# Patient Record
Sex: MALE | Race: WHITE | NOT HISPANIC OR LATINO | Employment: OTHER | URBAN - METROPOLITAN AREA
[De-identification: names, ages, dates, MRNs, and addresses within clinical notes are randomized per-mention and may not be internally consistent; named-entity substitution may affect disease eponyms.]

---

## 2018-03-09 ENCOUNTER — OFFICE VISIT CONVERTED (OUTPATIENT)
Dept: PULMONOLOGY | Facility: CLINIC | Age: 83
End: 2018-03-09
Attending: PHYSICIAN ASSISTANT

## 2018-06-21 ENCOUNTER — OFFICE VISIT CONVERTED (OUTPATIENT)
Dept: PULMONOLOGY | Facility: CLINIC | Age: 83
End: 2018-06-21
Attending: INTERNAL MEDICINE

## 2018-11-08 ENCOUNTER — OFFICE VISIT CONVERTED (OUTPATIENT)
Dept: PULMONOLOGY | Facility: CLINIC | Age: 83
End: 2018-11-08
Attending: INTERNAL MEDICINE

## 2019-03-26 ENCOUNTER — OFFICE VISIT CONVERTED (OUTPATIENT)
Dept: PULMONOLOGY | Facility: CLINIC | Age: 84
End: 2019-03-26
Attending: INTERNAL MEDICINE

## 2019-05-08 ENCOUNTER — OFFICE VISIT CONVERTED (OUTPATIENT)
Dept: UROLOGY | Facility: CLINIC | Age: 84
End: 2019-05-08
Attending: UROLOGY

## 2019-05-08 ENCOUNTER — CONVERSION ENCOUNTER (OUTPATIENT)
Dept: SURGERY | Facility: CLINIC | Age: 84
End: 2019-05-08

## 2019-06-28 ENCOUNTER — HOSPITAL ENCOUNTER (OUTPATIENT)
Dept: CARDIOLOGY | Facility: HOSPITAL | Age: 84
Discharge: HOME | End: 2019-06-28
Attending: INTERNAL MEDICINE

## 2019-07-01 ENCOUNTER — OFFICE VISIT CONVERTED (OUTPATIENT)
Dept: PULMONOLOGY | Facility: CLINIC | Age: 84
End: 2019-07-01
Attending: PHYSICIAN ASSISTANT

## 2019-07-18 ENCOUNTER — OFFICE VISIT CONVERTED (OUTPATIENT)
Dept: PULMONOLOGY | Facility: CLINIC | Age: 84
End: 2019-07-18
Attending: PHYSICIAN ASSISTANT

## 2019-11-08 ENCOUNTER — OFFICE VISIT CONVERTED (OUTPATIENT)
Dept: UROLOGY | Facility: CLINIC | Age: 84
End: 2019-11-08
Attending: UROLOGY

## 2019-12-09 ENCOUNTER — OFFICE VISIT CONVERTED (OUTPATIENT)
Dept: PULMONOLOGY | Facility: CLINIC | Age: 84
End: 2019-12-09
Attending: INTERNAL MEDICINE

## 2020-06-16 ENCOUNTER — HOSPITAL ENCOUNTER (OUTPATIENT)
Dept: GENERAL RADIOLOGY | Facility: HOSPITAL | Age: 85
Discharge: HOME | End: 2020-06-16
Attending: INTERNAL MEDICINE

## 2020-06-16 ENCOUNTER — OFFICE VISIT CONVERTED (OUTPATIENT)
Dept: PULMONOLOGY | Facility: CLINIC | Age: 85
End: 2020-06-16
Attending: INTERNAL MEDICINE

## 2020-06-16 ENCOUNTER — HOSPITAL ENCOUNTER (OUTPATIENT)
Dept: LAB | Facility: HOSPITAL | Age: 85
Discharge: HOME | End: 2020-06-16
Attending: INTERNAL MEDICINE

## 2020-06-16 LAB
ALBUMIN SERPL-MCNC: 4 G/DL (ref 3.5–5)
ALBUMIN/GLOB SERPL: 1.3 {RATIO} (ref 1.4–2.6)
ALP SERPL-CCNC: 90 U/L (ref 56–155)
ALT SERPL-CCNC: 23 U/L (ref 10–40)
ANION GAP SERPL CALC-SCNC: 12 MMOL/L (ref 8–19)
AST SERPL-CCNC: 27 U/L (ref 15–50)
BASOPHILS # BLD AUTO: 0.05 10*3/UL (ref 0–0.2)
BASOPHILS NFR BLD AUTO: 0.6 % (ref 0–3)
BILIRUB SERPL-MCNC: 0.65 MG/DL (ref 0.2–1.3)
BNP SERPL-MCNC: 3560 PG/ML (ref 0–1800)
BUN SERPL-MCNC: 19 MG/DL (ref 5–25)
BUN/CREAT SERPL: 17 {RATIO} (ref 6–20)
CALCIUM SERPL-MCNC: 9.2 MG/DL (ref 8.7–10.4)
CHLORIDE SERPL-SCNC: 103 MMOL/L (ref 99–111)
CONV ABS IMM GRAN: 0.02 10*3/UL (ref 0–0.2)
CONV CO2: 29 MMOL/L (ref 22–32)
CONV IMMATURE GRAN: 0.2 % (ref 0–1.8)
CONV TOTAL PROTEIN: 7 G/DL (ref 6.3–8.2)
CREAT UR-MCNC: 1.12 MG/DL (ref 0.7–1.2)
DEPRECATED RDW RBC AUTO: 45.4 FL (ref 35.1–43.9)
EOSINOPHIL # BLD AUTO: 0.33 10*3/UL (ref 0–0.7)
EOSINOPHIL # BLD AUTO: 3.9 % (ref 0–7)
ERYTHROCYTE [DISTWIDTH] IN BLOOD BY AUTOMATED COUNT: 14.7 % (ref 11.6–14.4)
GFR SERPLBLD BASED ON 1.73 SQ M-ARVRAT: 58 ML/MIN/{1.73_M2}
GLOBULIN UR ELPH-MCNC: 3 G/DL (ref 2–3.5)
GLUCOSE SERPL-MCNC: 96 MG/DL (ref 70–99)
HCT VFR BLD AUTO: 44.5 % (ref 42–52)
HGB BLD-MCNC: 14.1 G/DL (ref 14–18)
LYMPHOCYTES # BLD AUTO: 2.35 10*3/UL (ref 1–5)
LYMPHOCYTES NFR BLD AUTO: 28.1 % (ref 20–45)
MCH RBC QN AUTO: 27 PG (ref 27–31)
MCHC RBC AUTO-ENTMCNC: 31.7 G/DL (ref 33–37)
MCV RBC AUTO: 85.2 FL (ref 80–96)
MONOCYTES # BLD AUTO: 0.8 10*3/UL (ref 0.2–1.2)
MONOCYTES NFR BLD AUTO: 9.6 % (ref 3–10)
NEUTROPHILS # BLD AUTO: 4.81 10*3/UL (ref 2–8)
NEUTROPHILS NFR BLD AUTO: 57.6 % (ref 30–85)
NRBC CBCN: 0 % (ref 0–0.7)
OSMOLALITY SERPL CALC.SUM OF ELEC: 294 MOSM/KG (ref 273–304)
PLATELET # BLD AUTO: 172 10*3/UL (ref 130–400)
PMV BLD AUTO: 12.5 FL (ref 9.4–12.4)
POTASSIUM SERPL-SCNC: 3.4 MMOL/L (ref 3.5–5.3)
RBC # BLD AUTO: 5.22 10*6/UL (ref 4.7–6.1)
SODIUM SERPL-SCNC: 141 MMOL/L (ref 135–147)
WBC # BLD AUTO: 8.36 10*3/UL (ref 4.8–10.8)

## 2020-06-24 ENCOUNTER — OFFICE VISIT CONVERTED (OUTPATIENT)
Dept: PULMONOLOGY | Facility: CLINIC | Age: 85
End: 2020-06-24
Attending: INTERNAL MEDICINE

## 2020-08-19 ENCOUNTER — OFFICE VISIT CONVERTED (OUTPATIENT)
Dept: PULMONOLOGY | Facility: CLINIC | Age: 85
End: 2020-08-19
Attending: INTERNAL MEDICINE

## 2020-08-19 ENCOUNTER — HOSPITAL ENCOUNTER (OUTPATIENT)
Dept: PREADMISSION TESTING | Facility: HOSPITAL | Age: 85
Discharge: HOME | End: 2020-08-19
Attending: INTERNAL MEDICINE

## 2020-08-20 LAB — SARS-COV-2 RNA SPEC QL NAA+PROBE: NOT DETECTED

## 2020-08-24 ENCOUNTER — HOSPITAL ENCOUNTER (OUTPATIENT)
Dept: GASTROENTEROLOGY | Facility: HOSPITAL | Age: 85
Setting detail: HOSPITAL OUTPATIENT SURGERY
Discharge: HOME | End: 2020-08-24
Attending: INTERNAL MEDICINE

## 2020-08-24 LAB
EPI CELLS NFR FLD: 14 %
LYMPHOCYTES NFR FLD MANUAL: 12 %
MACROPHAGE FLUID: 14 /100{WBCS}
NEUTROPHILS NFR FLD MANUAL: 60 %
VISUAL PRESENCE OF BLOOD: NORMAL

## 2020-08-27 LAB
AMPICILLIN SUSC ISLT: >=32
AMPICILLIN+SULBAC SUSC ISLT: 4
BACTERIA SPEC AEROBE CULT: ABNORMAL
CEFAZOLIN SUSC ISLT: <=4
CEFEPIME SUSC ISLT: 2
CEFEPIME SUSC ISLT: <=0.12
CEFTAZIDIME SUSC ISLT: 4
CEFTAZIDIME SUSC ISLT: <=1
CEFTRIAXONE SUSC ISLT: <=0.25
CIPROFLOXACIN SUSC ISLT: <=0.25
CIPROFLOXACIN SUSC ISLT: <=0.25
CIPROFLOXACIN SUSC ISLT: >=8
CLINDAMYCIN SUSC ISLT: 0.25
CONV BRONCHIAL WASH CULTURE: ABNORMAL
CONV NOCARDIA STAIN (PARTIAL,MODIFIED ACID FAST): NORMAL
DOXYCYCLINE SUSC ISLT: <=0.5
ERTAPENEM SUSC ISLT: <=0.12
ERYTHROMYCIN SUSC ISLT: >=8
GENTAMICIN SUSC ISLT: <=0.5
GENTAMICIN SUSC ISLT: <=1
GENTAMICIN SUSC ISLT: <=1
LEVOFLOXACIN SUSC ISLT: 1
LEVOFLOXACIN SUSC ISLT: <=0.12
LEVOFLOXACIN SUSC ISLT: >=8
OXACILLIN SUSC ISLT: >=4
PIP+TAZO SUSC ISLT: 8
PIP+TAZO SUSC ISLT: <=4
RIFAMPIN SUSC ISLT: <=0.5
TETRACYCLINE SUSC ISLT: <=1
TIGECYCLINE SUSC ISLT: <=0.12
TMP SMX SUSC ISLT: <=10
TMP SMX SUSC ISLT: <=20
TOBRAMYCIN SUSC ISLT: <=1
TOBRAMYCIN SUSC ISLT: <=1
VANCOMYCIN SUSC ISLT: 1

## 2020-08-28 LAB
CMV DNA SPEC QL NAA+PROBE: DETECTED
CONV ADENOVIRUS  (BAL OR WASH): NEGATIVE
CONV CYTOMEGALOVIRUS VIRUS SOURCE: ABNORMAL
CONV HERPES SIMPLEX VIRUS QUAL. PCR: NOT DETECTED
FLUAV RNA SPEC QL NAA+PROBE: NEGATIVE
FLUBV RNA ISLT QL NAA+PROBE: NEGATIVE
HERPES SIMPLEX VIRUS SOURCE: NORMAL
HMPV RNA SPEC QL NAA+PROBE: NEGATIVE
HPIV1 RNA ISLT QL NAA+PROBE: NEGATIVE
HPIV2 SPEC QL CULT: NEGATIVE
HPIV3 SPEC QL CULT: NEGATIVE
RHINOVIRUS RNA SPEC QL NAA+PROBE: NEGATIVE
RSV A: NEGATIVE
RSV B RNA SPEC QL NAA+PROBE: NEGATIVE

## 2020-09-01 ENCOUNTER — OFFICE VISIT CONVERTED (OUTPATIENT)
Dept: PULMONOLOGY | Facility: CLINIC | Age: 85
End: 2020-09-01
Attending: PHYSICIAN ASSISTANT

## 2020-09-03 LAB — CONV LEGIONELLA CULTURE: NORMAL

## 2020-09-15 ENCOUNTER — CONVERSION ENCOUNTER (OUTPATIENT)
Dept: PULMONOLOGY | Facility: CLINIC | Age: 85
End: 2020-09-15

## 2020-11-02 ENCOUNTER — HOSPITAL ENCOUNTER (OUTPATIENT)
Dept: GENERAL RADIOLOGY | Facility: HOSPITAL | Age: 85
Discharge: HOME | End: 2020-11-02
Attending: PHYSICIAN ASSISTANT

## 2020-11-05 ENCOUNTER — HOSPITAL ENCOUNTER (OUTPATIENT)
Dept: PET IMAGING | Facility: HOSPITAL | Age: 85
Discharge: HOME | End: 2020-11-05
Attending: NURSE PRACTITIONER

## 2020-11-09 ENCOUNTER — OFFICE VISIT CONVERTED (OUTPATIENT)
Dept: PULMONOLOGY | Facility: CLINIC | Age: 85
End: 2020-11-09
Attending: NURSE PRACTITIONER

## 2020-11-09 ENCOUNTER — HOSPITAL ENCOUNTER (OUTPATIENT)
Dept: LAB | Facility: HOSPITAL | Age: 85
Discharge: HOME | End: 2020-11-09
Attending: NURSE PRACTITIONER

## 2020-11-09 LAB
ALBUMIN SERPL-MCNC: 3.8 G/DL (ref 3.5–5)
ALBUMIN/GLOB SERPL: 1.5 {RATIO} (ref 1.4–2.6)
ALP SERPL-CCNC: 71 U/L (ref 56–155)
ALT SERPL-CCNC: 17 U/L (ref 10–40)
ANION GAP SERPL CALC-SCNC: 12 MMOL/L (ref 8–19)
AST SERPL-CCNC: 30 U/L (ref 15–50)
BASOPHILS # BLD AUTO: 0.06 10*3/UL (ref 0–0.2)
BASOPHILS NFR BLD AUTO: 0.6 % (ref 0–3)
BILIRUB SERPL-MCNC: 0.46 MG/DL (ref 0.2–1.3)
BUN SERPL-MCNC: 24 MG/DL (ref 5–25)
BUN/CREAT SERPL: 20 {RATIO} (ref 6–20)
CALCIUM SERPL-MCNC: 9.3 MG/DL (ref 8.7–10.4)
CHLORIDE SERPL-SCNC: 101 MMOL/L (ref 99–111)
CONV ABS IMM GRAN: 0.03 10*3/UL (ref 0–0.2)
CONV CO2: 29 MMOL/L (ref 22–32)
CONV IMMATURE GRAN: 0.3 % (ref 0–1.8)
CONV TOTAL PROTEIN: 6.4 G/DL (ref 6.3–8.2)
CREAT UR-MCNC: 1.21 MG/DL (ref 0.7–1.2)
DEPRECATED RDW RBC AUTO: 50.1 FL (ref 35.1–43.9)
EOSINOPHIL # BLD AUTO: 0.21 10*3/UL (ref 0–0.7)
EOSINOPHIL # BLD AUTO: 2.2 % (ref 0–7)
ERYTHROCYTE [DISTWIDTH] IN BLOOD BY AUTOMATED COUNT: 15.5 % (ref 11.6–14.4)
GFR SERPLBLD BASED ON 1.73 SQ M-ARVRAT: 53 ML/MIN/{1.73_M2}
GLOBULIN UR ELPH-MCNC: 2.6 G/DL (ref 2–3.5)
GLUCOSE SERPL-MCNC: 82 MG/DL (ref 70–99)
HCT VFR BLD AUTO: 44 % (ref 42–52)
HGB BLD-MCNC: 14.2 G/DL (ref 14–18)
LYMPHOCYTES # BLD AUTO: 2.48 10*3/UL (ref 1–5)
LYMPHOCYTES NFR BLD AUTO: 26.4 % (ref 20–45)
MCH RBC QN AUTO: 28.4 PG (ref 27–31)
MCHC RBC AUTO-ENTMCNC: 32.3 G/DL (ref 33–37)
MCV RBC AUTO: 88 FL (ref 80–96)
MONOCYTES # BLD AUTO: 0.84 10*3/UL (ref 0.2–1.2)
MONOCYTES NFR BLD AUTO: 8.9 % (ref 3–10)
NEUTROPHILS # BLD AUTO: 5.78 10*3/UL (ref 2–8)
NEUTROPHILS NFR BLD AUTO: 61.6 % (ref 30–85)
NRBC CBCN: 0 % (ref 0–0.7)
OSMOLALITY SERPL CALC.SUM OF ELEC: 289 MOSM/KG (ref 273–304)
PLATELET # BLD AUTO: 182 10*3/UL (ref 130–400)
PMV BLD AUTO: 12.2 FL (ref 9.4–12.4)
POTASSIUM SERPL-SCNC: 4.3 MMOL/L (ref 3.5–5.3)
RBC # BLD AUTO: 5 10*6/UL (ref 4.7–6.1)
SODIUM SERPL-SCNC: 138 MMOL/L (ref 135–147)
WBC # BLD AUTO: 9.4 10*3/UL (ref 4.8–10.8)

## 2020-11-11 ENCOUNTER — OFFICE VISIT CONVERTED (OUTPATIENT)
Dept: GASTROENTEROLOGY | Facility: CLINIC | Age: 85
End: 2020-11-11
Attending: NURSE PRACTITIONER

## 2020-11-19 ENCOUNTER — HOSPITAL ENCOUNTER (OUTPATIENT)
Dept: PREADMISSION TESTING | Facility: HOSPITAL | Age: 85
Discharge: HOME | End: 2020-11-19
Attending: INTERNAL MEDICINE

## 2020-11-21 LAB — SARS-COV-2 RNA SPEC QL NAA+PROBE: NOT DETECTED

## 2020-11-24 ENCOUNTER — HOSPITAL ENCOUNTER (OUTPATIENT)
Dept: GASTROENTEROLOGY | Facility: HOSPITAL | Age: 85
Setting detail: HOSPITAL OUTPATIENT SURGERY
Discharge: HOME | End: 2020-11-24
Attending: INTERNAL MEDICINE

## 2020-12-15 ENCOUNTER — OFFICE VISIT CONVERTED (OUTPATIENT)
Dept: PULMONOLOGY | Facility: CLINIC | Age: 85
End: 2020-12-15
Attending: INTERNAL MEDICINE

## 2021-01-04 ENCOUNTER — OFFICE VISIT CONVERTED (OUTPATIENT)
Dept: PULMONOLOGY | Facility: CLINIC | Age: 86
End: 2021-01-04
Attending: INTERNAL MEDICINE

## 2021-02-19 ENCOUNTER — OFFICE VISIT CONVERTED (OUTPATIENT)
Dept: PULMONOLOGY | Facility: CLINIC | Age: 86
End: 2021-02-19
Attending: PHYSICIAN ASSISTANT

## 2021-04-21 ENCOUNTER — OFFICE VISIT CONVERTED (OUTPATIENT)
Dept: PULMONOLOGY | Facility: CLINIC | Age: 86
End: 2021-04-21
Attending: INTERNAL MEDICINE

## 2021-05-10 NOTE — H&P
History and Physical      Patient Name: Wesley Cunningham   Patient ID: [id number]   Sex: Male   YOB: 1932    Primary Care Provider: Alfredo Worley MD   Referring Provider: Alfredo Worley MD    Visit Date: November 11, 2020    Provider: RICHIE Glaser   Location: [facility]  [place]   Location Address: [address] [place] [address]  [place]  [id number]   Location Phone: [phone]          Chief Complaint  · Abnormal PET scan      History Of Present Illness  The patient is a 88 year old /White male , who presents on referral from Alfredo Worley MD and Carmen QUIROZ , for an abnormal PET scan.          88-year-old male, accompanied by his daughter, is being referred by Carmen Johnson for the evaluation of abnormal PET scan.  He sees pulmonology for COPD and was recently told he had fungus in his lungs.  He had a spot on his lung on a chest CT, so he was sent for a PET scan.  The PET scan 11/05/2020 revealed focal radiopharmacetuical humiliation corresponding to the cecum and ascending colon.  There appears to be an ill-defined abnormal possible soft tissue attenuation involving colon in this region suggesting a colonic mass.  The patient denies rectal bleeding.  The patient has altered bowel habits.  He reports having some incidences of diarrhea.  He also reports having constipation and having a bowel movement every 2 to 3 days with the help of a stool softener.  He takes Eliquis through Dr. Wells.  There is no known family history of colon cancer.    Labs 11/09/2020: Hemoglobin 14.2, hematocrit 44, platelets 182, AST 30, ALT 17, alk phos 71, total bili 0.46, creatinine 1.21, GFR 53    The patient's O2 sats are 78% in office today.  The patient appears in no apparent distress.  He reports that they have a hard time getting an accurate O2 sat on him because his hands are so cold.  His daughter reports he does have oxygen, but he does not use it.        Past Medical History  Anxiety; Arthritis; COPD; Depression; Dizzy; Erectile Dysfunction, Organic; Prostatitis (Chronic)         Past Surgical History  Bronchoscopy; Cholecystectomy; EAR Surgery; EGD; Hernia         Medication List  Advair Diskus 250-50 mcg/dose inhalation blister with device; amlodipine 5 mg oral tablet; Antiva OTC; aspirin 81 mg oral tablet,chewable; Co Q-10 200 mg oral capsule; duloxetine 60 mg oral capsule,delayed release(DR/EC); Eliquis 5 mg oral tablet; Eye Vitamin and Minerals 7,160-113-100 unit-mg-unit oral tablet; finasteride 5 mg oral tablet; fluticasone 50 mcg/actuation nasal spray,suspension; furosemide 20 mg oral tablet; gabapentin 300 mg oral capsule; lisinopril 2.5 mg oral tablet; meclizine 25 mg oral tablet; meloxicam 15 mg oral tablet; metoprolol succinate 25 mg oral tablet extended release 24 hr; Multiple Vitamins oral tablet; omeprazole 20 mg oral capsule,delayed release(DR/EC); tamsulosin 0.4 mg oral capsule; venlafaxine 150 mg oral capsule,extended release 24hr; Vitamin D3 oral         Allergy List  amoxicillin; I.V. Dye; Levaquin       Allergies Reconciled  Family Medical History  *Non Contributory         Social History  Alcohol (Never); Tobacco (Never);          Review of Systems  · Constitutional  o Denies  o : chills, fever  · Eyes  o Denies  o : blurred vision, changes in vision  · Cardiovascular  o Denies  o : chest pain  · Respiratory  o Denies  o : shortness of breath  · Gastrointestinal  o Denies  o : nausea, vomiting  · Genitourinary  o Denies  o : dysuria, blood in urine  · Integument  o Denies  o : rash  · Neurologic  o Denies  o : tingling or numbness  · Musculoskeletal  o Denies  o : joint pain  · Endocrine  o Denies  o : weight gain, weight loss  · Psychiatric  o Denies  o : anxiety, depression      Vitals  Date Time BP Position Site L\R Cuff Size HR RR TEMP (F) WT  HT  BMI kg/m2 BSA m2 O2 Sat FR L/min FiO2        11/11/2020 01:37 /78 Sitting   "  85 - R   152lbs 1oz 5'  8\" 23.12 1.82 78 %            Physical Examination  · Constitutional  o Appearance  o : Well-nourished, well developed, alert, in no acute distress, alert and oriented X 3.  · Eyes  o Vision  o :   § Visual Fields  § : move symmetrical in all directions  o Sclerae  o : sclerae anicteric  o Pupils and Irises  o : pupils equal and symetrical  · Neck  o Inspection/Palpation  o : Trachea is midline, no adenopathy  o Thyroid  o : Thyroid is not enlarged  · Respiratory  o Respiratory Effort  o : Breathing is unlabored.  o Inspection of Chest  o : normal appearance, no retractions  o Auscultation of Lungs  o : Chest is clear to auscultation bilaterally  · Cardiovascular  o Heart  o :   § Auscultation of Heart  § : no murmurs, rubs, or gallops  · Gastrointestinal  o Abdominal Examination  o : Abdomen is soft, nontender to palpation, with normal active bowel sounds, no appreciable hepatosplenomegaly.  · Skin and Subcutaneous Tissue  o General Inspection  o : Skin is without focal lesions. Skin turgor is normal.  · Psychiatric  o Mood and Affect  o : Mood and affect are appropriate to circumstances.              Assessment  · Altered Bowel Habits     787.99/R19.4  · Abnormal PET scan of colon     794.9/R94.8      Plan  · Orders  o Flexible Colonoscopy -Possible risks/complications, benefits, and alternatives to surgical or invasive procedure have been explained to patient and/or legal gaurdian. -Patient has been evaluated and can tolerate anethesia and/or sedation. Risk, benefits, and alternatives to anethesia and/or sedation have been explained to patient and/or legal gaurdian. (70032) - 794.9/R94.8, 787.99/R19.4 - 11/11/2020  · Medications  o Medications have been Reconciled  o Transition of Care or Provider Policy  · Instructions  o 88-year-old male, accompanied by his daughter, is being referred today for the evaluation of an abnormal PET scan. I have discussed thoroughly with the patient and his " daughter about undergoing a colonoscopy and possible treatment if there is a malignancy found. The patient and his daughter want to proceed with a colonoscopy, as the patient feels there is just something not right with his body. We will get cardiac clearance from Dr. Wells to withhold his Eliquis for 2 days. We will also get pulmonary clearance from Carmen Johnson due to his O2 sats. We will get him set up as urgently as possible for colonoscopy  o Electronically Identified Patient Education Materials Provided Electronically            Electronically Signed by: RICHIE Glaser -Author on November 11, 2020 02:02:07 PM  Electronically Co-signed by: Chele Hernandez MD -Reviewer on November 19, 2020 05:51:03 PM

## 2021-05-14 VITALS
WEIGHT: 152.06 LBS | DIASTOLIC BLOOD PRESSURE: 78 MMHG | HEART RATE: 85 BPM | HEIGHT: 68 IN | SYSTOLIC BLOOD PRESSURE: 109 MMHG | OXYGEN SATURATION: 78 % | BODY MASS INDEX: 23.04 KG/M2

## 2021-05-15 VITALS — BODY MASS INDEX: 23.99 KG/M2 | HEIGHT: 69 IN | RESPIRATION RATE: 14 BRPM | WEIGHT: 162 LBS

## 2021-05-15 VITALS — RESPIRATION RATE: 16 BRPM | HEIGHT: 69 IN | BODY MASS INDEX: 23.85 KG/M2 | WEIGHT: 161 LBS

## 2021-05-28 VITALS
HEIGHT: 68 IN | DIASTOLIC BLOOD PRESSURE: 67 MMHG | SYSTOLIC BLOOD PRESSURE: 100 MMHG | TEMPERATURE: 97.7 F | BODY MASS INDEX: 22.88 KG/M2 | RESPIRATION RATE: 15 BRPM | HEART RATE: 72 BPM | WEIGHT: 151 LBS | OXYGEN SATURATION: 97 %

## 2021-05-28 VITALS
OXYGEN SATURATION: 96 % | HEIGHT: 69 IN | HEART RATE: 62 BPM | TEMPERATURE: 98.1 F | RESPIRATION RATE: 16 BRPM | RESPIRATION RATE: 14 BRPM | RESPIRATION RATE: 12 BRPM | BODY MASS INDEX: 22.96 KG/M2 | OXYGEN SATURATION: 93 % | TEMPERATURE: 98.4 F | OXYGEN SATURATION: 98 % | HEIGHT: 69 IN | SYSTOLIC BLOOD PRESSURE: 120 MMHG | HEART RATE: 73 BPM | HEART RATE: 65 BPM | TEMPERATURE: 98.2 F | WEIGHT: 163 LBS | DIASTOLIC BLOOD PRESSURE: 83 MMHG | WEIGHT: 163 LBS | SYSTOLIC BLOOD PRESSURE: 140 MMHG | BODY MASS INDEX: 24.14 KG/M2 | WEIGHT: 155.06 LBS | DIASTOLIC BLOOD PRESSURE: 84 MMHG | DIASTOLIC BLOOD PRESSURE: 76 MMHG | BODY MASS INDEX: 24.14 KG/M2 | HEIGHT: 69 IN | SYSTOLIC BLOOD PRESSURE: 157 MMHG

## 2021-05-28 VITALS
BODY MASS INDEX: 23.25 KG/M2 | OXYGEN SATURATION: 100 % | HEIGHT: 69 IN | TEMPERATURE: 98.1 F | DIASTOLIC BLOOD PRESSURE: 90 MMHG | SYSTOLIC BLOOD PRESSURE: 137 MMHG | WEIGHT: 153.12 LBS | HEART RATE: 75 BPM | BODY MASS INDEX: 22.68 KG/M2 | SYSTOLIC BLOOD PRESSURE: 138 MMHG | HEART RATE: 60 BPM | RESPIRATION RATE: 14 BRPM | DIASTOLIC BLOOD PRESSURE: 90 MMHG | RESPIRATION RATE: 16 BRPM | WEIGHT: 164 LBS | OXYGEN SATURATION: 98 % | RESPIRATION RATE: 18 BRPM | SYSTOLIC BLOOD PRESSURE: 126 MMHG | OXYGEN SATURATION: 88 % | BODY MASS INDEX: 24.29 KG/M2 | HEIGHT: 69 IN | DIASTOLIC BLOOD PRESSURE: 80 MMHG | TEMPERATURE: 98.2 F | WEIGHT: 157 LBS | HEART RATE: 60 BPM | TEMPERATURE: 98.5 F | HEIGHT: 69 IN

## 2021-05-28 VITALS
TEMPERATURE: 97.7 F | SYSTOLIC BLOOD PRESSURE: 144 MMHG | DIASTOLIC BLOOD PRESSURE: 90 MMHG | TEMPERATURE: 97.9 F | WEIGHT: 164 LBS | DIASTOLIC BLOOD PRESSURE: 78 MMHG | SYSTOLIC BLOOD PRESSURE: 126 MMHG | RESPIRATION RATE: 16 BRPM | SYSTOLIC BLOOD PRESSURE: 150 MMHG | RESPIRATION RATE: 16 BRPM | BODY MASS INDEX: 23.25 KG/M2 | HEART RATE: 78 BPM | BODY MASS INDEX: 24.29 KG/M2 | OXYGEN SATURATION: 97 % | BODY MASS INDEX: 23.92 KG/M2 | TEMPERATURE: 98.4 F | DIASTOLIC BLOOD PRESSURE: 74 MMHG | OXYGEN SATURATION: 96 % | HEIGHT: 69 IN | HEART RATE: 70 BPM | HEIGHT: 69 IN | WEIGHT: 161.5 LBS | OXYGEN SATURATION: 98 % | RESPIRATION RATE: 14 BRPM | HEART RATE: 69 BPM | HEIGHT: 69 IN | WEIGHT: 157 LBS

## 2021-05-28 VITALS
OXYGEN SATURATION: 97 % | HEART RATE: 72 BPM | DIASTOLIC BLOOD PRESSURE: 62 MMHG | RESPIRATION RATE: 12 BRPM | DIASTOLIC BLOOD PRESSURE: 68 MMHG | HEIGHT: 69 IN | HEART RATE: 78 BPM | TEMPERATURE: 97.7 F | BODY MASS INDEX: 21.48 KG/M2 | SYSTOLIC BLOOD PRESSURE: 99 MMHG | TEMPERATURE: 96.6 F | OXYGEN SATURATION: 98 % | WEIGHT: 145 LBS | HEIGHT: 69 IN | SYSTOLIC BLOOD PRESSURE: 93 MMHG | BODY MASS INDEX: 22.74 KG/M2 | RESPIRATION RATE: 16 BRPM | WEIGHT: 153.5 LBS

## 2021-05-28 VITALS
WEIGHT: 140 LBS | RESPIRATION RATE: 14 BRPM | OXYGEN SATURATION: 98 % | SYSTOLIC BLOOD PRESSURE: 125 MMHG | HEIGHT: 69 IN | HEART RATE: 72 BPM | TEMPERATURE: 98.5 F | DIASTOLIC BLOOD PRESSURE: 82 MMHG | BODY MASS INDEX: 20.73 KG/M2

## 2021-05-28 NOTE — PROGRESS NOTES
Patient: MARCIO HERNANDEZ     [id number]     Report: #[id number]  UNIT #: [id number]     : 1932    Encounter Date:2019  PRIMARY CARE: CHEYENNE SMITH  ***Signed***  --------------------------------------------------------------------------------------------------------------------  Chief Complaint      Encounter Date      2019            Primary Care Provider      MICHAEL URIOSTEGUI            Referring Provider      MICHAEL URIOSTEGUI            Patient Complaint      Patient is complaining of      Patient here today to discuss 6 min walk results, possible A-Fib            VITALS      Height 69 in / 175.26 cm      Weight 163 lbs  / 73.528335 kg      BSA 1.89 m2      BMI 24.1 kg/m2      Temperature 98.1 F / 36.72 C - Oral      Pulse 62      Respirations 12      Blood Pressure 157/83 Sitting, Left Arm      Pulse Oximetry 96%, room air      Initial Exhaled Nitrous Oxide      Exhaled Nitrous Oxide Results:  9            HPI      The patient is a pleasant 87 year old white male patient of Dr. Delgado's known to    me from a previous office visit as well.  He has moderate COPD, gastroesophageal    reflux disease, postnasal drip. He had been having some gradually worsening     dyspnea on exertion, so Dr. Delgado ordered repeat PFTs and six minute walk for     him.  He had those done several days ago on 2019 and was noted to be in     atrial fibrillation during six minute walk test when he was hooked up to a     monitoring system.  He tells me he has never before been diagnosed with atrial     fibrillation.  He denies any palpitations. He is having some dyspnea on exertion    with moderate exertion relieved with rest and tells me he has overall felt more     fatigued over the past several months.  He denies chest pain, lower extremity     edema or orthopnea.  He denies cough, wheezing, hemoptysis, fever or chills. He     is still using Advair and Tudorza and feel that they help him.  He tells me  his     gastroesophageal reflux disease is under good control as long as he takes his     PPI.            I have reviewed her ROS, medical, surgical and family history and agree with     those as entered in the chart.      Copies To:   Blu Delgado ;            DOMINGO      Constitutional:  Denies: Fatigue, Fever, Weight gain, Weight loss, Chills,     Insomnia, Other      Respiratory/Breathing:  Denies: Shortness of air, Wheezing, Cough, Hemoptysis,     Pleuritic pain, Other      Endocrine:  Denies: Polydipsia, Polyuria, Heat/cold intolerance, Diabetes, Other      Eyes:  Denies: Blurred vision, Vision Changes, Other      Ears, nose, mouth, throat:  Denies: Congestion, Dysphagia, Hearing Changes, Nose    Bleeding, Nasal Discharge, Throat pain, Tinnitus, Other      Cardiovascular:  Denies: Chest Pain, Exertional dyspnea, Peripheral Edema,     Palpitations, Syncope, Wake up Gasping for air, Orthopnea, Tachycardia, Other      Gastrointestinal:  Denies: Abdominal pain/cramping, Bloody stools, Constipation,    Diarrhea, Melena, Nausea, Vomiting, Other      Genitourinary:  Denies: Dysuria, Urinary frequency, Incontinence, Hematuria,     Urgency, Other      Musculoskeletal:  Denies: Joint Pain, Joint Stiffness, Joint Swelling, Myalgias,    Other      Hematologic/lymphatic:  DENIES: Lymphadenopathy, Bruising, Bleeding tendencies,     Other      Neurologic:  Denies: Headache, Numbness, Weakness, Seizures, Other      Psychiatric:  Denies: Anxiety, Appropriate Effect, Depression, Other      Sleep:  No: Excessive daytime sleep, Morning Headache?, Snoring, Insomnia?, Stop    breathing at sleep?, Other      Integumentary:  Denies: Rash, Dry skin, Skin Warm to Touch, Other            FAMILY/SOCIAL/MEDICAL HX      Current History      Lives alone. Never smoked. No alcohol. No illicit drugs use.       Used to work as a farmer.      No pets or birds at home.      Surgical History:  Yes: Cholecystectomy, Eye Surgery, Head Surgery (BILATERAL      CATARACTS REMOVED), Hernia Surgery, Orthopedic Surgery (RIGHT ELBOW BURSECTOMY);    No: AAA Repair, Abdominal Surgery, Angioplasty, Appendectomy, Back Surgery,     Bladder Surgery, Bowel Surgery, Breast Surgery, CABG, Carotid Stenosis, Ear     Surgery, Kidney Surgery, Nose Surgery, Oral Surgery, Prostatectomy, Rectal     Surgery, Spinal Surgery, Testicular Surgery, Throat Surgery, Valve Replacement,     Vascular Surgery, Other Surgeries      Diabetes - Family Hx:  Child      Is Father Still Living?:  No      Is Mother Still Living?:  No       Family History:  Yes      Social History:  No Tobacco Use, No Alcohol Use, No Recreational Drug use      Smoking status:  Never smoker      Occupation:  farmer      Anticoagulation Therapy:  No      Antibiotic Prophylaxis:  No      Medical History:  Yes: Arthritis (GENERALIZED), Chemotherapy/Cancer (SKIN CANCER    REMOVED EAR AND BACK AREA), Chronic Bronchitis/COPD, Hemorrhoids/Rectal Prob     (ACID REFLUX), Hiatal Hernia, High Blood Pressure, Shortness Of Breath (HX OF     EMPHYSEMA, BRONCHITIS), Miscellaneous Medical/oth (RESTLESS LEG SYNDROME); No:     Alcoholism, Allergies, Anemia, Asthma, Blood Disease, Broken Bones, Cataracts,     Chemical Dependency, Emphysema, Chronic Liver Disease, Colon Trouble, Colitis,     Diverticulitis, Congestive Heart Failu, Deafness or Ringing Ears, Convulsions,     Depression, Anxiety, Bipolar Disorder, Diabetes, Epilepsy, Seizures,     Forgetfullness, Glaucoma, Gall Stones, Gout, Head Injury, Heart Attack, Heart     Murmur, Hepatitis, High Cholesterol, HIV (Do not ask - volu, Jaundice, Kidney or    Bladder Disease, Kidney Stones, Migrane Headaches, Mitral Valve Prolapse, Night     sweats, Phlebitis, Psychiatric Care, Reflux Disease, Rheumatic Fever, Sexually     Transmitted Dis, Sinus Trouble, Skin Disease/Psoriais/Ecz, Stroke, Thyroid     Problem, Tuberculosis or Pos TB Te      Psychiatric History      none            PREVENTION      Hx  Influenza Vaccination:  Yes      Date Influenza Vaccine Given:  Oct 1, 2018      Influenza Vaccine Declined:  No      2 or More Falls Past Year?:  No      Fall Past Year with Injury?:  No      Hx Pneumococcal Vaccination:  Yes      Encouraged to follow-up with:  PCP regarding preventative exams.      Chart initiated by      Dionna Mahan CMA            ALLERGIES/MEDICATIONS      Allergies:        Coded Allergies:             LEVOFLOXACIN (Verified  Allergy, Unknown, NAUSEA/VOMITING, 7/1/19)                  PT. STATED HE CAN TAKE IT IV THOUGH, NO REACTION      Uncoded Allergies:             IV DYE (Allergy, Unknown, EYES SWOLLEN FOREHEAD RASH, 3/2/18)      Medications    Last Reconciled on 7/1/19 08:35 by KATHERINE CHEN      MDI-Advair 250/50 (Advair 250/50 Diskus) 1 Each Blst.w.dev      1 PUFF INH BID for 90 Days, #3 INH 3 Refills         Prov: Blu Delgado         6/10/19       Nathan-Fluticasone (Fluticasone 50 mcg) 16 Gm Spray.susp      2 PUFFS NARE EACH QDAY, #1 BOTTLE 6 Refills         Prov: Blu Delgado         3/26/19       Aclidinium Bromide (Tudorza) 400 Mcg Inh               Prov: Blu Delgado         3/26/19       MDI-Albuterol (Ventolin HFA) 8 Gm Hfa.aer.ad      2 PUFFS INH Q4-6H PRN for DYSPNEA, #3 INH 3 Refills         Prov: Blu Delgado         11/8/18       Aclidinium Bromide (Tudorza) 400 Mcg Inh      1 PUFF INH RTBID, #1 MDI 0 Refills         Reported         6/21/18       (B12)   No Conflict Check      2500 UNITS PO QDAY         Reported         3/2/18       (Eye Vitamin)   No Conflict Check      QDAY         Reported         3/2/18       Lansoprazole (Prevacid) 15 Mg Capsule.      15 MG PO QDAY, CAP         Reported         12/7/17       Meclizine Hcl (Meclizine*) 25 Mg Tablet      25 MG PO BID, #60 TAB 0 Refills         Reported         7/1/15       DULoxetine (Cymbalta) 60 Mg Capsule      60 MG PO QDAY, #30 CAP 0 Refills         Reported         11/3/14       Multivitamins (Multi-Day Vitamin)  1 Tab Tablet      1 TAB PO QDAY, TAB         Reported         11/7/13       Tamsulosin HCL (Flomax) 0.4 Mg Cap.sr.24h      0.4 MG PO HS, CAP         Reported         11/7/13       Cholecalciferol (Vitamin D3*) 1,000 Unit Tab      2000 UNITS PO QDAY, TAB         Reported         11/4/13       Gabapentin (Gabapentin) 300 Mg Capsule      300 MG PO BID         Reported         1/16/12      Current Medications      Current Medications Reviewed 7/1/19            EXAM      CONSTITUTIONAL: Pleasant  normal conversant.       EYES : Pink conjunctive, no ptosis, PERRL.       ENMT : Nose and ears appear normal, normal dentition, mild posterior pharyngeal     wall erythema, no sinus tenderness. Mallampati classification       Neck: Nontender, no masses, no thyromegaly, no nodules.      Resp : Lungs are grossly clear to auscultation.  No wheezes, rhonchi or crackles    appreciated.  Normal work of breathing.        CVS  : No carotid bruits, s1s2 nl, RRR, no murmur, rubs or gallop, no peripheral    edema       Chest wall: Normal rise with inspiration, nontender on palpation.      GI   : Abdomen soft, with no masses, no hepatosplenomegaly, no hernias, BS+      MSK  : Normal gait and station, no digital cyanosis or clubbing       Skin : No rashes, ulcerations or lesions, normal turgor and temperature      Neuro: CN II - XII intact, no sensory deficits, DTRs intact and symmetrical, no     motor weakness      Psych: Appropriate affect, A   Vitals      Vitals:             Height 69 in / 175.26 cm           Weight 163 lbs  / 73.194556 kg           BSA 1.89 m2           BMI 24.1 kg/m2           Temperature 98.1 F / 36.72 C - Oral           Pulse 62           Respirations 12           Blood Pressure 157/83 Sitting, Left Arm           Pulse Oximetry 96%, room air            REVIEW      Results Reviewed      PCCS Results Reviewed?:  Yes Prev Lab Results, Yes Prev Radiology Results, Yes     Previous Mecial Records      Lab Results      I  personally reviewed previous lab work, imaging and provider notes including     recent six minute walk test and rhythm strip from before six minute walk test.            Assessment      Notes      New Referrals      * Cardiology, SCHEDULED PROCEDURE         PITA LEWIS         Status changed from Active to Complete.         Dx: Atrial fibrillation - I48.91      ASSESSMENT:       1. Moderate COPD without acute exacerbation.      2. Gastroesophageal reflux disease.      3. Postnasal drip.      4. Recent incidental finding of atrial fibrillation noted on cardiac monitoring     during six minute walk test.      5. Hiatal hernia, status post surgical repair.      6. Tobacco abuse with cigarettes in remission.              PLAN:      1. I have discussed with the patient that six minute walk test did not show any     oxygen desaturation, but rhythm strip did appear to show atrial fibrillation.      He has sounds to have a regular rate and rhythm today, so I suspect that he has     paroxysmal atrial fibrillation which is why it has not been noted on previous     EKGs.        2. I have recommended he follow up with cardiology for this. He prefers to see a    cardiologist that goes to [place] so he does not have to drive as far.  I     will have him refer to a cardiology who does go to [place] for evaluation of    new afib and further evaluation and management as appropriate can be done there.     I have discussed with patient that since his PFTs were only done a couple of     days ago I do not even have the initial report yet scanned into our system to     review with him, so that will be reviewed with him by Dr. Delgado at his next     visit.  In the meantime, he should continue on Advair and Tudorza. He should     continue on PPI for gastroesophageal reflux disease, keep the head of his bed     elevated and not eat within 3 hours of bedtime.      3. I will have him keep his next follow up appointment and call us  sooner if     needed.            Patient Education      Patient Education Provided:  COPD, How to use an Inhaler      Time Spent:  > 50% /Coord [facility]                 Disclaimer: Converted document may not contain table formatting or lab diagrams. Please see [employer] System for the authenticated document.

## 2021-05-28 NOTE — PROGRESS NOTES
Patient: WESLEY CUNNINGHAM     Acct: [id number]     Report: #[id number]  UNIT #: [id number]     : 1932    Encounter Date:2021  PRIMARY CARE: CHEYENNE SMITH  ***Signed***  --------------------------------------------------------------------------------------------------------------------  History of Present Illness            Chief Complaint: 6W F/U Dyspnea, Pulmonary Nodule, COPD, Hypertension             Wesley Cunningham is presenting for evaluation via Telehealth visit. Verbal     consent obtained before beginning visit.            PAST MEDICAL HISTORY/OVERVIEW OF PATIENT SYMPTOMS            Symptoms: None             Any known Exposure to COVID-19: No             Never Smoker             Flu: UTD            Pneum: UTD            Provider spent 19 minutes with the patient during telehealth visit.            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  The patient was seen in our office back     in January and was placed on rifampin, ethambutol and azithromycin to take     Monday, Wednesday and Friday.  The patient states that he is compliant with his     medication and is tolerating it well. His only complaint is that he is having     some diarrhea. He states that no sooner does he sit down to eat breakfast that     he has to go to the restroom.  He states that it is nonbloody. He is having no     significant abdominal pain, no abdominal swelling. No fevers. He has not lost     weight. He does take a probiotic, but has since run out in the past few days and    needs to  some more.  He does feel like he has some decreased energy in     the morning and it takes him until about lunch time or dinner time before he fee    ls he is able to do  much. He denies any chest pain, chest tightness, nausea or     vomiting. He has no fever or chills.  He continues with some shortness of breath    and a cough, however he states that it is no worse than previous.  He is     complaining today of a runny nose as well. He states that he has nasal drainage     all day. It is nonbloody. He has never been diagnosed with seasonal allergies in    the past. He has tried Flonase at home without success. He states that he forgot    when he was due for a CBC and CMP for his therapeutic drug monitoring for     rifampin, ethambutol and azithromycin.              Review of systems:  10/14 review of systems obtained and negative unless     otherwise stated in the HPI.              I have personally reviewed laboratory data, imaging as well as previous medical     records.             I reviewed Dr. Delgado's last office visit note from 01/04/2021.            Physical exam deferred due to TeleHealth visit.              Allergies and Medications      Allergies:        Coded Allergies:             MORPHINE (Verified  Allergy, Severe, confused, 2/19/21)           LEVOFLOXACIN (Verified  Allergy, Intermediate, nausea, 2/19/21)                  can take iv form      Uncoded Allergies:             IV DYE (Allergy, Unknown, EYES SWOLLEN FOREHEAD RASH, 3/2/18)      Medications    Last Reconciled on 2/19/21 10:59 by JESSICA GREWAL      Fluticasone-Salmeterol 250-50 (Wixela 250-50 Inhub) 1 Each Blst.w.dev      1 PUFF INH BID for 30 Days, #1 INH 3 Refills         Prov: Blu Delgado         1/8/21       Doxycycline Hyclate (Doxycycline Hyclate*) 100 Mg Capsule      100 MG PO BID, #14 CAP 0 Refills         Prov: Blu Delgado         1/4/21       Ciprofloxacin HCl (Ciprofloxacin HCl) 750 Mg Tablet      750 MG PO BID, #30 TAB 0 Refills         Prov: Blu Delgado         1/4/21       Ethambutol (Ethambutol) 400 Mg Tablet      800 MG PO MOWEFR for 30 Days, #24 TAB 5 Refills         Prov: Blu Delgado          1/4/21       rifAMPin (rifAMPin) 300 Mg Cap      600 MG PO MOWEFR for 30 Days, #12 CAP 5 Refills         Prov: Blu Delgado         1/4/21       Azithromycin (Azithromycin) 500 Mg Tablet      500 MG PO MOWEFR for 30 Days, #12 TAB 5 Refills         Prov: Blu Delgado         1/4/21       Sucralfate (Sucralfate) 1 Gm Tablet      1 GM PO QIDAC, TAB         Reported         1/4/21       Lactobacillus Rhamnosus  (Probiotic Digestive Care) 1 Each Capsule      1 EACH PO, CAP         Reported         11/24/20       Fluticasone (Flovent HFA) 44 Mcg Inhaler      2 PUFFS INH RTBID, INH         Reported         11/24/20       Aclidinium Bromide (Tudorza) 400 Mcg Inh      1 PUFF INH RTBID, #1 MDI 0 Refills         Reported         11/24/20       (Eye Vitamin )   No Conflict Check      PO HS         Reported         11/24/20       (Juvenon)   No Conflict Check      PO BID         Reported         11/24/20       Multivitamins (Multi-Vitamin) 1 Each Tablet      1 TAB PO QDAY, #30 TAB 0 Refills         Reported         11/24/20       Meloxicam (Meloxicam*) 15 Mg Tablet      15 MG PO QDAY, #30 TAB 0 Refills         Reported         11/24/20       Tamsulosin HCL (Tamsulosin HCL) 0.4 Mg Capsule      0.4 MG PO QDAY, #30 CAP 0 Refills         Reported         9/15/20       Metoprolol Tartrate (Metoprolol Tartrate) 25 Mg Tablet      12.5 MG PO BID, #60 TAB 0 Refills         Reported         9/15/20       Furosemide (Lasix) 20 Mg Tablet      20 MG PO QDAY, #30 TAB 0 Refills         Reported         9/15/20       Aspirin EC (Aspirin EC) 81 Mg Tablet.dr      81 MG PO QDAY, #30 TAB.EC 0 Refills         Reported         9/1/20       Coenzyme Q10 (Co Q-10) 100 Mg Capsule      100 MG PO QDAY for 30 Days, #30 CAP         Reported         9/1/20       Apixaban (Eliquis) 5 Mg Tablet      5 MG PO BID for 30 Days, #60 TAB         Reported         9/1/20       Lisinopril* (Lisinopril*) 5 Mg Tablet      2.5 MG PO QDAY, #30 TAB 0 Refills          Reported         9/1/20       Calcium Carbonate/Vitamin D3 (Calcium 600-Vit D3 400 Tablet) 1 Each Tablet      1 EACH PO QDAY, #60 TAB 0 Refills         Reported         8/21/20       Finasteride (Finasteride*) 5 Mg Tablet      5 MG PO HS, #30 TAB 0 Refills         Reported         8/21/20       Omeprazole (Omeprazole*) 20 Mg Tablet.dr      20 MG PO QDAY, #30 CAP 0 Refills         Reported         8/21/20       Venlafaxine HCl XR (Venlafaxine XR*) 150 Mg Tab.er.24      150 MG PO QDAY, CAP         Reported         8/21/20       Meclizine Hcl (Meclizine*) 25 Mg Tablet      25 MG PO BID, #60 TAB 0 Refills         Reported         8/21/20       (B12)   No Conflict Check      2500 UNITS PO QDAY         Reported         3/2/18       Cholecalciferol (Vitamin D3) (Vitamin D3) 1,000 Unit Tab      2000 UNITS PO QDAY, TAB         Reported         11/4/13            Assessment      Shortness of Air  R06.02            Plan      Orders:  Phone Eval 11-20 mi [id number]      Instructions      * Chronic conditions reviewed and taken in consideration for today's treatment       plan.      * Plan Of Care: ()      * Patient instructed to seek medical attention urgently for new or worsening       symptoms.      * Patient was educated/instructed on their diagnosis, treatment and medications       today.      * Recommend self monitoring. Instructions given.      * Recommend self quarantine for 14 days.      * Recommend self quarantine until without fever for 72 hours without using fever       reducing medications.      * Recommends over the counter medications for symptom management.            ASSESSMENT:       1. 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      2. History of MRSA, pseudomonas and klebsiella persistent pneumonia.      3.  Chronic dyspnea on exertion.      4. Chronic cough.      5.  Rhinorrhea.            PLAN:      1.  Continue rifampin, ethambutol and azithromycin.      2. We will order a CBC and CMP to be obtained as  soon as the patient is able.      3. I advised the patient to  a probiotic to see if we can help his     diarrhea.      4. We will send in a prescription for Dymista given the patient's complaint of     rhinorrhea.      5. I advised the patient to call our office with any questions, or go to the ED     with any new concerning complaints.  He expressed understanding.      6.  We will refer the patient to Dr. Johnson with podiatry as he is complaining     of a nonhealing foot wound that has been present for several months. He denied     any purulent drainage, fever or severe surrounding erythema.        7. We will have the patient follow up with Dr. Delgado in clinic in 8 weeks,     sooner if needed. He will be due for a repeat CBC and CMP in one month after he     completes the ones that will be obtained today.  I will place the order for     these labs to be obtained in one month.            Electronically signed by JESSICA GREWAL  03/16/2021 06:39  Electronically signed by Blu Delgado  05/01/2021 19:07       Disclaimer: Converted document may not contain table formatting or lab diagrams. Please see [employer] for the authenticated document.

## 2021-05-28 NOTE — PROGRESS NOTES
Patient: WESLEY CUNNINGHAM     Acct: [id number]     Report: #[id number]  UNIT #: [id number]     : 1932    Encounter Date:2020  PRIMARY CARE: CHEYENNE SMITH  ***Signed***  --------------------------------------------------------------------------------------------------------------------  TELEHEALTH NOTE      History of Present Illness            Chief Complaint: 1w f/u            Wesley Cunningham is presenting for evaluation via Telehealth visit by phone.     Verbal consent obtained before beginning visit.            Provider spent 21 minutes with the patient during telehealth visit.            The following staff were present during the visit: Susan Leblanc MA, Blu Delgado MD            The patient is an 88 year old male who was seen last week with worsening     shortness of breath, cough and fatigue.  At that time, chest CT scan and NT-    proBNP was checked.  NT-proBNP was high.  CT scan showed bilateral small pleural    effusion and some tree in bud opacities concerning for active infection. Because    of that, the patient was given prednisone, was also given antibiotics with     Augmentin 875 mg twice a day and was given Lasix.  He is currently taking those     medications. He feels like his BP was up and down. He is still fatigued and has     night sweats.  He has cough, but is not producing much phlegm. He is short of     breath with minimal activities, but overall his shortness of breath is     improving. His leg swelling is slightly improved. He is going to see his PCP     next week.  He feels tired, but overall is somewhat better. He still is not back    to his normal self.              Physical exam deferred due to TeleHealth visit.              The patient's blood work from last week showed NT-proBNP at 3560.  Kidney func    tion was normal. Renal function was normal.              Patient: WESLEY CUNNINGHAM   Acct: #[id number]   Report: #[id number]            UNIT #: [id number]     DOS:       LOCATION:[facility]     : 1932            PROVIDERS      ADMITTING:     FAMILY:  MD NONE         OTHER:       DICTATING:  Blu Delgado MD            REASON FOR VISIT:  COPD            *******Signed******                                    [facility]                          [facility]                            [place]  [id number]               __________________________________________________________________________             Patient Name:                   Attending Physician:      Wesley Cunningham M.D.             Patient Visit # MR #            Admit Date  Disch Date     Location      [id number]    [id number]      2019                 [facility]- -             Date of Birth      1932      __________________________________________________________________________      821 - DIAGNOSTIC REPORT             PULMONARY FUNCTION TEST             SPIROMETRY:      Spirometry shows moderate obstructive defect.      FEV1/FVC ratio is 51%.      FEV1 is 1.62 L, 66% of predicted.      FVC is 3.16 L, 89% of predicted.             BRONCHODILATOR RESPONSE:      There is no significant response to bronchodilator administration.             LUNG VOLUMES:      Lung volumes show hyperinflation and air trapping.      Total lung capacity is 9.43 L, 137% of predicted.      Residual volume is 6.26 L, 227% of predicted.             DIFFUSION:      Diffusion capacity is normal, 99% of predicted.             FLOW VOLUME LOOP:      Flow volume loop is suggestive of obstructive process.             CONCLUSION:      Low FEV1/FVC with low FEV1, normal FVC with air trapping and hyperinflation      and normal diffusion capacity.  It suggest moderate obstructive airway      disease, likely chronic bronchitis phenotype.  Please correlate clinically.             To be electronically signed in [place]      [id number] BLU DELGADO M.D.              NK:aw      D:  2019 17:44      T:  2019 18:21      #[id number]             Until signed, this is an unconfirmed preliminary report that may contain      errors and is subject to change.                   Until signed, this is an unconfirmed preliminary report that may contain      errors and is subject to change.                     <Electronically signed by Blu Delgado MD>                19 1018               Blu Delgado MDA:AMW      D:19 1744                     [facility]                PACS RADIOLOGY REPORT            Patient: MARCIO HERNANDEZ   Acct: #[id number]   Report: #[id number]            UNIT #: [id number]    DOS: 20 1345      INSURANCE:MEDICARE PART A   LOCATION:[facility]     : 1932            PROVIDERS      ADMITTING:     ATTENDING: Blu Delgado      FAMILY:  NONE,MD   ORDERING:  Blu Delgado         OTHER: CHEYENNE SMITH   DICTATING:  MAJOR CAMERON MD            REQ #:[id number]   [facility]      REASON FOR EXAM:        REASON FOR VISIT:  COUGH            *******Signed******         PROCEDURE:   CT CHEST WITHOUT CONTRAST             COMPARISON:   None.             INDICATIONS:   PATIENT STATES POSS PNEUMONIA, SOA             TECHNIQUE:   CT images were created without the administration of contrast     material.               PROTOCOL:     Standard imaging protocol performed                RADIATION:     DLP: 447.3mGy*cm          Automated exposure control was utilized to minimize radiation dose.              FINDINGS:         Patchy bilateral pulmonary nodules, some of which have tree-in-bud configurati    on.  An index nodule       in the posterior left upper lobe measures 7 mm (image 26).  No dense consolida    tion.  Linear       opacities in the lung bases, likely atelectasis or scarring.  Small bilateral      pleural effusions.        Cardiomegaly.  Coronary artery calcification.  No adenopathy in the chest.             Small hiatal hernia.  No acute findings in the included upper abdomen.  No     aggressive appearing       bone change.             CONCLUSION:   Patchy bilateral pulmonary nodules, favored to represent     infectious or inflammatory       change.  Recommend a follow-up chest CT in 3-6 months.             No dense consolidation.             Small bilateral pleural effusions.              MAJOR CAMERON MD             Electronically Signed and Approved By: MAJOR CAMERON MD on 6/16/2020 at 13:01                        Until signed, this is an unconfirmed preliminary report that may contain      errors and is subject to change.                                              DOWER:      D:06/16/20 1304                         Past Med History      COPD      Never smoked      Vaccines current      Overview of Symptoms      Pt complains of generally not feeling good, fatigue            Most Recent Lab Findings      Laboratory Tests      6/16/20 11:22            Allergies/Medications      Allergies:        Coded Allergies:             LEVOFLOXACIN (Verified  Allergy, Unknown, NAUSEA/VOMITING, 6/24/20)                  PT. STATED HE CAN TAKE IT IV THOUGH, NO REACTION      Uncoded Allergies:             IV DYE (Allergy, Unknown, EYES SWOLLEN FOREHEAD RASH, 3/2/18)      Medications    Last Reconciled on 6/24/20 15:33 by BLU DELGADO MD      Amoxicillin/Clavulanate K (Augmentin 875/125 Mg) 1 Each Tablet      875 MG PO BID, #20 TAB 0 Refills         Prov: Blu Delgado         6/17/20       Furosemide (Furosemide) 20 Mg Tablet      20 MG PO QDAY, #30 TAB 0 Refills         Prov: Blu Delgado         6/17/20       Budesonide/Formoterol Fumarate (Symbicort 160/4.5 Mcg) 10.2 Gm Inh               Prov: Blu Delgado         6/17/20       Aclidinium Bromide (Tudorza) 400 Mcg Inh               Prov: Blu Delgado         6/16/20        Omeprazole (Omeprazole*) 40 Mg Capsule      40 MG PO HS, #30 CAP 0 Refills         Reported         6/16/20       Nathan-Fluticasone (Fluticasone 50 mcg) 16 Gm Spray.susp      2 PUFFS NARE EACH QDAY, #1 BOTTLE 6 Refills         Prov: Blu Delgado         12/9/19       MDI-Albuterol (Ventolin HFA) 8 Gm Hfa.aer.ad      2 PUFFS INH Q4-6H PRN for DYSPNEA, #3 INH 3 Refills         Prov: Blu Delgado         12/9/19       amLODIPine (amLODIPine) 5 Mg Tablet      5 MG PO BID, #60 TAB         Reported         12/9/19       Finasteride (Finasteride*) 5 Mg Tablet      5 MG PO QDAY, #30 TAB 0 Refills         Reported         7/18/19       (B12)   No Conflict Check      2500 UNITS PO QDAY         Reported         3/2/18       (Eye Vitamin)   No Conflict Check      QDAY         Reported         3/2/18       Meclizine Hcl (Meclizine*) 25 Mg Tablet      25 MG PO BID, #60 TAB 0 Refills         Reported         7/1/15       DULoxetine (Cymbalta) 60 Mg Capsule      60 MG PO QDAY, #30 CAP 0 Refills         Reported         11/3/14       Multivitamins (Multi-Day Vitamin) 1 Tab Tablet      1 TAB PO QDAY, TAB         Reported         11/7/13       Tamsulosin HCL (Flomax) 0.4 Mg Cap.sr.24h      0.4 MG PO BID, CAP         Reported         11/7/13       Cholecalciferol (Vitamin D3) (Vitamin D3) 1,000 Unit Tab      2000 UNITS PO QDAY, TAB         Reported         11/4/13            Plan/Instructions      Ambulatory Assessment/Plan:        Notes      Discontinued Medications      * MDI-Advair 250/50 (Advair 250/50 Diskus) 1 EACH BLST.W.DEV: 1 PUFF INH BID 90       Days #3      * Aclidinium Bromide (Tudorza) 400 MCG INH: 1 PUFF INH BID #1         Dx: COPD (chronic obstructive pulmonary disease) - J44.9      * Budesonide/Formoterol Fumarate (Symbicort 160/4.5 Mcg) 10.2 GM INH          Sample - Qty 2         Instructions: 2 puffs BID         Dx: COPD (chronic obstructive pulmonary disease) - J44.9      * predniSONE 20 MG TABLET: 40 MG PO QDAY #10       * Mometasone/Formoterol (Dulera 200 Mcg/5 Mcg) 13 GM HFA.AER.AD: 2 PUFFS INH       RTBID 30 Days #1      Plan/Instructions            * Plan Of Care: ()            * Chronic conditions reviewed and taken into consideration for today's treatment       plan.      * Patient instructed to seek medical attention urgently for new or worsening       symptoms.      * Patient was educated/instructed on their diagnosis, treatment and medications       prior to discharge from the clinic today.            PLAN:  The patient is an 88 year old male with a history of moderate COPD and     recurrent bronchitis. He has pneumonia and congestive heart failure.      1.  Possible pneumonia. Continue with Augmentin.  He has five more pills of     Augmentin left.  Continue with Lasix at 40 mg once daily.  Continue with     Symbicort.  Continue to use albuterol as needed.        2. If his symptoms are not improved on current treatment, he may need a sputum     culture or bronchoscopy.        3. He may need to follow up with advanced care providers in the next 1-2 weeks     if he is not improving.        4. He has fluctuations of his BP and is going to talk to his PCP about it.  He     has night sweats which is concerning for indolent infection as well. We may need     to consider bronchoscopy.      5. Follow up in 2-3 months, earlier if needed.      Codes:  Phone Eval 21-30 mi [id number]            Electronically signed by Blu Delgado  07/08/2020 16:21       Disclaimer: Converted document may not contain table formatting or lab diagrams. Please see [employer] for the authenticated document.

## 2021-05-28 NOTE — PROGRESS NOTES
Patient: MARCIO HERNANDEZ     [id number]     Report: #[id number]  UNIT #: [id number]     : 1932    Encounter Date:2020  PRIMARY CARE: CHEYENNE SMITH  ***Signed***  --------------------------------------------------------------------------------------------------------------------  Chief Complaint      Encounter Date      Sep 1, 2020            Primary Care Provider      CHEYENNE SMITH            Referring Provider      MICHAEL URIOSTEGUI            Patient Complaint      Patient is complaining of      Pt here for broch fu, copd            VITALS      Height 5 ft 9 in / 175.26 cm      Weight 145 lbs 0 oz / 65.744541 kg      BSA 1.80 m2      BMI 21.4 kg/m2      Temperature 96.6 F / 35.89 C - Temporal      Pulse 72      Respirations 16      Blood Pressure 93/68 Sitting, Right Arm      Pulse Oximetry 98%, Room air            HPI      The patient is a very pleasant 88 year old white male patient of Dr. Delgado's     last seen by him in the office several weeks ago. He had a CT scan of the chest     done in 2020 showing infectious appearing nodular infiltrates. He has had     moderate chronic obstructive pulmonary disease and recurrent bronchitis in the     past and has had worsening shortness of breath, cough, wheezing, fatigue and     lack of energy for several months. Dr. Delgado took him for bronchoscopy a week     ago and he grew MRSA pseudomonas and klebsiella. He is already on treatment for     this with doxycycline and Levaquin and has been on these for about 4-5 days. The    patient is complaining of that he still feels short of breath, still has some     cough, denies hemoptysis, fever or chills. He is still feeling weaker than his     baseline. He is very concerned that he is not going to be able to get back to     doing his yard work and is visually distressed and at times emotional today. He     just feels like he has not been feeling well for a long time.  He was     hospitalized at an outside  hospital since his bronchoscopy and was told he had     atrial fibrillation and congestive heart failure. He is seeing Dr. Espinosa with     cardiology at the end of this week for this. These are both new diagnoses for     the patient. He reports compliance with Wixela and tudorza inhalers and feel     they help him. He does not have a nebulizer machine. He was discharged from the     outside hospital with oxygen to use with sleep and as needed during the day and     feels like it is helping.             I reviewed the Review of Systems, medical, surgical and family history and agree    with those as entered.      Copies To:   Blu Delgado      Constitutional:  Complains of: Fatigue; Denies: Fever, Weight gain, Weight loss,    Chills, Insomnia, Other      Respiratory/Breathing:  Denies: Shortness of air, Wheezing, Cough, Hemoptysis,     Pleuritic pain, Other      Endocrine:  Denies: Polydipsia, Polyuria, Heat/cold intolerance, Diabetes, Other      Eyes:  Denies: Blurred vision, Vision Changes, Other      Ears, nose, mouth, throat:  Denies: Congestion, Dysphagia, Hearing Changes, Nose    Bleeding, Nasal Discharge, Throat pain, Tinnitus, Other      Cardiovascular:  Denies: Chest Pain, Exertional dyspnea, Peripheral Edema,     Palpitations, Syncope, Wake up Gasping for air, Orthopnea, Tachycardia, Other      Gastrointestinal:  Complains of: Nausea; Denies: Abdominal pain/cramping, Bloody    stools, Constipation, Diarrhea, Melena, Vomiting, Other      Genitourinary:  Denies: Dysuria, Urinary frequency, Incontinence, Hematuria,     Urgency, Other      Musculoskeletal:  Denies: Joint Pain, Joint Stiffness, Joint Swelling, Myalgias,    Other      Hematologic/lymphatic:  DENIES: Lymphadenopathy, Bruising, Bleeding tendencies,     Other      Neurologic:  Denies: Headache, Numbness, Weakness, Seizures, Other      Psychiatric:  Denies: Anxiety, Appropriate Effect, Depression, Other      Sleep:  No: Excessive  daytime sleep, Morning Headache?, Snoring, Insomnia?, Stop    breathing at sleep?, Other      Integumentary:  Denies: Rash, Dry skin, Skin Warm to Touch, Other            FAMILY/SOCIAL/MEDICAL HX      Surgical History:  Yes: Bowel Surgery (COLONOSCOPY), Cholecystectomy, Eye     Surgery, Head Surgery (BILATERAL CATARACTS/LENS IMPLANTS), Hernia Surgery,     Orthopedic Surgery (RIGHT ELBOW BURSECTOMY ); No: AAA Repair, Abdominal Surgery,    Angioplasty, Appendectomy, Back Surgery, Bladder Surgery, Breast Surgery, CABG,     Carotid Stenosis, Ear Surgery, Kidney Surgery, Nose Surgery, Oral Surgery,     Prostatectomy, Rectal Surgery, Spinal Surgery, Testicular Surgery, Throat     Surgery, Valve Replacement, Vascular Surgery, Other Surgeries      Diabetes - Family Hx:  Child      Is Father Still Living?:  No      Is Mother Still Living?:  No      Smoking status:  Never smoker      Anticoagulation Therapy:  No      Antibiotic Prophylaxis:  No      Medical History:  Yes: Arthritis (GENERALIZED), Chemotherapy/Cancer (SKIN CANCER    REMOVED EAR AND BACK AREA), Chronic Bronchitis/COPD (INHALERS), Seizures,     Hemorrhoids/Rectal Prob (ACID REFLUX), Hiatal Hernia, High Blood Pressure (ON     MEDS), Shortness Of Breath (HX OF EMPHYSEMA, BRONCHITIS), Miscellaneous     Medical/oth (RESTLESS LEG SYNDROME); No: Anemia, Asthma, Blood Disease, Broken     Bones, Cataracts, Chemical Dependency, Emphysema, Chronic Liver Disease, Colon     Trouble, Colitis, Diverticulitis, Congestive Heart Failu, Deafness or Ringing     Ears, Depression, Anxiety, Bipolar Disorder, Diabetes, Epilepsy, Glaucoma, Gall     Stones, Gout, Head Injury, Heart Attack, Heart Murmur, Hepatitis, HIV (Do not     ask - volu, Kidney or Bladder Disease, Kidney Stones, Migrane Headaches, Mitral     Valve Prolapse, Psychiatric Care, Reflux Disease, Rheumatic Fever, Sexually     Transmitted Dis, Sinus Trouble, Skin Disease/Psoriais/Ecz, Stroke, Thyroid     Problem,  Tuberculosis or Pos TB Te      Psychiatric History      None            PREVENTION      Hx Influenza Vaccination:  Yes      Date Influenza Vaccine Given:  Oct 1, 2019      Influenza Vaccine Declined:  No      2 or More Falls in Past Year?:  No      Fall Past Year with Injury?:  No      Hx Pneumococcal Vaccination:  Yes      Encouraged to follow-up with:  PCP regarding preventative exams.      Chart initiated by      Susan Leblanc MA            ALLERGIES/MEDICATIONS      Allergies:        Coded Allergies:             LEVOFLOXACIN (Verified  Allergy, Unknown, NAUSEA/VOMITING, 9/1/20)                  PT. STATED HE CAN TAKE IT IV THOUGH, NO REACTION      Uncoded Allergies:             IV DYE (Allergy, Unknown, EYES SWOLLEN FOREHEAD RASH, 3/2/18)      Medications    Last Reconciled on 9/1/20 10:49 by KATHERINE CHEN      Oxygen (OXYGEN)  Gas      #2         Reported         9/1/20       Aspirin EC (Aspirin EC) 81 Mg Tablet.dr      81 MG PO QDAY, #30 TAB.EC 0 Refills         Reported         9/1/20       Coenzyme Q10 (Co Q-10) 100 Mg Capsule      100 MG PO QDAY for 30 Days, #30 CAP         Reported         9/1/20       Doxycycline Hyclate (Doxycycline Hyclate*) 100 Mg Capsule      100 MG PO BID, #14 CAP 0 Refills         Reported         9/1/20       Apixaban (Eliquis) 5 Mg Tablet      5 MG PO BID for 30 Days, #60 TAB         Reported         9/1/20       Metoprolol Succinate (Metoprolol Succinate) 25 Mg Tab.er.24h      12.5 MG PO BID, #30 TAB 0 Refills         Reported         9/1/20       Lisinopril* (Lisinopril*) 5 Mg Tablet      5 MG PO QDAY, #30 TAB 0 Refills         Reported         9/1/20       levoFLOXacin (levoFLOXacin) 500 Mg Tablet      500 MG PO QDAY, TAB 0 Refills         Reported         9/1/20       Mirtazapine (Mirtazapine*) 15 Mg Tablet      15 MG PO QDAY, #30 TAB 0 Refills         Reported         8/21/20       Aclidinium Bromide (Tudorza) 400 Mcg Inh      1 PUFF INH RTBID, #1 MDI 0 Refills          Reported         8/21/20       Calcium Carb/Vit D3 (CALCIUM 600-VIT D3 400 TABLET) 1 Each Tablet      1 EACH PO QDAY, #60 TAB 0 Refills         Reported         8/21/20       Finasteride (Finasteride*) 5 Mg Tablet      5 MG PO HS, #30 TAB 0 Refills         Reported         8/21/20       Omeprazole (Omeprazole*) 20 Mg Tablet.dr      20 MG PO QDAY, #30 CAP 0 Refills         Reported         8/21/20       Venlafaxine HCl XR (Venlafaxine XR*) 150 Mg Tab.er.24      150 MG PO QDAY, CAP         Reported         8/21/20       Meclizine Hcl (Meclizine*) 25 Mg Tablet      25 MG PO BID, #60 TAB 0 Refills         Reported         8/21/20       MDI-Albuterol (Ventolin HFA) 8 Gm Hfa.aer.ad      2 PUFFS INH Q4-6H PRN for DYSPNEA, #1 INH 6 Refills         Prov: SandyBlu         8/19/20       Furosemide (Furosemide) 20 Mg Tablet      20 MG PO BID@09,17, #60 TAB 0 Refills         Prov: Blu Delgado         8/19/20       Meloxicam (Meloxicam*) 7.5 Mg Tablet      15 MG PO QDAY, #60 TAB 0 Refills         Reported         8/19/20       (B12)   No Conflict Check      2500 UNITS PO QDAY         Reported         3/2/18       Cholecalciferol (Vitamin D3) (Vitamin D3) 1,000 Unit Tab      2000 UNITS PO QDAY, TAB         Reported         11/4/13      Current Medications      Current Medications Reviewed 9/1/20            EXAM      CONSTITUTIONAL: Pleasant male in no acute distress,  normal conversant.       EYES : Pink conjunctive, no ptosis, PERRL.       ENMT : Nose and ears appear normal, normal dentition, mild posterior pharyngeal     wall erythema, no sinus tenderness. Mallampati classification       Neck: Nontender, no masses, no thyromegaly, no nodules.      Resp : Mildly decreased breath sounds throughout, no wheezes, rhonchi or     crackles, normal work of breathing noted.        CVS  : No carotid bruits, s1s2 nl, RRR, no murmur, rubs or gallop, no peripheral     edema       Chest wall: Normal rise with inspiration, nontender on  palpation.      GI   : Abdomen soft, with no masses, no hepatosplenomegaly, no hernias, BS+      MSK  : Normal gait and station, no digital cyanosis or clubbing       Skin : No rashes, ulcerations or lesions, normal turgor and temperature      Neuro: CN II - XII intact, no sensory deficits, DTRs intact and symmetrical, no     motor weakness      Psych: Appropriate affect, A   Vitals      Vitals:             Height 5 ft 9 in / 175.26 cm           Weight 145 lbs 0 oz / 65.935274 kg           BSA 1.80 m2           BMI 21.4 kg/m2           Temperature 96.6 F / 35.89 C - Temporal           Pulse 72           Respirations 16           Blood Pressure 93/68 Sitting, Right Arm           Pulse Oximetry 98%, Room air            REVIEW      Results Reviewed      PCCS Results Reviewed?:  Yes Prev Lab Results, Yes Prev Radiology Results, Yes     Previous Mecial Records      Lab Results      I personally reviewed the patient's recent bronchoscopy report, microbiology and     pathology results.      Radiographic Results               [facility] Diagnostic Img                PACS RADIOLOGY REPORT            Patient: MARCIO HERNANDEZ   Acct: #[id number]   Report: #[id number]            UNIT #: [id number]    DOS: 20 1345      INSURANCE:MEDICARE PART A   LOCATION:[facility]     : 1932            PROVIDERS      ADMITTING:     ATTENDING: Blu Delgado      FAMILY:  NONE,MD   ORDERING:  Blu Delgado         OTHER: CHEYENNE SMITH   DICTATING:  MAJOR CAMERON MD            REQ #:[id number]   EXAM:WO - CT CHEST without CONTRAST      REASON FOR EXAM:        REASON FOR VISIT:  COUGH            *******Signed******         PROCEDURE:   CT CHEST WITHOUT CONTRAST             COMPARISON:   None.             INDICATIONS:   PATIENT STATES POSS PNEUMONIA, SOA             TECHNIQUE:   CT images were created without the administration of contrast     material.               PROTOCOL:     Standard  imaging protocol performed                RADIATION:     DLP: 447.3mGy*cm          Automated exposure control was utilized to minimize radiation dose.              FINDINGS:         Patchy bilateral pulmonary nodules, some of which have tree-in-bud     configuration.  An index nodule       in the posterior left upper lobe measures 7 mm (image 26).  No dense consolid    ation.  Linear       opacities in the lung bases, likely atelectasis or scarring.  Small bilateral     pleural effusions.        Cardiomegaly.  Coronary artery calcification.  No adenopathy in the chest.             Small hiatal hernia.  No acute findings in the included upper abdomen.  No     aggressive appearing       bone change.             CONCLUSION:   Patchy bilateral pulmonary nodules, favored to represent     infectious or inflammatory       change.  Recommend a follow-up chest CT in 3-6 months.             No dense consolidation.             Small bilateral pleural effusions.              MAJOR CAMERON MD             Electronically Signed and Approved By: MAJOR CAMERON MD on 6/16/2020 at 13:01                        Until signed, this is an unconfirmed preliminary report that may contain      errors and is subject to change.                                              JOVANER:      D:06/16/20 1301            Assessment      Pneumonia - J18.9            Notes      New Medications      * levoFLOXacin 500 MG TABLET: 500 MG PO QDAY      * Lisinopril* 5 MG TABLET: 5 MG PO QDAY #30      * Metoprolol Succinate 25 MG TAB.ER.24H: 12.5 MG PO BID #30      * Apixaban (Eliquis) 5 MG TABLET: 5 MG PO BID 30 Days #60      * Doxycycline Hyclate (Doxycycline Hyclate*) 100 MG CAPSULE: 100 MG PO BID #14      * COENZYME Q10 (Co Q-10) 100 MG CAPSULE: 100 MG PO QDAY 30 Days #30      * Aspirin EC 81 MG TABLET.DR: 81 MG PO QDAY #30      * OXYGEN GAS: #2      * Albuterol/Ipratropium (Duoneb) 3 ML AMPUL.NEB: 3 ML INH Q4H PRN SHORTNESS OF       BREATH #120          Instructions: DIAGNOSIS CODE REQUIRED PRIOR TO PRESCRIBING.         Dx: Pneumonia - J18.9      Discontinued Medications      * predniSONE (Deltasone) 10 MG TABLET: 10 MG PO ASDIR #45         Instructions: [id number]      New Diagnostics      * Chest W/O Cont CT, 2 Months         Dx: Pneumonia - J18.9      ASSESSMENT:      1. Recent pneumonia secondary to MRSA, pseudomonas and klebsiella currently on     doxycycline and Levaquin for 2 weeks each.       2. Moderate chronic obstructive pulmonary disease with acute exacerbation.       3. History of recurrent bronchitis.       4. Cough.       5. Dyspnea.       6. Fatigue.       7. Atrial fibrillation being followed by Dr. Espinosa and recently diagnosed.       8. Congestive heart failure being followed by Dr. Espinosa recently diagnosed.       9. Insomnia.             PLAN:      1. I have discussed with the patient and his daughter in detail regarding his     current symptoms, bronchoscopy results and plans going forward.       2. I counseled the patient extensively on the importance of completing     doxycycline and Levaquin as prescribed. He is having no issues tolerating them     as long as he take zofran as needed.       3. I encouraged him to keep his oral intake of food and drink up even if he has     minimal appetite. The patient verbalized understanding.       4. Continue Wixela and Tudorza as prescribed. I will give him a nebulizer     machine and prescribe DuoNeb to use as needed.       5. The patient is complaining of issues sleeping, likely related to prednisone,     I will have him complete his prednisone taper and start melatonin 5 mg nightly.     He would like to get this over the counter rather than by prescription and his     daughter will assist with this.       6. I have discussed with the patient and his daughter regarding his recent     bronchoscopy results. I have discussed that his pathology is negative for     malignant  cells and he grew klebsiella, MRSA and pseudomonas and will need to     complete a 2 week course of Levaquin and doxycycline.       7. I have encouraged the patient to gradually increase his activity as tolerated     and continue oxygen with exertion as needed to keep O2 sats at or above 89% and     continue oxygen with sleep for now.       8. I will repeat a CT scan of the chest in 2 months to ensure resolution of     nodular infiltrates.       9. Follow up with cardiology Dr. Espinosa as scheduled.       10.  Follow up closely with us in 2-3 weeks to ensure he is clinically     improving.            Patient Education      Patient Education Provided:  COPD, How to use an Inhaler, How to use a Nebulizer      Time Spent:  > 50% /Coord Care            Electronically signed by ROMAIN PEREZ PA-C  09/01/2020 15:59       Disclaimer: Converted document may not contain table formatting or lab diagrams. Please see [email or web address] for the authenticated document.

## 2021-05-28 NOTE — PROGRESS NOTES
Patient: MARCIO HERNANDEZ     Acct: [id number]     Report: #[id number]  UNIT #: [id number]     : 1932    Encounter Date:2020  PRIMARY CARE: CHEYENNE SMITH  ***Signed***  --------------------------------------------------------------------------------------------------------------------  Chief Complaint      Encounter Date      2020            Primary Care Provider      CHEYENNE SMITH            Referring Provider      MICHAEL URIOSTEGUI            Patient Complaint      Patient is complaining of      Patient here today for 6 month F/U, COPD            VITALS      Height 5 ft 9.00 in / 175.26 cm      Weight 164 lbs  / 74.676369 kg      BSA 1.90 m2      BMI 24.2 kg/m2      Temperature 97.7 F / 36.5 C - Temporal      Pulse 78      Respirations 14      Blood Pressure 150/90 Sitting, Left Arm      Pulse Oximetry 98%, room air      Initial Exhaled Nitrous Oxide      Exhaled Nitrous Oxide Results:  9            HPI      The patient is a 88 year old male with moderate chronic obstructive pulmonary     disease and recurrent bronchitis, history of atrial fibrillation here for follow    up today.             Since his last office visit he was supposed to be on tudorza and advair and has     been on this for a long time but started having shortness of breath and cough     which has been worsening for the last 1.5 months. He also has wheezing at times.    He is not able to walk for long distances. He is getting significantly worse     with lack of energy and not being able to move around much. He does use inhalers    religiously but his advair was not covered by his insurance so his primary care     provider switched it to breo but he does not like it as much. He continues to     take flonase, Lasix and Flomax.  He has a little bit of chest discomfort but has    no fever or hemoptysis, no significant changes in his weight.            ROS      Constitutional:  Complains of: Fatigue; Denies: Fever, Weight gain, Weight  loss,    Chills, Insomnia, Other      Respiratory/Breathing:  Complains of: Shortness of air, Wheezing, Cough; Denies:    Hemoptysis, Pleuritic pain, Other      Endocrine:  Denies: Polydipsia, Polyuria, Heat/cold intolerance, Diabetes, Other      Eyes:  Denies: Blurred vision, Vision Changes, Other      Ears, nose, mouth, throat:  Denies: Congestion, Dysphagia, Hearing Changes, Nose    Bleeding, Nasal Discharge, Throat pain, Tinnitus, Other      Cardiovascular:  Denies: Chest Pain, Exertional dyspnea, Peripheral Edema,     Palpitations, Syncope, Wake up Gasping for air, Orthopnea, Tachycardia, Other      Gastrointestinal:  Denies: Abdominal pain/cramping, Bloody stools, Constipation,    Diarrhea, Melena, Nausea, Vomiting, Other      Genitourinary:  Denies: Dysuria, Urinary frequency, Incontinence, Hematuria,     Urgency, Other      Musculoskeletal:  Denies: Joint Pain, Joint Stiffness, Joint Swelling, Myalgias,    Other      Hematologic/lymphatic:  DENIES: Lymphadenopathy, Bruising, Bleeding tendencies,     Other      Neurologic:  Denies: Headache, Numbness, Weakness, Seizures, Other      Psychiatric:  Denies: Anxiety, Appropriate Effect, Depression, Other      Sleep:  Yes: Insomnia?; No: Excessive daytime sleep, Morning Headache?, Snoring,    Stop breathing at sleep?, Other      Integumentary:  Denies: Rash, Dry skin, Skin Warm to Touch, Other            FAMILY/SOCIAL/MEDICAL HX      Current History      Lives alone. Never smoked. No alcohol. No illicit drugs use.       Used to work as a farmer.      No pets or birds at home.      Surgical History:  Yes: Cholecystectomy, Eye Surgery, Head Surgery (BILATERAL     CATARACTS REMOVED), Hernia Surgery, Orthopedic Surgery (RIGHT ELBOW BURSECTOMY);    No: AAA Repair, Abdominal Surgery, Angioplasty, Appendectomy, Back Surgery,     Bladder Surgery, Bowel Surgery, Breast Surgery, CABG, Carotid Stenosis, Ear     Surgery, Kidney Surgery, Nose Surgery, Oral Surgery,  Prostatectomy, Rectal     Surgery, Spinal Surgery, Testicular Surgery, Throat Surgery, Valve Replacement,     Vascular Surgery, Other Surgeries      Diabetes - Family Hx:  Child      Is Father Still Living?:  No      Is Mother Still Living?:  No       Family History:  Yes      Social History:  No Tobacco Use, No Alcohol Use, No Recreational Drug use      Smoking status:  Never smoker      Occupation:  farmer      Anticoagulation Therapy:  No      Antibiotic Prophylaxis:  No      Medical History:  Yes: Arthritis (GENERALIZED), Chemotherapy/Cancer (SKIN CANCER    REMOVED EAR AND BACK AREA), Chronic Bronchitis/COPD, Hemorrhoids/Rectal Prob     (ACID REFLUX), Hiatal Hernia, High Blood Pressure, Shortness Of Breath (HX OF     EMPHYSEMA, BRONCHITIS), Miscellaneous Medical/oth (RESTLESS LEG SYNDROME); No:     Alcoholism, Allergies, Anemia, Asthma, Blood Disease, Broken Bones, Cataracts,     Chemical Dependency, Emphysema, Chronic Liver Disease, Colon Trouble, Colitis,     Diverticulitis, Congestive Heart Failu, Deafness or Ringing Ears, Convulsions,     Depression, Anxiety, Bipolar Disorder, Diabetes, Epilepsy, Seizures,     Forgetfullness, Glaucoma, Gall Stones, Gout, Head Injury, Heart Attack, Heart     Murmur, Hepatitis, High Cholesterol, HIV (Do not ask - volu, Jaundice, Kidney or    Bladder Disease, Kidney Stones, Migrane Headaches, Mitral Valve Prolapse, Night     sweats, Phlebitis, Psychiatric Care, Reflux Disease, Rheumatic Fever, Sexually     Transmitted Dis, Sinus Trouble, Skin Disease/Psoriais/Ecz, Stroke, Thyroid     Problem, Tuberculosis or Pos TB Te      Psychiatric History      none            PREVENTION      Hx Influenza Vaccination:  Yes      Date Influenza Vaccine Given:  Oct 1, 2019      Influenza Vaccine Declined:  No      2 or More Falls Past Year?:  No      Fall Past Year with Injury?:  No      Hx Pneumococcal Vaccination:  Yes      Encouraged to follow-up with:  PCP regarding preventative exams.       Chart initiated by      Dionna Mahan [facility]            ALLERGIES/MEDICATIONS      Allergies:        Coded Allergies:             LEVOFLOXACIN (Verified  Allergy, Unknown, NAUSEA/VOMITING, 12/9/19)                  PT. STATED HE CAN TAKE IT IV THOUGH, NO REACTION      Uncoded Allergies:             IV DYE (Allergy, Unknown, EYES SWOLLEN FOREHEAD RASH, 3/2/18)      Medications    Last Reconciled on 6/16/20 12:07 by BLU DELGADO MD      Budesonide/Formoterol Fumarate (Symbicort 160/4.5 Mcg) 10.2 Gm Inh      2 PUFF INH BID, #1 INH 5 Refills         Prov: Blu Delgado         6/16/20       Omeprazole (Omeprazole*) 40 Mg Capsule      40 MG PO HS, #30 CAP 0 Refills         Reported         6/16/20       Aclidinium Bromide (Tudorza) 400 Mcg Inh      1 PUFF INH BID, #1 MDI 6 Refills         Prov: Blu Delgado         12/9/19       MDI-Advair 250/50 (Advair 250/50 Diskus) 1 Each Blst.w.dev      1 PUFF INH BID for 90 Days, #3 INH 4 Refills         Prov: Blu Delgado         12/9/19       Nathan-Fluticasone (Fluticasone 50 mcg) 16 Gm Spray.susp      2 PUFFS NARE EACH QDAY, #1 BOTTLE 6 Refills         Prov: Blu Delgado         12/9/19       MDI-Albuterol (Ventolin HFA) 8 Gm Hfa.aer.ad      2 PUFFS INH Q4-6H PRN for DYSPNEA, #3 INH 3 Refills         Prov: Blu Delgado         12/9/19       amLODIPine (amLODIPine) 5 Mg Tablet      5 MG PO BID, #60 TAB         Reported         12/9/19       Finasteride (Finasteride*) 5 Mg Tablet      5 MG PO QDAY, #30 TAB 0 Refills         Reported         7/18/19       (B12)   No Conflict Check      2500 UNITS PO QDAY         Reported         3/2/18       (Eye Vitamin)   No Conflict Check      QDAY         Reported         3/2/18       Meclizine Hcl (Meclizine*) 25 Mg Tablet      25 MG PO BID, #60 TAB 0 Refills         Reported         7/1/15       DULoxetine (Cymbalta) 60 Mg Capsule      60 MG PO QDAY, #30 CAP 0 Refills         Reported         11/3/14       Multivitamins (Multi-Day Vitamin) 1 Tab  Tablet      1 TAB PO QDAY, TAB         Reported         11/7/13       Tamsulosin HCL (Flomax) 0.4 Mg Cap.sr.24h      0.4 MG PO BID, CAP         Reported         11/7/13       Cholecalciferol (Vitamin D3) (Vitamin D3) 1,000 Unit Tab      2000 UNITS PO QDAY, TAB         Reported         11/4/13       Gabapentin (Gabapentin) 300 Mg Capsule      300 MG PO QDAY         Reported         1/16/12      Current Medications      Current Medications Reviewed 6/16/20            EXAM      CONSTITUTIONAL: Pleasant elderly male in mild  distress.  Normal conversant.        EYES : Pink conjunctive, no ptosis, PERRL       ENMT : Dentures are present, Mallampati classification II, mild posterior     pharyngeal wall erythema, no sinus tenderness.   Nose and ears appear normal.      There is thrush present.        Neck: Nontender, no masses, no thyromegaly, no nodules      Resp : Diminished breath sounds, scattered rhonchi more on the left base but     bilateral basal crackles were heard, no wheezing, resonant to percussion     bilaterally.        CVS  : No carotid bruits, s1s2 nl, RRR, no murmur, rubs or gallop, 1+ pedal     edema bilateral lower extremities.       Chest wall: Normal rise with inspiration, nontender on palpation      GI   : Abdomen soft, with no masses, no hepatosplenomegaly, no hernias, BS+      MSK  : Normal gait and station, no digital cyanosis or clubbing       Skin : No rashes, ulcerations or lesions, normal turgor and temperature      Neuro: CN II - XII intact, no sensory deficits, DTRs intact and symmetrical, no     motor weakness      Psych: Appropriate affect, A   Vitals      Vitals:             Height 5 ft 9.00 in / 175.26 cm           Weight 164 lbs  / 74.797456 kg           BSA 1.90 m2           BMI 24.2 kg/m2           Temperature 97.7 F / 36.5 C - Temporal           Pulse 78           Respirations 14           Blood Pressure 150/90 Sitting, Left Arm           Pulse Oximetry 98%, room air            REVIEW       Results Reviewed      PCCS Results Reviewed?:  Yes Prev Lab Results, Yes Prev Radiology Results, Yes     [facility]      [facility]adi[facility] Results               [facility] Diagnostic Img                PACS RADIOLOGY REPORT            Patient: MARCIO HERNANDEZ   Acct: #[id number]   Report: #[id number]            UNIT #: [id number]    DOS: 20 1345      INSURANCE:MEDICARE PART A   LOCATION:[facility]     : 1932            PROVIDERS      ADMITTING:     ATTENDING: Blu Delgado      FAMILY:  NONE,MD   ORDERING:  Blu Delgado         OTHER: CHEYENNE SMITH   DICTATING:  MAJOR CAMERON MD            REQ #:[id number]   EXAM:CHWO - CT CHEST without CONTRAST      REASON FOR EXAM:        REASON FOR VISIT:  COUGH            *******Signed******         PROCEDURE:   CT CHEST WITHOUT CONTRAST             COMPARISON:   None.             INDICATIONS:   PATIENT STATES POSS PNEUMONIA, SOA             TECHNIQUE:   CT images were created without the administration of contrast     material.               PROTOCOL:     Standard imaging protocol performed                RADIATION:     DLP: 447.3mGy*cm          Automated exposure control was utilized to minimize radiation dose.              FINDINGS:         Patchy bilateral pulmonary nodules, some of which have tree-in-bud     configuration.  An index nodule       in the posterior left upper lobe measures 7 mm (image 26).  No dense cons    olidation.  Linear       opacities in the lung bases, likely atelectasis or scarring.  Small bilateral     pleural effusions.        Cardiomegaly.  Coronary artery calcification.  No adenopathy in the chest.             Small hiatal hernia.  No acute findings in the included upper abdomen.  No     aggressive appearing       bone change.             CONCLUSION:   Patchy bilateral pulmonary nodules, favored to represent infecti    ous or inflammatory       change.  Recommend a follow-up chest  CT in 3-6 months.             No dense consolidation.             Small bilateral pleural effusions.              MAJOR CAMERON MD             Electronically Signed and Approved By: MAJOR CAMERON MD on 2020 at 13:01                        Until signed, this is an unconfirmed preliminary report that may contain      errors and is subject to change.                                              JOVANER:      D:20 1301      PFT Results      Patient: WESLEY CUNNINGHAM   Acct: #[id number]   Report: #[id number]            UNIT #: [id number]    DOS:       LOCATION:[facility]     : 1932            PROVIDERS      ADMITTING:     FAMILY:  MD NONE         OTHER:       DICTATING:  Blu Delgado MD            REASON FOR VISIT:  COPD            *******Signed******                                    [facility]                          [employer]                            [place], [place]  [address]               __________________________________________________________________________             Patient Name:                   Attending Physician:      Wesley Cunningham M.D.             Patient Visit # MR #            Admit Date  Disch Date     Location      [id number]    [id number]      2019                 [facility]- -             Date of Birth      1932      __________________________________________________________________________      821 - DIAGNOSTIC REPORT             PULMONARY FUNCTION TEST             SPIROMETRY:      Spirometry shows moderate obstructive defect.      FEV1/FVC ratio is 51%.      FEV1 is 1.62 L, 66% of predicted.      FVC is 3.16 L, 89% of predicted.             BRONCHODILATOR RESPONSE:      There is no significant response to bronchodilator administration.             LUNG VOLUMES:      Lung volumes show hyperinflation and air trapping.      Total lung capacity is 9.43 L, 137% of predicted.      Residual volume  is 6.26 L, 227% of predicted.             DIFFUSION:      Diffusion capacity is normal, 99% of predicted.             FLOW VOLUME LOOP:      Flow volume loop is suggestive of obstructive process.             CONCLUSION:      Low FEV1/FVC with low FEV1, normal FVC with air trapping and hyperinflation      and normal diffusion capacity.  It suggest moderate obstructive airway      disease, likely chronic bronchitis phenotype.  Please correlate clinically.             To be electronically signed in [place]      [id number] BLU DELGADO M.D.             NK:aw      D:  07/02/2019 17:44      T:  07/02/2019 18:21      #[id number]             Until signed, this is an unconfirmed preliminary report that may contain      errors and is subject to change.                   Until signed, this is an unconfirmed preliminary report that may contain      errors and is subject to change.                     <Electronically signed by Blu Delgado MD>                07/08/19 1018               Blu Delgado MD:AMW      D:07/02/19 1744            Assessment      Cough - R05            TRUJILLO (dyspnea on exertion) - R06.09            Notes      New Medications      * Omeprazole (Omeprazole*) 40 MG CAPSULE: 40 MG PO HS #30      * Budesonide/Formoterol Fumarate (Symbicort 160/4.5 Mcg) 10.2 GM INH: 2 PUFF INH      BID #1      New Diagnostics      * Chest W/O Cont CT, 1 DAY         Dx: Cough - R05      * Comp Metabolic Panel, Month         Dx: TRUJILLO (dyspnea on exertion) - R06.09      * CBC, Month         Dx: Cough - R05      * Probrain Natriuretic, Routine         Dx: TRUJILLO (dyspnea on exertion) - R06.09      PLAN:        The patient is an 88 year old male with history of moderate COPD and recurrent     bronchitis. He has worsening cough, shortness of breath and lack of energy.              1. Possible pneumonia. I will check CT scan of the chest now. I will check CBC,     CMP and  NT-proBNP. Continue with tudorza, I will give him a sample of Symbicort     and try to switch breo to Symbicort twice daily. He may need antibiotics based     on his blood work and CT scan of the chest.       2. Follow up with him in 1 week, earlier if needed.            Patient Education      Education resources provided:  Yes      Patient Education Provided:  Acute Bronchitis            Electronically signed by Blu Delgado  07/08/2020 15:10       Disclaimer: Converted document may not contain table formatting or lab diagrams. Please see [employer] for the authenticated document.

## 2021-05-28 NOTE — PROGRESS NOTES
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      Start rifampin, azithro and ethambutol for mycobacterium mucogenicum.       Drug side effects and toxicity was reviewed in detail.       Check CBC, CMP in a month.       Diuresis per cardiology and primary care.       Continue eliquis.             Follow up in 5 to 6 weeks, earlier if needed.       History of Present Illness            Chief Complaint: 3 week f/u             Wesley Cunningham is presenting for evaluation via Telehealth visit. Verbal     consent obtained before beginning visit.            PAST MEDICAL HISTORY/OVERVIEW OF PATIENT SYMPTOMS            Symptoms: none            HX: Dyspnea, Solitary pulmonary nodule             Flu vacc- current            Pneumonia vacc- current            Provider spent 15 minutes with the patient during telehealth visit.            The following staff were present during this visit: Dr. Blu Delgado, Dionna Levine MA             The patient is an 88 year old male with a history of persistent pneumonia with     MRSA, pseudomonas and Klebsiella who was treated with doxycycline and Levaquin     in the past. He has currently grown AFB with mycobacterium nonchromogenicum and     continues to get worse with exertional dyspnea, hoarseness of voice and cough.      He feels like his symptoms are worse. I tried to get him to the hospital to     treat him with possible afib heart failure as well as chronic infection and     persistent pneumonia.  He decided not to get admitted to the hospital and     continues to feel worse. He is willing to try multiple antibiotics. He says he     has decreased appetite, feels weak and feels like he has decreased energy early     in the morning.  He has no chest pain or chest tightness, no nausea or vomiting,    no fever or chills.  He has no fever, chills, nausea or vomiting. He continues     to feel worse with cough and shortness of breath.            Physical exam deferred due to TeleHealth visit.              Patient: MARCIO CUNNINGHAM KATY   Acct: #[id number]   Report: #[id number]            UNIT #: [id number]    DOS:       LOCATION:[facility] [place]     : 1932            PROVIDERS      ADMITTING:     FAMILY:  MD NONE         OTHER:       DICTATING:  Blu Delgado MD            REASON FOR VISIT:  COPD            *******Signed******                                    [facility]                          [facility] [employer] [facility]                            [place], [place]  [address]               __________________________________________________________________________             Patient Name:                   Attending Physician:      Marcio Cunningham M.D.             Patient Visit # MR #            Admit Date  Disch Date     Location      [id number]    [id number]      2019                 [facility]- -             Date of Birth      1932      __________________________________________________________________________      821 - DIAGNOSTIC REPORT             PULMONARY FUNCTION TEST             SPIROMETRY:      Spirometry shows moderate obstructive defect.      FEV1/FVC ratio is 51%.      FEV1 is 1.62 L, 66% of predicted.      FVC is 3.16 L, 89% of predicted.             BRONCHODILATOR RESPONSE:      There is no significant response to bronchodilator administration.             LUNG VOLUMES:      Lung volumes show hyperinflation and air trapping.      Total  lung capacity is 9.43 L, 137% of predicted.      Residual volume is 6.26 L, 227% of predicted.             DIFFUSION:      Diffusion capacity is normal, 99% of predicted.             FLOW VOLUME LOOP:      Flow volume loop is suggestive of obstructive process.             CONCLUSION:      Low FEV1/FVC with low FEV1, normal FVC with air trapping and hyperinflation      and normal diffusion capacity.  It suggest moderate obstructive airway      disease, likely chronic bronchitis phenotype.  Please correlate clinically.             To be electronically signed in [employer]      [id number] ELIAS DELGADO M.D.             NK:aw      D:  2019 17:44      T:  2019 18:21      #[id number]             Until signed, this is an unconfirmed preliminary report that may contain      errors and is subject to change.                   Until signed, this is an unconfirmed preliminary report that may contain      errors and is subject to change.                     <Electronically signed by Elias Delgado MD>                19 1018               Elias Delgado MDA:[facility]      D:19 1744                             [facility]                PACS RADIOLOGY REPORT            Patient: MARCIO HERNANDEZ   Acct: #[id number]   Report: #[id number]            UNIT #: [id number]    DOS: 20 0843      INSURANCE:MEDICARE PART A   LOCATION:PET     : 1932            PROVIDERS      ADMITTING:     ATTENDING: NEIDA AKHTAR [facility]      FAMILY:  CHEYENNE SMITH   ORDERING:  NEIDA AKHTAR         OTHER: PITA LEWIS   DICTATING:  Hasmukh Owens MD            REQ #:[id number]   EXAM:[id number] - SKULL BASE-MIDTHIGH SUBSEQ fdg      REASON FOR EXAM:  R91.1      REASON FOR VISIT:  R91.1            *******Signed******         PROCEDURE:   PET CT SKULL BASE TO MID THIGH SUBSEQ             COMPARISON:   None.              INDICATIONS:   SPN             TECHNIQUE:   After obtaining the patient's consent, F-18 FDG was administered     intravenously.  PET/CT       imaging was performed from skull to thigh with multi-planar imaging without oral    or intravenous       contrast material, using a dedicated integrated PET/CT scanner.               RADIONUCLIDE:     11.98 MCI   F18 FDG- I.V.      LABS:                          Blood Glucose 94 mg/dl      UPTAKE TIME:        55 mins      BACKGROUND UPTAKE:  Liver 2.77, mediastinum 2.68             FINDINGS:         The evaluation of the head and neck soft tissues demonstrates no significant     focal abnormal       radiopharmaceutical accumulation to indicate malignancy or metastatic disease.             The evaluation of the thoracic soft tissues demonstrates evidence for mild     radiopharmaceutical       accumulation corresponding to pulmonary nodules at the lateral aspect of the     right upper lobe.  The       SUV maximum for this activity is 1.37 correlating to the nodule at the lateral     aspect of the right       upper lobe on image number 89 measuring 0.6 cm.  This activity does not meet     criteria for       malignancy.  There are additional nodules in this region with mild activity.      These findings are       likely inflammatory or related to granulomatous inflammatory changes.  Scattered    areas of       nodularity and scarring are seen throughout the upper lobes bilaterally.               The evaluation of the abdominal and pelvic soft tissues demonstrates evidence     for apparent       radiopharmaceutical accumulation corresponding to the cecum and proximal     ascending colon.  There is       ill-defined apparent soft tissue attenuation within the region the cecum and     possible bowel wall       thickening.  The findings may indicate the presence of a cecal or ascending co    hernandez mass.  The SUV       maximum at this location is 6.91.  The colon in this region may  also be obscured    by the presence of       stool and radiopharmaceutical activity within the fecal material.  The expected     physiologic       activity throughout the liver and spleen, GI tract, and collecting system is     noted.             The evaluation of the proximal appendicular and axial skeleton demonstrates no     significant focal       abnormal radiopharmaceutical accumulation to indicate osseous malignancy or     metastatic disease.       Likewise, the evaluation of the peripheral soft tissues demonstrates no     significant focal abnormal       radiopharmaceutical accumulation to indicate peripheral soft tissue malignancy     or metastatic       disease.              The review of the CT images demonstrates breathing motion throughout the lungs     with scattered       atelectasis.  There is atelectasis within the lower lobes.  Coronary artery     calcifications are       noted.  Postoperative changes are noted in the epigastric region.  Bilateral     renal cysts are       observed.  There is diverticulosis throughout the colon without signs of acute     diverticulitis.             CONCLUSION:         1. Mild radiopharmaceutical accumulation corresponding to the nodules at the     lateral aspect of the       right upper lobe.  There are additional small nodules in this region as well as     within the left       upper lobe without significant radiopharmaceutical accumulation.  Areas of     scarring are noted.  The       findings are most likely related to remote/chronic inflammation or granulomatous    inflammatory       changes.  Changes secondary to early developing malignant nodules are felt less     likely.  Recommend       follow-up CT of the chest in 3 months to ensure stability.      2. There is apparent focal radiopharmaceutical accumulation corresponding to the    cecum and       ascending colon.  There appears to be ill-defined abnormal possible soft tissue     attenuation       involving  the colon in this region suggesting a colonic mass.  The findings may     indicate malignancy       or adenocarcinoma of the colon.  Changes secondary to stool within the colon in     this region with       radiopharmaceutical accumulation within the fecal matter could potentially have     this appearance.        Recommend correlation with colonoscopy for better evaluation of the proximal     ascending colon.       3. No evidence for additional focal abnormal radiopharmaceutical accumulation to    indicate potential       additional malignancy or metastatic disease.      4. Incidental CT findings as indicated within the body of the report.              WESLEY EL MD             Electronically Signed and Approved By: WESLEY EL MD on 11/05/2020 at 11:42                                  Until signed, this is an unconfirmed preliminary report that may contain      errors and is subject to change.                                              HAZDA:      D:11/05/20 1142            Allergies and Medications      Allergies:        Coded Allergies:             MORPHINE (Verified  Allergy, Severe, confused, 1/4/21)           LEVOFLOXACIN (Verified  Allergy, Intermediate, nausea, 1/4/21)                  can take iv form      Uncoded Allergies:             IV DYE (Allergy, Unknown, EYES SWOLLEN FOREHEAD RASH, 3/2/18)      Medications    Last Reconciled on 1/4/21 16:50 by ELIAS DELGADO MD      Doxycycline Hyclate (Doxycycline Hyclate*) 100 Mg Capsule      100 MG PO BID, #14 CAP 0 Refills         Prov: Elias Delgado         1/4/21       Ciprofloxacin HCl (Ciprofloxacin HCl) 750 Mg Tablet      750 MG PO BID, #30 TAB 0 Refills         Prov: Elias Delgado         1/4/21       Ethambutol (Ethambutol) 400 Mg Tablet      800 MG PO MOWEFR for 30 Days, #24 TAB 5 Refills         Prov: Elias Delgado         1/4/21       rifAMPin (rifAMPin) 300 Mg Cap      600 MG PO MOWEFR for 30 Days, #12 CAP 5 Refills         Prov: Elias Delgado          1/4/21       Azithromycin (Azithromycin) 500 Mg Tablet      500 MG PO MOWEFR for 30 Days, #12 TAB 5 Refills         Prov: Blu Delgado         1/4/21       Sucralfate (Sucralfate) 1 Gm Tablet      1 GM PO QIDAC, TAB         Reported         1/4/21       Lactobacillus Rhamnosus  (Probiotic Digestive Care) 1 Each Capsule      1 EACH PO, CAP         Reported         11/24/20       Fluticasone (Flovent HFA) 44 Mcg Inhaler      2 PUFFS INH RTBID, INH         Reported         11/24/20       Aclidinium Bromide (Tudorza) 400 Mcg Inh      1 PUFF INH RTBID, #1 MDI 0 Refills         Reported         11/24/20       (Eye Vitamin )   No Conflict Check      PO HS         Reported         11/24/20       (Juvenon)   No Conflict Check      PO BID         Reported         11/24/20       Multivitamins (Multi-Vitamin) 1 Each Tablet      1 TAB PO QDAY, #30 TAB 0 Refills         Reported         11/24/20       Meloxicam (Meloxicam*) 15 Mg Tablet      15 MG PO QDAY, #30 TAB 0 Refills         Reported         11/24/20       Tamsulosin HCL (Tamsulosin HCL) 0.4 Mg Capsule      0.4 MG PO QDAY, #30 CAP 0 Refills         Reported         9/15/20       Metoprolol Tartrate (Metoprolol Tartrate) 25 Mg Tablet      12.5 MG PO BID, #60 TAB 0 Refills         Reported         9/15/20       Furosemide* (Lasix*) 20 Mg Tablet      20 MG PO QDAY, #30 TAB 0 Refills         Reported         9/15/20       Aspirin EC (Aspirin EC) 81 Mg Tablet.dr      81 MG PO QDAY, #30 TAB.EC 0 Refills         Reported         9/1/20       Coenzyme Q10 (Co Q-10) 100 Mg Capsule      100 MG PO QDAY for 30 Days, #30 CAP         Reported         9/1/20       Apixaban (Eliquis) 5 Mg Tablet      5 MG PO BID for 30 Days, #60 TAB         Reported         9/1/20       Lisinopril* (Lisinopril*) 5 Mg Tablet      2.5 MG PO QDAY, #30 TAB 0 Refills         Reported         9/1/20       Calcium Carb/Vit D3 (CALCIUM 600-VIT D3 400 TABLET) 1 Each Tablet      1 EACH PO QDAY, #60 TAB 0  Refills         Reported         8/21/20       Finasteride (Finasteride*) 5 Mg Tablet      5 MG PO HS, #30 TAB 0 Refills         Reported         8/21/20       Omeprazole (Omeprazole*) 20 Mg Tablet.dr      20 MG PO QDAY, #30 CAP 0 Refills         Reported         8/21/20       Venlafaxine HCl XR (Venlafaxine XR*) 150 Mg Tab.er.24      150 MG PO QDAY, CAP         Reported         8/21/20       Meclizine Hcl (Meclizine*) 25 Mg Tablet      25 MG PO BID, #60 TAB 0 Refills         Reported         8/21/20       (B12)   No Conflict Check      2500 UNITS PO QDAY         Reported         3/2/18       Cholecalciferol (Vitamin D3) (Vitamin D3) 1,000 Unit Tab      2000 UNITS PO QDAY, TAB         Reported         11/4/13            Plan      Orders:  Phone Eval 21-30 mi [id number]      Instructions      * Chronic conditions reviewed and taken in consideration for today's treatment       plan.      * Plan Of Care: ()      * Patient instructed to seek medical attention urgently for new or worsening       symptoms.      * Patient was educated/instructed on their diagnosis, treatment and medications       today.      * Recommend self monitoring. Instructions given.      * Recommend self quarantine for 14 days.      * Recommend self quarantine until without fever for 72 hours without using fever       reducing medications.      * Recommends over the counter medications for symptom management.            Electronically signed by Blu Delgado  01/20/2021 12:29       Disclaimer: Converted document may not contain table formatting or lab diagrams. Please see [employer] for the authenticated document.

## 2021-05-28 NOTE — PROGRESS NOTES
Patient: MARCIO HERNANDEZ     [id number]     Report: #[id number]  UNIT #: [id number]     : 1932    Encounter Date:2019  PRIMARY CARE: CHEYENNE SMITH  ***Signed***  --------------------------------------------------------------------------------------------------------------------  Chief Complaint      Encounter Date      Mar 26, 2019            Primary Care Provider      MICHAEL URIOSTEGUI            Referring Provider      MICHAEL URIOSTEGUI            Patient Complaint      Patient is complaining of      4 month follow up/copd            VITALS      Height 5 ft 9 in / 175.26 cm      Weight 157 lbs 0 oz / 71.359589 kg      BSA 1.86 m2      BMI 23.2 kg/m2      Temperature 98.4 F / 36.89 C - Oral      Pulse 69      Respirations 16      Blood Pressure 144/78 Sitting, Right Arm      Pulse Oximetry 97%, room air      Initial Exhaled Nitrous Oxide      Exhaled Nitrous Oxide Results:  9            HPI      The patient is an 87 year old male with a history of moderate COPD,     gastroesophageal reflux disease, postnasal drip, dry mouth and sore throat who     is here for follow up.  He is currently taking Tudorza twice a day, Advair twice    a day and albuterol as needed. He has not needed any rescue inhaler over the la    st three months.  He does not have any nausea or vomiting, but has runny nose     and dry eyes. He has lots of postnasal drip. He has no cough or chest pain. He     feels like his shortness of breath is getting somewhat worse. He has taken his     flu shot this last office visit.            ROS      Constitutional:  Complains of: Fatigue; Denies: Fever, Weight gain, Weight loss,    Chills, Insomnia, Other      Respiratory/Breathing:  Complains of: Shortness of air; Denies: Wheezing, Cough,    Hemoptysis, Pleuritic pain, Other      Endocrine:  Denies: Polydipsia, Polyuria, Heat/cold intolerance, Diabetes, Other      Eyes:  Denies: Blurred vision, Vision Changes, Other      Ears, nose, mouth,  throat:  Denies: Mouth lesions, Thrush, Throat pain,     Hoarseness, Allergies/Hay Fever, Post Nasal Drip, Headaches, Recent Head Injury,    Nose Bleeding, Neck Stiffness, Thyroid Mass, Hearing Loss, Ear Fullness, Dry     Mouth, Nasal or Sinus Pain, Dry Lips, Nasal discharge, Nasal congestion, Other      Cardiovascular:  Denies: Palpitations, Syncope, Claudication, Chest Pain, Wake     up Gasping for air, Leg Swelling, Irregular Heart Rate, Cyanosis, Dyspnea on     Exertion, Other      Gastrointestinal:  Denies: Nausea, Constipation, Diarrhea, Abdominal pain,     Vomiting, Difficulty Swallowing, Reflux/Heartburn, Dysphagia, Jaundice,     Bloating, Melena, Bloody stools, Other      Genitourinary:  Denies: Urinary frequency, Incontinence, Hematuria, Urgency,     Nocturia, Dysuria, Testicular problems, Other      Musculoskeletal:  Denies: Joint Pain, Joint Stiffness, Joint Swelling, Myalgias,    Other      Hematologic/lymphatic:  DENIES: Lymphadenopathy, Bruising, Bleeding tendencies,     Other      Neurological:  Denies: Headache, Numbness, Weakness, Seizures, Other      Psychiatric:  Denies: Anxiety, Appropriate Effect, Depression, Other      Sleep:  No: Excessive daytime sleep, Morning Headache?, Snoring, Insomnia?, Stop    breathing at sleep?, Other      Integumentary:  Denies: Rash, Dry skin, Skin Warm to Touch, Other      Immunologic/Allergic:  Denies: Latex allergy, Seasonal allergies, Asthma,     Urticaria, Eczema, Other      Immunization status:  No: Up to date            FAMILY/SOCIAL/MEDICAL HX      Current History      Lives alone. Never smoked. No alcohol. No illicit drugs use.       Used to work as a farmer.      No pets or birds at home.      Surgical History:  Yes: Cholecystectomy, Eye Surgery, Head Surgery (BILATERAL     CATARACTS REMOVED), Hernia Surgery, Orthopedic Surgery (RIGHT ELBOW BURSECTOMY);    No: AAA Repair, Abdominal Surgery, Angioplasty, Appendectomy, Back Surgery,     Bladder Surgery,  Bowel Surgery, Breast Surgery, CABG, Carotid Stenosis, Ear     Surgery, Kidney Surgery, Nose Surgery, Oral Surgery, Prostatectomy, Rectal     Surgery, Spinal Surgery, Testicular Surgery, Throat Surgery, Valve Replacement,     Vascular Surgery, Other Surgeries      Diabetes - Family Hx:  Child      Is Father Still Living?:  No      Is Mother Still Living?:  No       Family History:  Yes      Social History:  No Tobacco Use, No Alcohol Use, No Recreational Drug use      Smoking status:  Never smoker      Occupation:  farmer      Anticoagulation Therapy:  No      Antibiotic Prophylaxis:  No      Medical History:  Yes: Arthritis (GENERALIZED), Chemotherapy/Cancer (SKIN CANCER    REMOVED EAR AND BACK AREA), Chronic Bronchitis/COPD, Hemorrhoids/Rectal Prob     (ACID REFLUX), Hiatal Hernia, High Blood Pressure, Shortness Of Breath (HX OF     EMPHYSEMA, BRONCHITIS), Miscellaneous Medical/oth (RESTLESS LEG SYNDROME); No:     Alcoholism, Allergies, Anemia, Asthma, Blood Disease, Broken Bones, Cataracts,     Chemical Dependency, Emphysema, Chronic Liver Disease, Colon Trouble, Colitis,     Diverticulitis, Congestive Heart Failu, Deafness or Ringing Ears, Convulsions,     Depression, Anxiety, Bipolar Disorder, Diabetes, Epilepsy, Seizures, Forget    fullness, Glaucoma, Gall Stones, Gout, Head Injury, Heart Attack, Heart Murmur,     Hepatitis, High Cholesterol, HIV (Do not ask - volu, Jaundice, Kidney or Bladder    Disease, Kidney Stones, Migrane Headaches, Mitral Valve Prolapse, Night sweats,     Phlebitis, Psychiatric Care, Reflux Disease, Rheumatic Fever, Sexually     Transmitted Dis, Sinus Trouble, Skin Disease/Psoriais/Ecz, Stroke, Thyroid     Problem, Tuberculosis or Pos TB Te      Psychiatric History      none            PREVENTION      Hx Influenza Vaccination:  Yes      Date Influenza Vaccine Given:  Oct 1, 2018      Influenza Vaccine Declined:  No      2 or More Falls Past Year?:  No      Fall Past Year with Injury?:   No      Hx Pneumococcal Vaccination:  Yes      Encouraged to follow-up with:  PCP regarding preventative exams.      Chart initiated by      laura hickman ma            ALLERGIES/MEDICATIONS      Allergies:        Coded Allergies:             LEVOFLOXACIN (Verified  Allergy, Unknown, NAUSEA/VOMITING, 3/26/19)                  PT. STATED HE CAN TAKE IT IV THOUGH, NO REACTION      Uncoded Allergies:             IV DYE (Allergy, Unknown, EYES SWOLLEN FOREHEAD RASH, 3/2/18)      Medications    Last Reconciled on 3/26/19 13:56 by BLU DELGADO MD      Aclidinium Bromide (Tudorza) 400 Mcg Inh               Prov: Blu Delgado         3/26/19       MDI-Albuterol (Ventolin HFA) 8 Gm Hfa.aer.ad      2 PUFFS INH Q4-6H PRN for DYSPNEA, #3 INH 3 Refills         Prov: Blu Delgado         11/8/18       Aclidinium Bromide (Tudorza) 400 Mcg Inh      1 PUFF INH RTBID, #1 MDI 0 Refills         Reported         6/21/18       (B12)   No Conflict Check      2500 UNITS PO QDAY         Reported         3/2/18       (Eye Vitamin)   No Conflict Check      QDAY         Reported         3/2/18       Lansoprazole (Prevacid) 15 Mg Capsule.dr      15 MG PO QDAY, CAP         Reported         12/7/17       Meclizine Hcl (Meclizine*) 25 Mg Tablet      25 MG PO BID, #60 TAB 0 Refills         Reported         7/1/15       DULoxetine (Cymbalta) 60 Mg Capsule      60 MG PO QDAY, #30 CAP 0 Refills         Reported         11/3/14       Multivitamins (Multi-Day Vitamin) 1 Tab Tablet      1 TAB PO QDAY, TAB         Reported         11/7/13       Tamsulosin HCL (Flomax) 0.4 Mg Cap.sr.24h      0.4 MG PO HS, CAP         Reported         11/7/13       Cholecalciferol (Vitamin D3*) 1,000 Unit Tab      2000 UNITS PO QDAY, TAB         Reported         11/4/13       Gabapentin (Gabapentin) 300 Mg Capsule      300 MG PO BID         Reported         1/16/12      Current Medications      Current Medications Reviewed 3/26/19            EXAM      CONSTITUTIONAL:  Pleasant elderly male in no acute distress.  Normal conversant.           EYES : Pink conjunctive, no ptosis, PERRL       ENMT : Dentures are present, Mallampati classification II, mild posterior     pharyngeal wall erythema, no sinus tenderness.   Nose and ears appear normal.      There is thrush present.        Neck: Nontender, no masses, no thyromegaly, no nodules      Resp : Diminished breath sounds bilaterally, resonant to percussion bilaterally,    no wheezing, rhonchi or crackles.        CVS  : No carotid bruits, s1s2 nl, RRR, no murmur, rubs or gallop, no peripheral    edema       Chest wall: Normal rise with inspiration, nontender on palpation      GI   : Abdomen soft, with no masses, no hepatosplenomegaly, no hernias, BS+      MSK  : Normal gait and station, no digital cyanosis or clubbing       Skin : No rashes, ulcerations or lesions, normal turgor and temperature      Neuro: CN II - XII intact, no sensory deficits, DTRs intact and symmetrical, no     motor weakness      Psych: Appropriate affect, A   Vtials      Vitals:             Height 5 ft 9 in / 175.26 cm           Weight 157 lbs 0 oz / 71.636246 kg           BSA 1.86 m2           BMI 23.2 kg/m2           Temperature 98.4 F / 36.89 C - Oral           Pulse 69           Respirations 16           Blood Pressure 144/78 Sitting, Right Arm           Pulse Oximetry 97%, room air            REVIEW      Results Reviewed      PCCS Results Reviewed?:  Yes Prev Lab Results, Yes Prev Radiology Results, Yes     Previous Mecial Records      Lab Results      The patient's blood work showed normal CBC and coags.      Radiographic Results               [facility]                PACS RADIOLOGY REPORT            Patient: MARCIO HERNANDEZ   Acct: #[id number]   Report: #[id number]            UNIT #: [id number]    DOS: 18 1143      INSURANCE:MEDICARE PART A   LOCATION:[facility]     : 1932             PROVIDERS      ADMITTING:     ATTENDING: RAYA Orantes      FAMILY:  MICHAEL URIOSTEGUI   ORDERING:  RAYA Orantes         OTHER:    DICTATING:  Garrett Munoz MD            REQ #:[id number]   EXAM:CXR2 - CHEST 2V AP PA LAT      REASON FOR EXAM:  HOARSNESS, COPD, GERD      REASON FOR VISIT:  HOARSNESS, COPD, GERD            *******Signed******         PROCEDURE:   CHEST AP/PA AND LATERAL             COMPARISON:   [facility], , CHEST AP/PA 1 VIEW, 2013,     22:53.             INDICATIONS:   HORSENESS, COPD, GERD. PRE-OPERATIVE FOR QUAD SCOPE ON FRIDAY             FINDINGS:         Borderline heart size.  Mediastinal structures appear stable.  Thoracic aortic     ectasia.  There are       coarse bibasilar interstitial opacities.  No superimposed acute airspace disease    visualized.  Soft       tissues and skeleton intact.Endplate degenerative arthropathy of the thoracic     spine and multilevel       disc space narrowing.  Osteopenia              CONCLUSION:   No acute disease.  No significant change has occurred.                Garrett Munoz M.D.             Electronically Signed and Approved By: Garrett Munoz M.D. on 2018 at     12:02                        Until signed, this is an unconfirmed preliminary report that may contain      errors and is subject to change.                                              RAMSR:      D:18 1202      PFT Results      [facility] MARCIO HERNANDEZ   [id number]         Report #: [id number]   : 1932      MR #: [id number]   Location: [facility]                                ***Signed***            PFT      DATE: 12/15/14      PFT Results      Pulmonary function test interpretation:            Spirometry shows moderate obstructive defect ( FEV1/ FVC ratio- 54, FEV1- 1.66     lits, 63% of predicted; FVC- 3.07 lits, 82% of predicted)            There is no response to bronchodilator demonstrated.            Lung volumes show airtrapping. (  % of predicted, 8.09 liters; RV 4.50     liters, 169% of predicted)            Flow volume loop is suggestive of obstructive process.            Diffusion capacity is normal. (80% of predicted)            Comparison: No previous tests to compare.            Conclusion: Low FEV1/FVC with 63% predicted of FEV1, air trapping and normal     diffusion capacity is suggestive of moderate chronic obstructive airway disease,    likely chronic bronchitis. Please correlate clinically.            Blu Delgado MD                 Dec 15, 2014 14:28               <Electronically signed by Blu Delgado MD> on 12/15/14 1428            Assessment      COPD (chronic obstructive pulmonary disease)         Centrilobular emphysema - J43.2         Emphysema type: centrilobular            Notes      New Medications      * Aclidinium Bromide (Tudorza) 400 MCG INH          Sample - Qty 6         Instructions: 1 puff bid         Dx: COPD (chronic obstructive pulmonary disease) - J44.9      * Nathan-Fluticasone (Fluticasone 50 mcg) 16 GM SPRAY.SUSP: 2 PUFFS NARE EACH QDAY       #1      Discontinued Medications      * MDI-Advair 250/50 (Advair 250/50 Diskus) 1 EACH BLST.W.DEV: 1 PUFF INH RTBID       #1      * TIOTROPIUM BROMIDE (Spiriva Respimat 2.5 mcg/Puff) 4 GM MIST.INHAL: 2 PUFFS       INH RTQDAY #1      * UMECLIDINIUM BROMIDE (Incruse Ellipta) 62.5 MCG BLST.W.DEV: 1 PUFF INH RTQDAY       #1      * MDI-Advair 250/50 (Advair 250/50 Diskus) 1 EACH BLST.W.DEV: 1 PUFF INH RTBID       90 Days #3      New Diagnostics      * PFT-Comp, PrePost,DLCO,BodyBox, Week         Dx: COPD (chronic obstructive pulmonary disease) - J44.9      * 6 Min Walk With Pulse Ox, Routine         Dx: COPD (chronic obstructive pulmonary disease) - J44.9      PLAN:      The patient is an 87 year old male with history history of moderate chronic obst    ructive pulmonary disease, gastrointestinal esophageal reflux disease, postnasal    drip, dry mouth and sore throat.               1. Chronic obstructive pulmonary disease.  Continue with Tudorza bid, Advair     twice a day and albuterol as needed.  Samples of Tudorza was given to him today.                 2. Hiatal hernia, nausea and vomiting.  Follow up with gastroenterology, plan     per them. Keep the head of bed up with sleep and not eat close to bedtime.  He     does not smoke anymore.  I will repeat pulmonary function test and six minute     walk test with worsening exertional dyspnea.            3.  Follow up in 3-4 months, earlier if needed.            Patient Education      Education resources provided:  Yes      Patient Education Provided:  Acute Bronchitis            Patient Education:        Chronic Obstructive Pulmonary Disease                 Disclaimer: Converted document may not contain table formatting or lab diagrams. Please see [employer] [facility] for the authenticated document.

## 2021-05-28 NOTE — PROGRESS NOTES
Patient: MARCIO HERNANDEZ     [id number]     Report: #[id number]  UNIT #: [id number]     : 1932    Encounter Date:2018  PRIMARY CARE: CHEYENNE SMITH  ***Signed***  --------------------------------------------------------------------------------------------------------------------  Chief Complaint      Encounter Date      2018            Primary Care Provider      MICHAEL URIOSTEGUI            Referring Provider      MICHAEL URIOSTEGUI            Patient Complaint      Patient is complaining of      6 month follow up            VITALS      Height 5 ft 9 in / 175.26 cm      Weight 153 lbs 2 oz / 69.375051 kg      BSA 1.84 m2      BMI 22.6 kg/m2      Temperature 98.2 F / 36.78 C - Oral      Pulse 60      Respirations 16      Blood Pressure 138/90 Sitting, Right Arm      Pulse Oximetry 100%, room air      Exhaled Nitrous Oxide Testin            HPI      The patient is a 86 year old male with history of moderate chronic obstructive     pulmonary disease, voice hoarseness, gastrointestinal esophageal reflux disease    , cigarettes smoking in the past, postnasal drip, dry mouth and mouth and     tongue soreness. He is here for follow up.             Since his last office visit his Advair was switched to Symbicort and Spiriva     was switched to Tudorza given the cost issue as well as having hoarseness and     dryness. He tried Tudorza but the cost of it was more than $400 a month. Advair     was expensive and he is wondering if he could go back to Spiriva. He has no     fevers or chills, no nausea and vomiting. He has significant vomiting early in     the morning, he has stomach aches and diarrhea in the morning. He has history     of hiatal hernia and had surgery done by Dr. Hennessy in [place]. He is going     to see a gastrointestinal surgeon next week. He wants to have his medication     changed. He does not have decreased appetite, no fevers or chills, no lumps or     bumps anywhere. He still  has dryness of his mouth.            ROS      Constitutional:  Complains of: Fatigue, Denies: Fever, Weight gain, Weight loss    , Chills, Insomnia, Other      Respiratory/Breathing:  Denies: Shortness of air, Wheezing, Cough, Hemoptysis,     Pleuritic pain, Other      Endocrine:  Denies: Polydipsia, Polyuria, Heat/cold intolerance, Diabetes, Other      Eyes:  Denies: Blurred vision, Vision Changes, Other      Ears, nose, mouth, throat:  Denies: Mouth lesions, Thrush, Throat pain,     Hoarseness, Allergies/Hay Fever, Post Nasal Drip, Headaches, Recent Head Injury    , Nose Bleeding, Neck Stiffness, Thyroid Mass, Hearing Loss, Ear Fullness, Dry     Mouth, Nasal or Sinus Pain, Dry Lips, Nasal discharge, Nasal congestion, Other      Cardiovascular:  Denies: Palpitations, Syncope, Claudication, Chest Pain, Wake     up Gasping for air, Leg Swelling, Irregular Heart Rate, Cyanosis, Dyspnea on     Exertion, Other      Gastrointestinal:  Complains of: Vomiting, Reflux/Heartburn, Denies: Nausea,     Constipation, Diarrhea, Abdominal pain, Difficulty Swallowing, Dysphagia,     Jaundice, Bloating, Melena, Bloody stools, Other      Genitourinary:  Denies: Urinary frequency, Incontinence, Hematuria, Urgency,     Nocturia, Dysuria, Testicular problems, Other      Musculoskeletal:  Denies: Joint Pain, Joint Stiffness, Joint Swelling, Myalgias    , Other      Hematologic/lymphatic:  DENIES: Lymphadenopathy, Bruising, Bleeding tendencies,     Other      Neurological:  Denies: Headache, Numbness, Weakness, Seizures, Other      Psychiatric:  Denies: Anxiety, Appropriate Effect, Depression, Other      Sleep:  No: Excessive daytime sleep, Morning Headache?, Snoring, Insomnia?,     Stop breathing at sleep?, Other      Integumentary:  Denies: Rash, Dry skin, Skin Warm to Touch, Other      Immunologic/Allergic:  Denies: Latex allergy, Seasonal allergies, Asthma,     Urticaria, Eczema, Other      Immunization status:  No: Up to date             FAMILY/SOCIAL/MEDICAL HX      Current History      Lives alone. Never smoked. No alcohol. No illicit drugs use.       Used to work as a farmer.      No pets or birds at home.      Surgical History:  Yes: Cholecystectomy, Eye Surgery, Head Surgery (BILATERAL     CATARACTS REMOVED), Hernia Surgery, Orthopedic Surgery (RIGHT ELBOW BURSECTOMY)    , No: AAA Repair, Abdominal Surgery, Angioplasty, Appendectomy, Back Surgery,     Bladder Surgery, Bowel Surgery, Breast Surgery, CABG, Carotid Stenosis, Ear     Surgery, Kidney Surgery, Nose Surgery, Oral Surgery, Prostatectomy, Rectal     Surgery, Spinal Surgery, Testicular Surgery, Throat Surgery, Valve Replacement,     Vascular Surgery, Other Surgeries      Diabetes - Family Hx:  Child      Is Father Still Living?:  No      Is Mother Still Living?:  No       Family History:  Yes      Social History:  No Tobacco Use, No Alcohol Use, No Recreational Drug use      Smoking status:  Never smoker      Occupation:  farmer      Anticoagulation Therapy:  No      Antibiotic Prophylaxis:  No      Medical History:  Yes: Arthritis (GENERALIZED), Chemotherapy/Cancer (SKIN     CANCER REMOVED EAR AND BACK AREA), Chronic Bronchitis/COPD, Hemorrhoids/Rectal     Prob (ACID REFLUX), Hiatal Hernia, High Blood Pressure, Shortness Of Breath (HX     OF EMPHYSEMA, BRONCHITIS), Miscellaneous Medical/oth (RESTLESS LEG SYNDROME), No    : Alcoholism, Allergies, Anemia, Asthma, Blood Disease, Broken Bones, Cataracts    , Chemical Dependency, Emphysema, Chronic Liver Disease, Colon Trouble, Colitis    , Diverticulitis, Congestive Heart Failu, Deafness or Ringing Ears, Convulsions    , Depression, Anxiety, Bipolar Disorder, Diabetes, Epilepsy, Seizures,     Forgetfullness, Glaucoma, Gall Stones, Gout, Head Injury, Heart Attack, Heart     Murmur, Hepatitis, High Cholesterol, HIV (Do not ask - volu, Jaundice, Kidney     or Bladder Disease, Kidney Stones, Migrane Headaches, Mitral Valve Prolapse,      Night sweats, Phlebitis, Psychiatric Care, Reflux Disease, Rheumatic Fever,     Sexually Transmitted Dis, Sinus Trouble, Skin Disease/Psoriais/Ecz, Stroke,     Thyroid Problem, Tuberculosis or Pos TB Te      Psychiatric History      none            PREVENTION      Hx Influenza Vaccination:  Yes      Date Influenza Vaccine Given:  Oct 1, 2017      Influenza Vaccine Declined:  No      2 or More Falls Past Year?:  No      Fall Past Year with Injury?:  No      Hx Pneumococcal Vaccination:  Yes      Encouraged to follow-up with:  PCP regarding preventative exams.      Chart initiated by      laura hickman ma            ALLERGIES/MEDICATIONS      Allergies:        Coded Allergies:             LEVOFLOXACIN (Verified  Allergy, Unknown, NAUSEA/VOMITING, 6/21/18)       PT. STATED HE CAN TAKE IT IV THOUGH, NO REACTION      Uncoded Allergies:             IV DYE (Allergy, Unknown, EYES SWOLLEN FOREHEAD RASH, 3/2/18)      Medications    Last Reconciled on 3/9/18 10:57 by ROMAIN JETER PA-C      Aclidinium Bromide (Tudorza) 400 Mcg Inh      1 PUFF INH RTBID, #1 MDI 9 Refills         Prov: Blu Delgado         6/21/18       Aclidinium Bromide (Tudorza) 400 Mcg Inh      1 PUFF INH RTBID, #1 MDI 0 Refills         Reported         6/21/18       MDI-Advair 250/50 (Advair 250/50 Diskus) 1 Each Blst.w.dev      1 PUFF INH RTBID, #1 INH 3 Refills         Prov: Cindy Jeter PA-C         5/3/18       (B12)   No Conflict Check      2500 UNITS PO QDAY         Reported         3/2/18       (Eye Vitamin)   No Conflict Check      QDAY         Reported         3/2/18       Lansoprazole (Prevacid*) 15 Mg Capsule.      15 MG PO QDAY, CAP         Reported         12/7/17       Meclizine Hcl (Meclizine*) 25 Mg Tablet      25 MG PO BID, #60 TAB 0 Refills         Reported         7/1/15       DULoxetine (Cymbalta) 60 Mg Capsule      60 MG PO QDAY, #30 CAP 0 Refills         Reported         11/3/14       Multivitamins (Multi-Day Vitamin) 1 Tab  Tablet      1 TAB PO QDAY, TAB         Reported         11/7/13       Tamsulosin HCL (Flomax*) 0.4 Mg Cap.sr.24h      0.4 MG PO HS, CAP         Reported         11/7/13       Ubidecarenone (Co Q-10) 100 Mg Cap      200 MG PO QDAY, CAP         Reported         11/5/13       Cholecalciferol (Vitamin D3*) 1,000 Unit Tab      2000 UNITS PO QDAY, TAB         Reported         11/4/13       Gabapentin (Gabapentin) 300 Mg Capsule      300 MG PO BID         Reported         1/16/12      Current Medications      Current Medications Reviewed 6/21/18            EXAM      CONSTITUTIONAL: Pleasant elderly male in no acute distress.  Normal conversant.            EYES : Pink conjunctive, no ptosis, PERRL       ENMT : Dentures are present, Mallampati classification II, mild posterior     pharyngeal wall erythema, no sinus tenderness.   Nose and ears appear normal.      There is thrush present.        Neck: Nontender, no masses, no thyromegaly, no nodules      Resp : Diminished breath sounds bilaterally, resonant to percussion bilaterally    , no wheezing, rhonchi or crackles.        CVS  : No carotid bruits, s1s2 nl, RRR, no murmur, rubs or gallop, no     peripheral edema       Chest wall: Normal rise with inspiration, nontender on palpation      GI   : Abdomen soft, with no masses, no hepatosplenomegaly, no hernias, BS+      MSK  : Normal gait and station, no digital cyanosis or clubbing       Skin : No rashes, ulcerations or lesions, normal turgor and temperature      Neuro: CN II - XII intact, no sensory deficits, DTRs intact and symmetrical, no     motor weakness      Psych: Appropriate affect, A   Vtials      Vitals:             Height 5 ft 9 in / 175.26 cm           Weight 153 lbs 2 oz / 69.257442 kg           BSA 1.84 m2           BMI 22.6 kg/m2           Temperature 98.2 F / 36.78 C - Oral           Pulse 60           Respirations 16           Blood Pressure 138/90 Sitting, Right Arm           Pulse Oximetry 100%, room air             REVIEW      Results Reviewed      PCCS Results Reviewed?:  Yes Prev Lab Results, Yes Prev Radiology Results, Yes     Previous Mecial Records      Radiographic Results               [facility]                PACS RADIOLOGY REPORT            Patient: MARCIO HERNANDEZ   Acct: #[id number]   Report: #[id number]            UNIT #: [id number]    DOS: 18 1143      INSURANCE:MEDICARE PART A   LOCATION:[facility]     : 1932            PROVIDERS      ADMITTING:     ATTENDING: RAYA Orantes      FAMILY:  GILAMICHAEL   ORDERING:  RAYA Oarntes         OTHER:    DICTATING:  Garrett Munoz MD            REQ #:[id number]   EXAM:CXR2 - CHEST 2V AP PA LAT      REASON FOR EXAM:  HOARSNESS, COPD, GERD      REASON FOR VISIT:  HOARSNESS, COPD, GERD            *******Signed******         PROCEDURE:   CHEST AP/PA AND LATERAL             COMPARISON:   [facility], , CHEST AP/PA 1 VIEW, 2013, 22:    53.             INDICATIONS:   HORSENESS, COPD, GERD. PRE-OPERATIVE FOR QUAD SCOPE ON FRIDAY             FINDINGS:         Borderline heart size.  Mediastinal structures appear stable.  Thoracic aortic     ectasia.  There are       coarse bibasilar interstitial opacities.  No superimposed acute airspace     disease visualized.  Soft       tissues and skeleton intact.Endplate degenerative arthropathy of the thoracic     spine and multilevel       disc space narrowing.  Osteopenia              CONCLUSION:   No acute disease.  No significant change has occurred.                Garrett Munoz M.D.             Electronically Signed and Approved By: Garrett Munoz M.D. on 2018 at 12    :02                        Until signed, this is an unconfirmed preliminary report that may contain      errors and is subject to change.                                              RAMSR:      D:18 1202      PFT Results      Patient: MARCIO HERNANDEZ    [facility] #: [id number]         Report #: [id number]   : 1932      MR #: [id number]   Location: [facility]                                ***Signed***            PFT      DATE: 12/15/14      PFT Results      Pulmonary function test interpretation:            Spirometry shows moderate obstructive defect ( FEV1/ FVC ratio- 54, FEV1- 1.66     lits, 63% of predicted; FVC- 3.07 lits, 82% of predicted)            There is no response to bronchodilator demonstrated.            Lung volumes show airtrapping. ( % of predicted, 8.09 liters; RV 4.50     liters, 169% of predicted)            Flow volume loop is suggestive of obstructive process.            Diffusion capacity is normal. (80% of predicted)            Comparison: No previous tests to compare.            Conclusion: Low FEV1/FVC with 63% predicted of FEV1, air trapping and normal     diffusion capacity is suggestive of moderate chronic obstructive airway disease    , likely chronic bronchitis. Please correlate clinically.            Blu Delgado MD Dec 15, 2014 14:28               <Electronically signed by Blu Delgado MD> on 12/15/14 1428          on              CC:            Assessment      Notes      New Medications      * Aclidinium Bromide (Tudorza) 400 MCG INH: 1 PUFF INH RTBID #1      * TIOTROPIUM BROMIDE (Spiriva Respimat 2.5 mcg/Puff) 4 GM MIST.INHAL: 2 PUFFS     INH RTQDAY #1      PLAN:      The patient is a 86 year old male with history history of moderate chronic     obstructive pulmonary disease, gastrointestinal esophageal reflux disease,     postnasal drip, dry mouth and sore mouth.             1. Chronic obstructive pulmonary disease.  Since the cost of Tudorza is high I     will switch him to Spiriva Respimat 2.5 mg once daily. Continue with Advair     twice daily. I advised him to rinse his mouth after using inhalers. The cost of     Symbicort was high so he has gone back to Advair. He was educated on Spiriva     Respimat inhaler use today.        2. Hiatal hernia, reflux and nausea and vomiting. He is going to see     gastrointestinal surgeon within the next 2-3 weeks.       3. I will follow up with him in the next 3-4 months, earlier if needed.            Patient Education      Education resources provided:  Yes      Patient Education Provided:  COPD                 Disclaimer: Converted document may not contain table formatting or lab diagrams. Please see [employer] for the authenticated document.

## 2021-05-28 NOTE — PROGRESS NOTES
Patient: MARCIO HERNANDEZ     formerly [facility]: [id number]     Report: #[id number]  UNIT #: [id number]     : 1932    Encounter Date:2021  PRIMARY CARE: CHEYENNE SMITH  ***Signed***  --------------------------------------------------------------------------------------------------------------------  Chief Complaint      Encounter Date      2021            Primary Care Provider      CHEYENNE SMITH            Referring Provider      CHEYENNE SMITH            Patient Complaint      Patient is complaining of      Patient is here for a 8 week f/u, COPD            VITALS      Height 5 ft 9 in / 175.26 cm      Weight 140 lbs 0 oz / 63.215301 kg      BSA 1.78 m2      BMI 20.7 kg/m2      Temperature 98.5 F / 36.94 C - Tympanic      Pulse 72      Respirations 14      Blood Pressure 125/82 Sitting, Right Arm      Pulse Oximetry 98%, room air            HPI      The patient is an 89 year old male with a history of mycobacterium     nonchromogenicum, pulmonary infection, history of MRSA pseudomonas and     Klebsiella pneumoniae infection who is here for follow up. Since his last office    visit note he has been on rifampin, ethambutol and azithromycin on Monday,     Wednesday and Friday.  He was seeing Dr. Otero and is only having follow up in     2021.  He wants to see a new cardiologist.  His cough and shortness of breath    is better, hoarseness of voice even though is persistent is improved.        He is seeing a surgeon for his left third toe ulceration. He is on a foot brace     at this time.  He has been on Eliquis and is on Lasix.  He has no chest pain or     chest tightness. He has some decreased energy and fatigue, but overall is     improved. He is able to move around some.  He uses Wixela and Tudorza inhalers.     He tries to keep himself active around home.            ROS      Constitutional:  Denies: Fatigue, Fever, Weight gain, Weight loss, Chills,     Insomnia, Other      Respiratory/Breathing:  Denies:  Shortness of air, Wheezing, Cough, Hemoptysis,     Pleuritic pain, Other      Endocrine:  Denies: Polydipsia, Polyuria, Heat/cold intolerance, Diabetes, Other      Eyes:  Denies: Blurred vision, Vision Changes, Other      Ears, nose, mouth, throat:  Denies: Congestion, Dysphagia, Hearing Changes, Nose    Bleeding, Nasal Discharge, Throat pain, Tinnitus, Other      Cardiovascular:  Denies: Chest Pain, Exertional dyspnea, Peripheral Edema,     Palpitations, Syncope, Wake up Gasping for air, Orthopnea, Tachycardia, Other      Gastrointestinal:  Denies: Abdominal pain/cramping, Bloody stools, Constipation,    Diarrhea, Melena, Nausea, Vomiting, Other      Genitourinary:  Denies: Dysuria, Urinary frequency, Incontinence, Hematuria,     Urgency, Other      Musculoskeletal:  Denies: Joint Pain, Joint Stiffness, Joint Swelling, Myalgias,    Other      Hematologic/lymphatic:  DENIES: Lymphadenopathy, Bruising, Bleeding tendencies,     Other      Neurologic:  Denies: Headache, Numbness, Weakness, Seizures, Other      Psychiatric:  Denies: Anxiety, Appropriate Effect, Depression, Other      Sleep:  No: Excessive daytime sleep, Morning Headache?, Snoring, Insomnia?, Stop    breathing at sleep?, Other      Integumentary:  Denies: Rash, Dry skin, Skin Warm to Touch, Other            FAMILY/SOCIAL/MEDICAL HX      Surgical History:  Yes: Bowel Surgery (COLONOSCOPY), Cholecystectomy, Eye     Surgery, Head Surgery (BILATERAL CATARACTS/LENS IMPLANTS), Hernia Surgery,     Orthopedic Surgery (RIGHT ELBOW BURSECTOMY  X2); No: AAA Repair, Abdominal     Surgery, Angioplasty, Appendectomy, Back Surgery, Bladder Surgery, Breast     Surgery, CABG, Carotid Stenosis, Ear Surgery, Kidney Surgery, Nose Surgery, Oral    Surgery, Prostatectomy, Rectal Surgery, Spinal Surgery, Testicular Surgery,     Throat Surgery, Valve Replacement, Vascular Surgery, Other Surgeries      Diabetes - Family Hx:  Child      Is Father Still Living?:  No      Is Mother  Still Living?:  No      Social History:  No Tobacco Use, No Alcohol Use, No Recreational Drug use      Smoking status:  Never smoker      Anticoagulation Therapy:  No      Antibiotic Prophylaxis:  No      Medical History:  Yes: Arthritis (GENERALIZED), Chemotherapy/Cancer (SKIN CANCER    REMOVED EAR AND BACK AREA), Chronic Bronchitis/COPD (INHALERS), Seizures,     Hemorrhoids/Rectal Prob (ACID REFLUX), Hiatal Hernia, High Blood Pressure (ON     MEDS), Shortness Of Breath (HX OF EMPHYSEMA, BRONCHITIS,PNEUMONIA),     Miscellaneous Medical/oth (RESTLESS LEG SYNDROME); No: Anemia, Asthma, Blood     Disease, Broken Bones, Cataracts, Chemical Dependency, Emphysema, Chronic Liver     Disease, Colon Trouble, Colitis, Diverticulitis, Congestive Heart Failu,     Deafness or Ringing Ears, Depression, Anxiety, Bipolar Disorder, Diabetes,     Epilepsy, Glaucoma, Gall Stones, Gout, Head Injury, Heart Attack, Heart Murmur,     Hepatitis, HIV (Do not ask - volu, Kidney or Bladder Disease, Kidney Stones,     Migrane Headaches, Mitral Valve Prolapse, Psychiatric Care, Reflux Disease,     Rheumatic Fever, Sexually Transmitted Dis, Sinus Trouble, Skin     Disease/Psoriais/Ecz, Stroke, Thyroid Problem, Tuberculosis or Pos TB Te      Psychiatric History      none            PREVENTION      Hx Influenza Vaccination:  Yes      Date Influenza Vaccine Given:  Nov 1, 2020      Influenza Vaccine Declined:  No      2 or More Falls in Past Year?:  No      Fall Past Year with Injury?:  No      Hx Pneumococcal Vaccination:  Yes      Encouraged to follow-up with:  PCP regarding preventative exams.      Chart initiated by      Dionna Levine MA            ALLERGIES/MEDICATIONS      Allergies:        Coded Allergies:             MORPHINE (Verified  Allergy, Severe, confused, 4/21/21)           LEVOFLOXACIN (Verified  Allergy, Intermediate, nausea, 4/21/21)                  can take iv form      Uncoded Allergies:             IV DYE (Allergy,  Unknown, EYES SWOLLEN FOREHEAD RASH, 3/2/18)      Medications    Last Reconciled on 4/21/21 11:38 by BLU DELGADO MD      Azelastine/Fluticasone (Dymista Nasal Spray) 23 Gm Spray.pump      1 SPRAYS NARE EACH BID, #23 GM 3 Refills         Prov: JESSICA GREWAL         2/19/21       Fluticasone-Salmeterol 250-50 (Wixela 250-50 Inhub) 1 Each Blst.w.dev      1 PUFF INH BID for 30 Days, #1 INH 3 Refills         Prov: Blu Delgado         1/8/21       Doxycycline Hyclate (Doxycycline Hyclate*) 100 Mg Capsule      100 MG PO BID, #14 CAP 0 Refills         Prov: Blu Delgado         1/4/21       Ciprofloxacin HCl (Ciprofloxacin HCl) 750 Mg Tablet      750 MG PO BID, #30 TAB 0 Refills         Prov: Blu Delgado         1/4/21       Ethambutol (Ethambutol) 400 Mg Tablet      800 MG PO MOWEFR for 30 Days, #24 TAB 5 Refills         Prov: Blu Delgado         1/4/21       rifAMPin (rifAMPin) 300 Mg Cap      600 MG PO MOWEFR for 30 Days, #12 CAP 5 Refills         Prov: Blu Delgado         1/4/21       Azithromycin (Azithromycin) 500 Mg Tablet      500 MG PO MOWEFR for 30 Days, #12 TAB 5 Refills         Prov: Blu Delgado         1/4/21       Sucralfate (Sucralfate) 1 Gm Tablet      1 GM PO QIDAC, TAB         Reported         1/4/21       Lactobacillus Rhamnosus  (Probiotic Digestive Care) 1 Each Capsule      1 EACH PO, CAP         Reported         11/24/20       Fluticasone (Flovent HFA) 44 Mcg Inhaler      2 PUFFS INH RTBID, INH         Reported         11/24/20       Aclidinium Bromide (Tudorza) 400 Mcg Inh      1 PUFF INH RTBID, #1 MDI 0 Refills         Reported         11/24/20       (Eye Vitamin )   No Conflict Check      PO HS         Reported         11/24/20       (Juvenon)   No Conflict Check      PO BID         Reported         11/24/20       Multivitamins (Multi-Vitamin) 1 Each Tablet      1 TAB PO QDAY, #30 TAB 0 Refills         Reported         11/24/20       Meloxicam (Meloxicam*) 15 Mg Tablet      15 MG PO QDAY,  #30 TAB 0 Refills         Reported         11/24/20       Tamsulosin HCL (Tamsulosin HCL) 0.4 Mg Capsule      0.4 MG PO QDAY, #30 CAP 0 Refills         Reported         9/15/20       Metoprolol Tartrate (Metoprolol Tartrate) 25 Mg Tablet      12.5 MG PO BID, #60 TAB 0 Refills         Reported         9/15/20       Furosemide (Lasix) 20 Mg Tablet      20 MG PO QDAY, #30 TAB 0 Refills         Reported         9/15/20       Aspirin EC (Aspirin EC) 81 Mg Tablet.dr      81 MG PO QDAY, #30 TAB.EC 0 Refills         Reported         9/1/20       Coenzyme Q10 (Co Q-10) 100 Mg Capsule      100 MG PO QDAY for 30 Days, #30 CAP         Reported         9/1/20       Apixaban (Eliquis) 5 Mg Tablet      5 MG PO BID for 30 Days, #60 TAB         Reported         9/1/20       Lisinopril* (Lisinopril*) 5 Mg Tablet      2.5 MG PO QDAY, #30 TAB 0 Refills         Reported         9/1/20       Calcium Carbonate/Vitamin D3 (Calcium 600-Vit D3 400 Tablet) 1 Each Tablet      1 EACH PO QDAY, #60 TAB 0 Refills         Reported         8/21/20       Finasteride (Finasteride*) 5 Mg Tablet      5 MG PO HS, #30 TAB 0 Refills         Reported         8/21/20       Omeprazole (Omeprazole*) 20 Mg Tablet.dr      20 MG PO QDAY, #30 CAP 0 Refills         Reported         8/21/20       Venlafaxine HCl XR (Venlafaxine XR*) 150 Mg Tab.er.24      150 MG PO QDAY, CAP         Reported         8/21/20       Meclizine Hcl (Meclizine*) 25 Mg Tablet      25 MG PO BID, #60 TAB 0 Refills         Reported         8/21/20       (B12)   No Conflict Check      2500 UNITS PO QDAY         Reported         3/2/18       Cholecalciferol (Vitamin D3) (Vitamin D3) 1,000 Unit Tab      2000 UNITS PO QDAY, TAB         Reported         11/4/13      Current Medications      Current Medications Reviewed 4/21/21            EXAM      CONSTITUTIONAL: Pleasant elderly male in no acute distress, has minimal     conversational dyspnea and has improved compared to past year.        EYES :  Pink conjunctive, no ptosis, PERRL.       ENMT : Nose and ears appear normal, normal dentition, mild posterior pharyngeal     wall erythema, no sinus tenderness. Mallampati classification 2.        Neck: Nontender, no masses, no thyromegaly, no nodules.      Resp : Bilateral diminished breath sounds, no wheezing, no crackles, resonant to     percussion bilaterally.      CVS  : No carotid bruits, s1s2 nl, RRR, no murmur, rubs or gallop, no peripheral     edema       Chest wall: Normal rise with inspiration, nontender on palpation.      GI   : Abdomen soft, with no masses, no hepatosplenomegaly, no hernias, BS+      MSK  : Normal gait and station, no digital cyanosis or clubbing       Skin : No rashes, ulcerations or lesions, normal turgor and temperature      Neuro: CN II - XII intact, no sensory deficits, DTRs intact and symmetrical, no     motor weakness      Psych: Appropriate affect, A   Extremities: Trace pedal edema, nontender to palpation.      Vitals      Vitals:             Height 5 ft 9 in / 175.26 cm           Weight 140 lbs 0 oz / 63.017414 kg           BSA 1.78 m2           BMI 20.7 kg/m2           Temperature 98.5 F / 36.94 C - Tympanic           Pulse 72           Respirations 14           Blood Pressure 125/82 Sitting, Right Arm           Pulse Oximetry 98%, room air            REVIEW      Results Reviewed      PCCS Results Reviewed?:  Yes Prev Lab Results, Yes Prev Radiology Results, Yes     [facility]      [facility]adi[facility] Results               [facility]                PACS RADIOLOGY REPORT            Patient: MARCIO HERNANDEZ   Acct: #[id number]   Report: #[id number]            UNIT #: [id number]    DOS: 20 1300      INSURANCE:MEDICARE PART A   LOCATION:[facility]     : 1932            PROVIDERS      ADMITTING:     ATTENDING: ROMAIN PEREZ PA-C      FAMILY:  SARAH,CHEYENNE   ORDERING:  ROMAIN PEREZ PA-C          OTHER: PITA LEWIS   DICTATING:  DENTON BARBOUR MD            REQ #:[id number]   EXAM:WO - CT CHEST without CONTRAST      REASON FOR EXAM:        REASON FOR VISIT:  PNEUMONIA            *******Signed******         PROCEDURE:   CT CHEST WITHOUT CONTRAST             COMPARISON:   [facility], CT, CHEST W/O CONTRAST,     6/16/2020, 12:45.             INDICATIONS:   FOLLOW UP PREVIOUS CT CHEST.             TECHNIQUE:   CT images were created without the administration of contrast     material.               PROTOCOL:     Standard imaging protocol performed                RADIATION:     DLP: 299.4mGy*cm          Automated exposure control was utilized to minimize radiation dose.              FINDINGS:         There are again multiple bilateral pulmonary nodules.  The only nodule of     concern is located in the       posterior segment of the right upper lobe on image 35 of series 204.  There is a     9 mm nodule which       was not definitely present on the previous study.  I would recommend further     evaluation with a       whole-body PET-CT exam.  The remaining nodules are felt to be secondary to a chr    onic infectious or       inflammatory process.  There is bilateral apical calcified pleural/parenchymal     scarring.  Within       the both lower lobes, the right middle lobe, and the lingula, there is bronchial     wall thickening       with suggestion of bronchiectasis and intervening pulmonary scarring.  There     were small layering       pleural effusions on the previous study.  The patient still has a trace right     pleural effusion, but       the left pleural space is clear.  There are no enlarged mediastinal lymph nodes.      The hilar regions       are difficult to evaluate without contrast.  The heart is enlarged.  There are     coronary artery       atherosclerotic vascular calcifications.  The patient has a hiatal hernia.      There are partially       imaged round fluid density  masses in both kidneys which likely represent cysts.      There are       postsurgical changes within the upper abdomen.  There are no suspicious     osteolytic or sclerotic       lesions in the bony structures.             CONTINUED ON NEXT PAGE...             CONCLUSION:         1. There is a new 9 mm nodule in the posterior segment of the right upper lobe.      I would recommend       a whole-body PET-CT exam for follow-up.      2. The remaining pulmonary nodules are felt to be unchanged and secondary to a     chronic infectious       or inflammatory process.      3. Bronchiectasis and bronchial wall thickening as described.      4. Near complete resolution of the pleural effusions.  The patient still has a     trace right pleural       effusion.      5. Additional findings as noted above.              DENTON BARBOUR MD             Electronically Signed and Approved By: DENTON BARBOUR MD on 2020 at 14:19                               Until signed, this is an unconfirmed preliminary report that may contain      errors and is subject to change.                                              WORBR:      D:20 1419      PFT Results      Patient: WESLEY CUNNINGHAM   Acct: #[id number]   Report: #[id number]            UNIT #: [id number]    DOS:       LOCATION:[facility]     : 1932            PROVIDERS      ADMITTING:     FAMILY:  MD NONE         OTHER:       DICTATING:  Blu Delgado MD            REASON FOR VISIT:  COPD            *******Signed******                                    [facility]                          [employer] Information Management Services                            [place], [place]  [id number]               __________________________________________________________________________             Patient Name:                   Attending Physician:      Wesley Cunningham M.D.             Patient Visit # MR #            Admit Date  Disch Date      Location      [id number]    [id number]      06/28/2019                 CVS- -             Date of Birth      02/28/1932      __________________________________________________________________________      821 - DIAGNOSTIC REPORT             PULMONARY FUNCTION TEST             SPIROMETRY:      Spirometry shows moderate obstructive defect.      FEV1/FVC ratio is 51%.      FEV1 is 1.62 L, 66% of predicted.      FVC is 3.16 L, 89% of predicted.             BRONCHODILATOR RESPONSE:      There is no significant response to bronchodilator administration.             LUNG VOLUMES:      Lung volumes show hyperinflation and air trapping.      Total lung capacity is 9.43 L, 137% of predicted.      Residual volume is 6.26 L, 227% of predicted.             DIFFUSION:      Diffusion capacity is normal, 99% of predicted.             FLOW VOLUME LOOP:      Flow volume loop is suggestive of obstructive process.             CONCLUSION:      Low FEV1/FVC with low FEV1, normal FVC with air trapping and hyperinflation      and normal diffusion capacity.  It suggest moderate obstructive airway      disease, likely chronic bronchitis phenotype.  Please correlate clinically.             To be electronically signed in [employer]      [id number] BLU DELGADO M.D.             NK:bar      D:  07/02/2019 17:44      T:  07/02/2019 18:21      #[id number]             Until signed, this is an unconfirmed preliminary report that may contain      errors and is subject to change.                   Until signed, this is an unconfirmed preliminary report that may contain      errors and is subject to change.                     <Electronically signed by Blu Delgado MD>                07/08/19 1018               Blu Delgado MD:KEITH      D:07/02/19 1744            Assessment      A-fib         Atrial fibrillation, unspecified type - I48.91         Atrial fibrillation type: unspecified             CHF (congestive heart failure)         Congestive heart failure, unspecified HF chronicity, unspecified heart        failure type - I50.9         Heart failure type: unspecified         Heart failure chronicity: unspecified            Notes      New Medications      * Aclidinium Bromide (Tudorza) 400 MCG INH          Sample - Qty 4      Changed Medications      * rifAMPin 300 MG CAP:         From: 600 MG PO MOWEFR 30 Days #12         To: 600 MG PO MOWEFR 30 Days #60         Instructions: 2 capsules by mouth on monday, wednesday and friday      New Referrals      * Cardiology, Routine         LUCIEN STILES         Dx: A-fib - I48.91      PLAN:  The patient is an 89 year old male with mycobacterium nonchromogenicum     infection, pulmonary infection, history of MRSA pseudomonas and Klebsiella     pneumoniae, exertional dyspnea, chronic cough, rhinorrhea and CHF who is overall     slowly improving.  He also has left third toe ulceration.      1. Left third toe ulceration plan per podiatry service/surgery service. He may     need referral to podiatry service which he says is his surgeon, we are trying to     figure out who this is.      2.  Mycobacterium nonchromogenicum pulmonary infection. Continue with rifampin,     ethambutol and azithromycin.  He recently had blood work through Dr. Ananth mcwilliams.  I will check CBC and CMP in 2-3 months. I will get records from [facility] from the last lab work done two weeks ago.        3.  CHF and afib. Continue with Eliquis, continue with Lasix.  I will refer him     to Dr. Stiles for now.      4. Follow up in 3-4 months, earlier if needed.            Patient Education      Education resources provided:  Yes      Patient Education Provided:  Acute Bronchitis            Electronically signed by Blu Delgado  05/01/2021 19:10       Disclaimer: Converted document may not contain table formatting or lab diagrams. Please see [employer] for  the authenticated document.

## 2021-05-28 NOTE — PROGRESS NOTES
Patient: MARCIO HERNANDEZ     [id number]     Report: #[id number]  UNIT #: [id number]     : 1932    Encounter Date:2019  PRIMARY CARE: CHEYENNE SMITH  ***Signed***  --------------------------------------------------------------------------------------------------------------------  Chief Complaint      Encounter Date      2019            Primary Care Provider      MIHCAEL URIOSTEGUI            Referring Provider      MICHAEL URIOSTEGUI            Patient Complaint      Patient is complaining of      Pt here for 3-4m f/u and results            VITALS      Height 5 ft 9 in / 175.26 cm      Weight 163 lbs 0 oz / 73.277932 kg      BSA 1.89 m2      BMI 24.1 kg/m2      Temperature 98.4 F / 36.89 C - Oral      Pulse 65      Respirations 16      Blood Pressure 120/76 Sitting, Right Arm      Pulse Oximetry 98%, room air      Initial Exhaled Nitrous Oxide      Exhaled Nitrous Oxide Results:  9            HPI      The patient is a pleasant 87 year old white male patient of Dr. Cain, also     seen by me for previous office visit.  He has a history of moderate COPD,     gastroesophageal reflux disease, postnasal drip and has had PFTs and six minute     walk test done since his last visit. Six minute walk test was normal without any    desaturations. PFTs continue to show moderate obstructive defect with no     significant change from previous PFTs done about five years earlier. He is on     Advair and Tudorza and feels like they are helping him.  His main issue is that     for about two weeks he has felt dizzy when he walks, feels off balance and is     also having some nasal congestion.  His PCP put him back on Flonase, put him on     Lasix and put him on an increased dose of Flomax.  He has only been on these     medications now for a couple of days and has not noticed a significant     difference so far, but already has a follow up appointment in about two weeks     with his PCP to discuss again.  Of  note, several weeks ago, patient was noted to    have afib while [facility] having PFTs and six minute walk test     and has been referred to Dr. Otero in [place] for this. He has an     appointment scheduled in August. The patient denies any palpitations, denies     syncope or near syncope, denies increased dyspnea, coughing or wheezing.              I have reviewed her ROS, medical, surgical and family history and agree with     those as entered in the chart.      Copies To:   Blu Delgado ;            DOMINGO      Constitutional:  Complains of: Fatigue; Denies: Fever, Weight gain, Weight loss,    Chills, Insomnia, Other      Respiratory/Breathing:  Complains of: Shortness of air; Denies: Wheezing, Cough,    Hemoptysis, Pleuritic pain, Other      Endocrine:  Denies: Polydipsia, Polyuria, Heat/cold intolerance, Diabetes, Other      Eyes:  Denies: Blurred vision, Vision Changes, Other      Ears, nose, mouth, throat:  Denies: Congestion, Dysphagia, Hearing Changes, Nose    Bleeding, Nasal Discharge, Throat pain, Tinnitus, Other      Cardiovascular:  Denies: Chest Pain, Exertional dyspnea, Peripheral Edema,     Palpitations, Syncope, Wake up Gasping for air, Orthopnea, Tachycardia, Other      Gastrointestinal:  Denies: Abdominal pain/cramping, Bloody stools, Constipation,    Diarrhea, Melena, Nausea, Vomiting, Other      Genitourinary:  Denies: Dysuria, Urinary frequency, Incontinence, Hematuria,     Urgency, Other      Musculoskeletal:  Denies: Joint Pain, Joint Stiffness, Joint Swelling, Myalgias,    Other      Hematologic/lymphatic:  DENIES: Lymphadenopathy, Bruising, Bleeding tendencies,     Other      Neurologic:  Denies: Headache, Numbness, Weakness, Seizures, Other      Psychiatric:  Denies: Anxiety, Appropriate Effect, Depression, Other      Sleep:  No: Excessive daytime sleep, Morning Headache?, Snoring, Insomnia?, Stop    breathing at sleep?, Other      Integumentary:  Denies: Rash, Dry skin,  Skin Warm to Touch, Other            FAMILY/SOCIAL/MEDICAL HX      Current History      Lives alone. Never smoked. No alcohol. No illicit drugs use.       Used to work as a farmer.      No pets or birds at home.      Surgical History:  Yes: Cholecystectomy, Eye Surgery, Head Surgery (BILATERAL     CATARACTS REMOVED), Hernia Surgery, Orthopedic Surgery (RIGHT ELBOW BURSECTOMY);    No: AAA Repair, Abdominal Surgery, Angioplasty, Appendectomy, Back Surgery,     Bladder Surgery, Bowel Surgery, Breast Surgery, CABG, Carotid Stenosis, Ear     Surgery, Kidney Surgery, Nose Surgery, Oral Surgery, Prostatectomy, Rectal     Surgery, Spinal Surgery, Testicular Surgery, Throat Surgery, Valve Replacement,     Vascular Surgery, Other Surgeries      Diabetes - Family Hx:  Child      Is Father Still Living?:  No      Is Mother Still Living?:  No       Family History:  Yes      Social History:  No Tobacco Use, No Alcohol Use, No Recreational Drug use      Smoking status:  Never smoker      Occupation:  farmer      Anticoagulation Therapy:  No      Antibiotic Prophylaxis:  No      Medical History:  Yes: Arthritis (GENERALIZED), Chemotherapy/Cancer (SKIN CANCER    REMOVED EAR AND BACK AREA), Chronic Bronchitis/COPD, Hemorrhoids/Rectal Prob     (ACID REFLUX), Hiatal Hernia, High Blood Pressure, Shortness Of Breath (HX OF     EMPHYSEMA, BRONCHITIS), Miscellaneous Medical/oth (RESTLESS LEG SYNDROME); No:     Alcoholism, Allergies, Anemia, Asthma, Blood Disease, Broken Bones, Cataracts,     Chemical Dependency, Emphysema, Chronic Liver Disease, Colon Trouble, Colitis,     Diverticulitis, Congestive Heart Failu, Deafness or Ringing Ears, Convulsions,     Depression, Anxiety, Bipolar Disorder, Diabetes, Epilepsy, Seizures,     Forgetfullness, Glaucoma, Gall Stones, Gout, Head Injury, Heart Attack, Heart     Murmur, Hepatitis, High Cholesterol, HIV (Do not ask - volu, Jaundice, Kidney or    Bladder Disease, Kidney Stones, Migrane Headaches,  Mitral Valve Prolapse, Night     sweats, Phlebitis, Psychiatric Care, Reflux Disease, Rheumatic Fever, Sexually     Transmitted Dis, Sinus Trouble, Skin Disease/Psoriais/Ecz, Stroke, Thyroid     Problem, Tuberculosis or Pos TB Te      Psychiatric History      NOne            PREVENTION      Hx Influenza Vaccination:  Yes      Date Influenza Vaccine Given:  Oct 1, 2018      Influenza Vaccine Declined:  No      2 or More Falls Past Year?:  No      Fall Past Year with Injury?:  No      Hx Pneumococcal Vaccination:  Yes      Encouraged to follow-up with:  PCP regarding preventative exams.      Chart initiated by      Claire Bryan MA            ALLERGIES/MEDICATIONS      Allergies:        Coded Allergies:             LEVOFLOXACIN (Verified  Allergy, Unknown, NAUSEA/VOMITING, 7/18/19)                  PT. STATED HE CAN TAKE IT IV THOUGH, NO REACTION      Uncoded Allergies:             IV DYE (Allergy, Unknown, EYES SWOLLEN FOREHEAD RASH, 3/2/18)      Medications    Last Reconciled on 7/18/19 13:37 by KATHERINE CHEN      Furosemide* (Lasix*) 20 Mg Tablet      20 MG PO QDAY, #30 TAB 0 Refills         Reported         7/18/19       Finasteride (Finasteride*) 5 Mg Tablet      5 MG PO QDAY, #30 TAB 0 Refills         Reported         7/18/19       MDI-Advair 250/50 (Advair 250/50 Diskus) 1 Each Blst.w.dev      1 PUFF INH BID for 90 Days, #3 INH 3 Refills         Prov: Blu Delgado         6/10/19       Nathan-Fluticasone (Fluticasone 50 mcg) 16 Gm Spray.susp      2 PUFFS NARE EACH QDAY, #1 BOTTLE 6 Refills         Prov: Blu Delgado         3/26/19       Aclidinium Bromide (Tudorza) 400 Mcg Inh               Prov: Blu Delgado         3/26/19       MDI-Albuterol (Ventolin HFA) 8 Gm Hfa.aer.ad      2 PUFFS INH Q4-6H PRN for DYSPNEA, #3 INH 3 Refills         Prov: Blu Delgado         11/8/18       Aclidinium Bromide (Tudorza) 400 Mcg Inh      1 PUFF INH RTBID, #1 MDI 0 Refills         Reported         6/21/18       (B12)   No  Conflict Check      2500 UNITS PO QDAY         Reported         3/2/18       (Eye Vitamin)   No Conflict Check      QDAY         Reported         3/2/18       Lansoprazole (Prevacid) 15 Mg Capsule.dr      15 MG PO QDAY, CAP         Reported         12/7/17       Meclizine Hcl (Meclizine*) 25 Mg Tablet      25 MG PO BID, #60 TAB 0 Refills         Reported         7/1/15       DULoxetine (Cymbalta) 60 Mg Capsule      60 MG PO QDAY, #30 CAP 0 Refills         Reported         11/3/14       Multivitamins (Multi-Day Vitamin) 1 Tab Tablet      1 TAB PO QDAY, TAB         Reported         11/7/13       Tamsulosin HCL (Flomax) 0.4 Mg Cap.sr.24h      0.4 MG PO BID, CAP         Reported         11/7/13       Cholecalciferol (Vitamin D3*) 1,000 Unit Tab      2000 UNITS PO QDAY, TAB         Reported         11/4/13       Gabapentin (Gabapentin) 300 Mg Capsule      300 MG PO QDAY         Reported         1/16/12      Current Medications      Current Medications Reviewed 7/18/19            EXAM      CONSTITUTIONAL: Pleasant  normal conversant.       EYES : Pink conjunctive, no ptosis, PERRL.       ENMT : Nose and ears appear normal, normal dentition, mild posterior pharyngeal     wall erythema, no sinus tenderness. Mallampati classification       Neck: Nontender, no masses, no thyromegaly, no nodules.      Resp : Lungs are grossly clear to auscultation.  No wheezes, rhonchi or crackles    appreciated.  Normal work of breathing.        CVS  : No carotid bruits, s1s2 nl, RRR, no murmur, rubs or gallop, no peripheral    edema       Chest wall: Normal rise with inspiration, nontender on palpation.      GI   : Abdomen soft, with no masses, no hepatosplenomegaly, no hernias, BS+      MSK  : Normal gait and station, no digital cyanosis or clubbing       Skin : No rashes, ulcerations or lesions, normal turgor and temperature      Neuro: CN II - XII intact, no sensory deficits, DTRs intact and symmetrical, no     motor weakness      Psych:  Appropriate affect, A   Vitals      Vitals:             Height 5 ft 9 in / 175.26 cm           Weight 163 lbs 0 oz / 73.490767 kg           BSA 1.89 m2           BMI 24.1 kg/m2           Temperature 98.4 F / 36.89 C - Oral           Pulse 65           Respirations 16           Blood Pressure 120/76 Sitting, Right Arm           Pulse Oximetry 98%, room air            REVIEW      Results Reviewed      P[facility] Results Reviewed?:  Yes Prev Lab Results, Yes Prev Radiology Results, Yes     Previous [facility] Records      Lab Results      I personally reviewed previous lab work, imaging and provider notes including     recent PFTs and six minute walk test.            Assessment      Notes      New Medications      * Finasteride (Finasteride*) 5 MG TABLET: 5 MG PO QDAY #30      * Furosemide* (Lasix*) 20 MG TABLET: 20 MG PO QDAY #30      * Aclidinium Bromide (Tudorza) 400 MCG INH: 1 PUFF INH RTBID #3      Renewed Medications      * MDI-Advair 250/50 (Advair 250/50 Diskus) 1 EACH BLST.W.DEV: 1 PUFF INH BID 90       Days #3      Changed Medications      * Gabapentin 300 MG CAPSULE: 300 MG PO QDAY         Instructions: 1 CAPS      * Tamsulosin HCL (Flomax) 0.4 MG CAP.SR.24H: 0.4 MG PO BID         Instructions: 1 TAB      Discontinued Medications      * Aclidinium Bromide (Tudorza) 400 MCG INH          Sample - Qty 6         Instructions: 1 puff bid         Dx: COPD (chronic obstructive pulmonary disease) - J44.9      ASSESSMENT:       1. Moderate COPD without acute exacerbation.      2. Gastroesophageal reflux disease.      3. Postnasal drip.      4.  Tobacco abuse with cigarettes in remission.      5. Hiatal hernia.      6. Recent dizziness, being worked up by PCP.              PLAN:      1. I have discussed with the patient regarding PFTs with no significant change     and normal six minute walk test.  I will continue him on Advair on Tudorza.  I     have refilled those and given him samples of Tudorza today.  As recent      adjustments to patient's medications were already made by his PCP just a couple     of days ago regarding his dizziness, I recommend that he continue with those     recommendations, continue with Flonase, Lasix, and Flomax and follow up with PCP    as scheduled.        2.  I did a [place] Hallpike maneuver on patient to evaluate for vertigo and it was     negative.        3.  I have asked him to keep his cardiology appointment with Dr. Delgado in August    to discuss afib and he has verbalized understanding.  Initially, patient was     very confused as to why we could not treat him for afib and I have discussed     with him that he does need to see a cardiologist for this.  He verbalized u    nderstanding.      4. I will have him follow up in four months with Dr. Delgado, sooner if needed.            Patient Education      Patient Education Provided:  COPD, How to use an Inhaler      Time Spent:  > 50% /[facility]                 Disclaimer: Converted document may not contain table formatting or lab diagrams. Please see [email or web address] for the authenticated document.
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
## 2021-05-28 NOTE — PROGRESS NOTES
Patient: MARCIO HERNANDEZ     [id number]     Report: #[id number]  UNIT #: [id number]     : 1932    Encounter Date:2018  PRIMARY CARE: CHEYENNE SMITH  ***Signed***  --------------------------------------------------------------------------------------------------------------------  Chief Complaint      Encounter Date      2018            Primary Care Provider      MICHAEL URIOSTEGUI            Referring Provider      MICHAEL URIOSTEGUI            Patient Complaint      Patient is complaining of      Pt here for 4-5 month follow up/[facility]            VITALS      Height 5 ft 9 in / 175.26 cm      Weight 157 lbs 0 oz / 71.006437 kg      BSA 1.86 m2      BMI 23.2 kg/m2      Temperature 98.1 F / 36.72 C - Oral      Pulse 60      Respirations 14      Blood Pressure 137/90 Sitting, Right Arm      Pulse Oximetry 98%, Room air      Initial Exhaled Nitrous Oxide      Exhaled Nitrous Oxide Results:  9            HPI      The patient is an 86 year old male with a history of moderate COPD,     gastroesophageal reflux disease, postnasal drip, dry mouth and sore mouth.  He     is here for follow up.  Since his last office visit he has been on Tudorza twice    a day, Advair twice a day and a rescue inhaler.  He rarely needs the rescue     inhaler.  Over the last month or so, he has decreased appetite and has lost     around 4-5 pounds. He thinks that he is dehydrated and does not have good     appetite. He also was seen by a gastric surgeon for his nausea, vomiting,     gastric reflux, hiatal hernia and was told that there was no significant change     overall.  His breathing is about the same, he already had a flu shot. He has no     significant changes in weight or appetite.            ROS      Constitutional:  Denies: Fatigue, Fever, Weight gain, Weight loss, Chills,     Insomnia, Other      Respiratory/Breathing:  Complains of: Shortness of air; Denies: Wheezing, Cough,    Hemoptysis, Pleuritic pain, Other       Endocrine:  Denies: Polydipsia, Polyuria, Heat/cold intolerance, Diabetes, Other      Eyes:  Denies: Blurred vision, Vision Changes, Other      Ears, nose, mouth, throat:  Denies: Mouth lesions, Thrush, Throat pain,     Hoarseness, Allergies/Hay Fever, Post Nasal Drip, Headaches, Recent Head Injury,    Nose Bleeding, Neck Stiffness, Thyroid Mass, Hearing Loss, Ear Fullness, Dry     Mouth, Nasal or Sinus Pain, Dry Lips, Nasal discharge, Nasal congestion, Other      Cardiovascular:  Denies: Palpitations, Syncope, Claudication, Chest Pain, Wake     up Gasping for air, Leg Swelling, Irregular Heart Rate, Cyanosis, Dyspnea on     Exertion, Other      Gastrointestinal:  Denies: Nausea, Constipation, Diarrhea, Abdominal pain,     Vomiting, Difficulty Swallowing, Reflux/Heartburn, Dysphagia, Jaundice,     Bloating, Melena, Bloody stools, Other      Genitourinary:  Denies: Urinary frequency, Incontinence, Hematuria, Urgency,     Nocturia, Dysuria, Testicular problems, Other      Musculoskeletal:  Denies: Joint Pain, Joint Stiffness, Joint Swelling, Myalgias,    Other      Hematologic/lymphatic:  DENIES: Lymphadenopathy, Bruising, Bleeding tendencies,     Other      Neurological:  Denies: Headache, Numbness, Weakness, Seizures, Other      Psychiatric:  Denies: Anxiety, Appropriate Effect, Depression, Other      Sleep:  No: Excessive daytime sleep, Morning Headache?, Snoring, Insomnia?, Stop    breathing at sleep?, Other      Integumentary:  Denies: Rash, Dry skin, Skin Warm to Touch, Other      Immunologic/Allergic:  Denies: Latex allergy, Seasonal allergies, Asthma,     Urticaria, Eczema, Other      Immunization status:  No: Up to date            FAMILY/SOCIAL/MEDICAL HX      Current History      Lives alone. Never smoked. No alcohol. No illicit drugs use.       Used to work as a farmer.      No pets or birds at home.      Surgical History:  Yes: Cholecystectomy, Eye Surgery, Head Surgery (BILATERAL     CATARACTS REMOVED),  Hernia Surgery, Orthopedic Surgery (RIGHT ELBOW BURSECTOMY);    No: AAA Repair, Abdominal Surgery, Angioplasty, Appendectomy, Back Surgery,     Bladder Surgery, Bowel Surgery, Breast Surgery, CABG, Carotid Stenosis, Ear Surg    rossana, Kidney Surgery, Nose Surgery, Oral Surgery, Prostatectomy, Rectal Surgery,     Spinal Surgery, Testicular Surgery, Throat Surgery, Valve Replacement, Vascular     Surgery, Other Surgeries      Diabetes - Family Hx:  Child      Is Father Still Living?:  No      Is Mother Still Living?:  No       Family History:  Yes      Social History:  No Tobacco Use, No Alcohol Use, No Recreational Drug use      Smoking status:  Never smoker      Occupation:  farmer      Anticoagulation Therapy:  No      Antibiotic Prophylaxis:  No      Medical History:  Yes: Arthritis (GENERALIZED), Chemotherapy/Cancer (SKIN CANCER    REMOVED EAR AND BACK AREA), Chronic Bronchitis/COPD, Hemorrhoids/Rectal Prob     (ACID REFLUX), Hiatal Hernia, High Blood Pressure, Shortness Of Breath (HX OF     EMPHYSEMA, BRONCHITIS), Miscellaneous Medical/oth (RESTLESS LEG SYNDROME); No:     Alcoholism, Allergies, Anemia, Asthma, Blood Disease, Broken Bones, Cataracts,     Chemical Dependency, Emphysema, Chronic Liver Disease, Colon Trouble, Colitis,     Diverticulitis, Congestive Heart Failu, Deafness or Ringing Ears, Convulsions,     Depression, Anxiety, Bipolar Disorder, Diabetes, Epilepsy, Seizures,     Forgetfullness, Glaucoma, Gall Stones, Gout, Head Injury, Heart Attack, Heart     Murmur, Hepatitis, High Cholesterol, HIV (Do not ask - volu, Jaundice, Kidney or    Bladder Disease, Kidney Stones, Migrane Headaches, Mitral Valve Prolapse, Night     sweats, Phlebitis, Psychiatric Care, Reflux Disease, Rheumatic Fever, Sexually     Transmitted Dis, Sinus Trouble, Skin Disease/Psoriais/Ecz, Stroke, Thyroid     Problem, Tuberculosis or Pos TB Te      Psychiatric History      None            PREVENTION      Hx Influenza Vaccination:   Yes      Date Influenza Vaccine Given:  Oct 1, 2018      Influenza Vaccine Declined:  No      2 or More Falls Past Year?:  No      Fall Past Year with Injury?:  No      Hx Pneumococcal Vaccination:  Yes      Encouraged to follow-up with:  PCP regarding preventative exams.      Chart initiated by      Susan Leblanc MA            ALLERGIES/MEDICATIONS      Allergies:        Coded Allergies:             LEVOFLOXACIN (Verified  Allergy, Unknown, NAUSEA/VOMITING, 11/8/18)                  PT. STATED HE CAN TAKE IT IV THOUGH, NO REACTION      Uncoded Allergies:             IV DYE (Allergy, Unknown, EYES SWOLLEN FOREHEAD RASH, 3/2/18)      Medications    Last Reconciled on 11/8/18 13:25 by BLU DELGADO MD      MDI-Albuterol (Ventolin HFA) 8 Gm Hfa.aer.ad      2 PUFFS INH Q4-6H PRN for DYSPNEA, #3 INH 3 Refills         Prov: Blu Delgado         11/8/18       MDI-Advair 250/50 (Advair 250/50 Diskus) 1 Each Blst.w.dev      1 PUFF INH RTBID, #3 INH 3 Refills         Prov: Blu Delgado         11/8/18       Tiotropium Bromide (Spiriva Respimat 2.5 mcg/Puff) 4 Gm Mist.inhal      2 PUFFS INH RTQDAY, #3 MDI 3 Refills         Prov: Blu Delgado         11/8/18       Tiotropium Bromide (Spiriva Respimat 2.5 mcg/Puff) 4 Gm Mist.inhal      2 PUFFS INH RTQDAY, #1 MDI 9 Refills         Prov: Blu Delgado         6/21/18       Aclidinium Bromide (Tudorza) 400 Mcg Inh      1 PUFF INH RTBID, #1 MDI 0 Refills         Reported         6/21/18       MDI-Advair 250/50 (Advair 250/50 Diskus) 1 Each Blst.w.dev      1 PUFF INH RTBID, #1 INH 3 Refills         Prov: Mallory Lopez PA-C         5/3/18       (B12)   No Conflict Check      2500 UNITS PO QDAY         Reported         3/2/18       (Eye Vitamin)   No Conflict Check      QDAY         Reported         3/2/18       Lansoprazole (Prevacid*) 15 Mg Capsule.      15 MG PO QDAY, CAP         Reported         12/7/17       Meclizine Hcl (Meclizine*) 25 Mg Tablet      25 MG PO BID, #60 TAB 0  Refills         Reported         7/1/15       DULoxetine (Cymbalta) 60 Mg Capsule      60 MG PO QDAY, #30 CAP 0 Refills         Reported         11/3/14       Multivitamins (Multi-Day Vitamin) 1 Tab Tablet      1 TAB PO QDAY, TAB         Reported         11/7/13       Tamsulosin HCL (Flomax) 0.4 Mg Cap.sr.24h      0.4 MG PO HS, CAP         Reported         11/7/13       Cholecalciferol (Vitamin D3*) 1,000 Unit Tab      2000 UNITS PO QDAY, TAB         Reported         11/4/13       Gabapentin (Gabapentin) 300 Mg Capsule      300 MG PO BID         Reported         1/16/12      Current Medications      Current Medications Reviewed 11/8/18            EXAM      CONSTITUTIONAL: Pleasant elderly male in no acute distress.  Normal conversant.           EYES : Pink conjunctive, no ptosis, PERRL       ENMT : Dentures are present, Mallampati classification II, mild posterior     pharyngeal wall erythema, no sinus tenderness.   Nose and ears appear normal.      There is thrush present.        Neck: Nontender, no masses, no thyromegaly, no nodules      Resp : Diminished breath sounds bilaterally, resonant to percussion bilaterally,    no wheezing, rhonchi or crackles.        CVS  : No carotid bruits, s1s2 nl, RRR, no murmur, rubs or gallop, no peripheral    edema       Chest wall: Normal rise with inspiration, nontender on palpation      GI   : Abdomen soft, with no masses, no hepatosplenomegaly, no hernias, BS+      MSK  : Normal gait and station, no digital cyanosis or clubbing       Skin : No rashes, ulcerations or lesions, normal turgor and temperature      Neuro: CN II - XII intact, no sensory deficits, DTRs intact and symmetrical, no     motor weakness      Psych: Appropriate affect, A   Vtials      Vitals:             Height 5 ft 9 in / 175.26 cm           Weight 157 lbs 0 oz / 71.192555 kg           BSA 1.86 m2           BMI 23.2 kg/m2           Temperature 98.1 F / 36.72 C - Oral           Pulse 60            Respirations 14           Blood Pressure 137/90 Sitting, Right Arm           Pulse Oximetry 98%, Room air            REVIEW      Results Reviewed      PCCS Results Reviewed?:  Yes Prev Lab Results, Yes Prev Radiology Results, Yes     Previous Mecial Records      Radiographic Results               [facility]                PACS RADIOLOGY REPORT            Patient: MARCIO HERNANDEZ   Acct: #[id number]   Report: #[id number]            UNIT #: [id number]    DOS: 18 1143      INSURANCE:MEDICARE PART A   LOCATION:[facility]     : 1932            PROVIDERS      ADMITTING:     ATTENDING: RAYA Orantes      FAMILY:  MICHAEL URIOSTEGUI   ORDERING:  RAYA Orantes         OTHER:    DICTATING:  Garrett Munoz MD            REQ #:[id number]   EXAM:CXR2 - CHEST 2V AP PA LAT      REASON FOR EXAM:  HOARSNESS, COPD, GERD      REASON FOR VISIT:  HOARSNESS, COPD, GERD            *******Signed******         PROCEDURE:   CHEST AP/PA AND LATERAL             COMPARISON:   [facility], , CHEST AP/PA 1 VIEW, 2013,     22:53.             INDICATIONS:   HORSENESS, COPD, GERD. PRE-OPERATIVE FOR QUAD SCOPE ON FRIDAY             FINDINGS:         Borderline heart size.  Mediastinal structures appear stable.  Thoracic aortic     ectasia.  There are       coarse bibasilar interstitial opacities.  No superimposed acute airspace disease    visualized.  Soft       tissues and skeleton intact.Endplate degenerative arthropathy of the thoracic     spine and multilevel       disc space narrowing.  Osteopenia              CONCLUSION:   No acute disease.  No significant change has occurred.                Garrett Munoz M.D.             Electronically Signed and Approved By: Garrett Munoz M.D. on 2018 at     12:02                        Until signed, this is an unconfirmed preliminary report that may contain      errors and is subject to change.                                               RAMSR:      D:18 1202      PFT Results      Patient: MARCIO HERNANDEZ   [facility] #: [id number]         Report #: [id number]   : 1932      MR #: [id number]   Location: [facility]                                ***Signed***            PFT      DATE: 12/15/14      PFT Results      Pulmonary function test interpretation:            Spirometry shows moderate obstructive defect ( FEV1/ FVC ratio- 54, FEV1- 1.66     lits, 63% of predicted; FVC- 3.07 lits, 82% of predicted)            There is no response to bronchodilator demonstrated.            Lung volumes show airtrapping. ( % of predicted, 8.09 liters; RV 4.50     liters, 169% of predicted)            Flow volume loop is suggestive of obstructive process.            Diffusion capacity is normal. (80% of predicted)            Comparison: No previous tests to compare.            Conclusion: Low FEV1/FVC with 63% predicted of FEV1, air trapping and normal     diffusion capacity is suggestive of moderate chronic obstructive airway disease,    likely chronic bronchitis. Please correlate clinically.            Blu Delgado MD                 Dec 15, 2014 14:28               <Electronically signed by Blu Delgado MD> on 12/15/14 1428          on            Assessment      Notes      New Medications      * TIOTROPIUM BROMIDE (Spiriva Respimat 2.5 mcg/Puff) 4 GM MIST.INHAL: 2 PUFFS       INH RTQDAY #3      * MDI-Advair 250/50 (Advair 250/50 Diskus) 1 EACH BLST.W.DEV: 1 PUFF INH RTBID       #3      * MDI-Albuterol (Ventolin HFA) 8 GM HFA.AER.AD: 2 PUFFS INH Q4-6H PRN DYSPNEA #3      * UMECLIDINIUM BROMIDE (Incruse Ellipta) 62.5 MCG BLST.W.DEV: 1 PUFF INH RTQDAY       #1      PLAN:      The patient is a 86 year old male with history history of moderate chronic     obstructive pulmonary disease, gastrointestinal esophageal reflux disease,     postnasal drip, dry mouth and sore throat.              1. Chronic obstructive pulmonary disease.   Continue with Tudorza bid, Advair     twice a day and albuterol as needed.              2. Hiatal hernia, nausea and vomiting. Follow up with gastroenterology and     gastric surgeon.  Plan per them. If he has decreased appetite, we may be able to    try cyproheptadine in the future, currently monitoring clinically.  If his     Tudorza is not covered by insurance, we will switch it to Spiriva.  I have given    him a sample of Tudorza today.            3.  He already had flu shot this year.            4. He does not smoke anymore.            5.  Follow up in 3-4 months, earlier if needed.            Patient Education      Education resources provided:  Yes      Patient Education Provided:  Acute Bronchitis                 Disclaimer: Converted document may not contain table formatting or lab diagrams. Please see [employer] for the authenticated document.

## 2021-05-28 NOTE — PROGRESS NOTES
Patient: MARCIO HERNANDEZ     [facility]: [id number]     Report: #[id number]  UNIT #: [id number]     : 1932    Encounter Date:2019  PRIMARY CARE: CHEYENNE SMITH  ***Signed***  --------------------------------------------------------------------------------------------------------------------  Chief Complaint      Encounter Date      Dec 9, 2019            Primary Care Provider      CHEYENNE SMITH            Referring Provider      MICHAEL URIOSTEGUI            Patient Complaint      Patient is complaining of      f/u for mod copd            VITALS      Height 5 ft 9 in / 175.26 cm      Weight 161 lbs 8 oz / 73.288480 kg      BSA 1.89 m2      BMI 23.8 kg/m2      Temperature 97.9 F / 36.61 C - Oral      Pulse 70      Respirations 16      Blood Pressure 126/74 Sitting, Left Arm      Pulse Oximetry 96%, room air      Initial Exhaled Nitrous Oxide      Exhaled Nitrous Oxide Results:  9            HPI      The patient is an 87 year old male with moderate COPD, gastroesophageal reflux     disease, postnasal drip, tobacco abuse with cigarettes in remission, hiatal     hernia and recent dizziness.  She is here for follow up.  Since his last office     visit, he had afib and is following with cardiology, Dr. Otero's service.  He     has shortness of breath with activities.  He is suppose to be on Advair and     Spiriva, but Spiriva cost was high.  He has run out of Spiriva and has not been     using it. He has not needed any antibiotics or steroids since his last office     visit.  Since last office visit, he was started on Amlodipine through his     cardiologist and has also been on fludrocortisone which was started by his PCP.     He has no cough, wheezing, nausea, vomiting, chest pain or chest tightness.  He     feels relatively weak compared to how he was in the past.            ROS      Constitutional:  Complains of: Fatigue; Denies: Fever, Weight gain, Weight loss,    Chills, Insomnia, Other      Respiratory/Breathing:   Complains of: Shortness of air; Denies: Wheezing, Cough,    Hemoptysis, Pleuritic pain, Other      Endocrine:  Denies: Polydipsia, Polyuria, Heat/cold intolerance, Diabetes, Other      Eyes:  Denies: Blurred vision, Vision Changes, Other      Ears, nose, mouth, throat:  Complains of: Nasal Discharge; Denies: Congestion,     Dysphagia, Hearing Changes, Nose Bleeding, Throat pain, Tinnitus, Other      Cardiovascular:  Denies: Chest Pain, Exertional dyspnea, Peripheral Edema,     Palpitations, Syncope, Wake up Gasping for air, Orthopnea, Tachycardia, Other      Gastrointestinal:  Complains of: Diarrhea; Denies: Abdominal pain/cramping,     Bloody stools, Constipation, Melena, Nausea, Vomiting, Other      Genitourinary:  Denies: Dysuria, Urinary frequency, Incontinence, Hematuria,     Urgency, Other      Musculoskeletal:  Denies: Joint Pain, Joint Stiffness, Joint Swelling, Myalgias,    Other      Hematologic/lymphatic:  DENIES: Lymphadenopathy, Bruising, Bleeding tendencies,     Other      Neurologic:  Complains of: Weakness (daytime weakness  ); Denies: Headache,     Numbness, Seizures, Other      Psychiatric:  Denies: Anxiety, Appropriate Effect, Depression, Other      Sleep:  No: Excessive daytime sleep, Morning Headache?, Snoring, Insomnia?, Stop    breathing at sleep?, Other      Integumentary:  Denies: Rash, Dry skin, Skin Warm to Touch, Other            FAMILY/SOCIAL/MEDICAL HX      Current History      Lives alone. Never smoked. No alcohol. No illicit drugs use.       Used to work as a farmer.      No pets or birds at home.      Surgical History:  Yes: Cholecystectomy, Eye Surgery, Head Surgery (BILATERAL     CATARACTS REMOVED), Hernia Surgery, Orthopedic Surgery (RIGHT ELBOW BURSECTOMY);    No: AAA Repair, Abdominal Surgery, Angioplasty, Appendectomy, Back Surgery,     Bladder Surgery, Bowel Surgery, Breast Surgery, CABG, Carotid Stenosis, Ear     Surgery, Kidney Surgery, Nose Surgery, Oral Surgery,  Prostatectomy, Rectal     Surgery, Spinal Surgery, Testicular Surgery, Throat Surgery, Valve Replacement,     Vascular Surgery, Other Surgeries      Diabetes - Family Hx:  Child      Is Father Still Living?:  No      Is Mother Still Living?:  No       Family History:  Yes      Social History:  No Tobacco Use, No Alcohol Use, No Recreational Drug use      Smoking status:  Never smoker      Occupation:  farmer      Anticoagulation Therapy:  No      Antibiotic Prophylaxis:  No      Medical History:  Yes: Arthritis (GENERALIZED), Chemotherapy/Cancer (SKIN CANCER    REMOVED EAR AND BACK AREA), Chronic Bronchitis/COPD, Hemorrhoids/Rectal Prob     (ACID REFLUX), Hiatal Hernia, High Blood Pressure, Shortness Of Breath (HX OF     EMPHYSEMA, BRONCHITIS), Miscellaneous Medical/oth (RESTLESS LEG SYNDROME); No:     Alcoholism, Allergies, Anemia, Asthma, Blood Disease, Broken Bones, Cataracts,     Chemical Dependency, Emphysema, Chronic Liver Disease, Colon Trouble, Colitis,     Diverticulitis, Congestive Heart Failu, Deafness or Ringing Ears, Convulsions,     Depression, Anxiety, Bipolar Disorder, Diabetes, Epilepsy, Seizures,     Forgetfullness, Glaucoma, Gall Stones, Gout, Head Injury, Heart Attack, Heart     Murmur, Hepatitis, High Cholesterol, HIV (Do not ask - volu, Jaundice, Kidney or    Bladder Disease, Kidney Stones, Migrane Headaches, Mitral Valve Prolapse, Night     sweats, Phlebitis, Psychiatric Care, Reflux Disease, Rheumatic Fever, Sexually     Transmitted Dis, Sinus Trouble, Skin Disease/Psoriais/Ecz, Stroke, Thyroid     Problem, Tuberculosis or Pos TB Te      Psychiatric History      none            PREVENTION      Hx Influenza Vaccination:  Yes      Date Influenza Vaccine Given:  Oct 1, 2019      Influenza Vaccine Declined:  No      2 or More Falls Past Year?:  No      Fall Past Year with Injury?:  No      Hx Pneumococcal Vaccination:  Yes      Encouraged to follow-up with:  PCP regarding preventative exams.       Chart initiated by      Rylie Matt            ALLERGIES/MEDICATIONS      Allergies:        Coded Allergies:             LEVOFLOXACIN (Verified  Allergy, Unknown, NAUSEA/VOMITING, 12/9/19)                  PT. STATED HE CAN TAKE IT IV THOUGH, NO REACTION      Uncoded Allergies:             IV DYE (Allergy, Unknown, EYES SWOLLEN FOREHEAD RASH, 3/2/18)      Medications    Last Reconciled on 12/9/19 12:29 by BLU DELGADO MD      Tiotropium Bromide (Spiriva Respimat 2.5 mcg/Puff) 4 Gm Mist.inhal      2 PUFFS INH RTQDAY, #1 MDI 9 Refills         Prov: Blu Delgado         12/9/19       MDI-Advair 250/50 (Advair 250/50 Diskus) 1 Each Blst.w.dev      1 PUFF INH BID for 90 Days, #3 INH 4 Refills         Prov: Blu Delgado         12/9/19       Nathan-Fluticasone (Fluticasone 50 mcg) 16 Gm Spray.susp      2 PUFFS NARE EACH QDAY, #1 BOTTLE 6 Refills         Prov: Blu Delgado         12/9/19       MDI-Albuterol (Ventolin HFA) 8 Gm Hfa.aer.ad      2 PUFFS INH Q4-6H PRN for DYSPNEA, #3 INH 3 Refills         Prov: Blu Delgado         12/9/19       amLODIPine (amLODIPine) 5 Mg Tablet      5 MG PO BID, #60 TAB         Reported         12/9/19       Furosemide* (Lasix*) 20 Mg Tablet      20 MG PO QDAY, #30 TAB 0 Refills         Reported         7/18/19       Finasteride (Finasteride*) 5 Mg Tablet      5 MG PO QDAY, #30 TAB 0 Refills         Reported         7/18/19       Aclidinium Bromide (Tudorza) 400 Mcg Inh      1 PUFF INH RTBID, #1 MDI 0 Refills         Reported         6/21/18       (B12)   No Conflict Check      2500 UNITS PO QDAY         Reported         3/2/18       (Eye Vitamin)   No Conflict Check      QDAY         Reported         3/2/18       Lansoprazole (Prevacid) 15 Mg Capsule.dr      15 MG PO QDAY, CAP         Reported         12/7/17       Meclizine Hcl (Meclizine*) 25 Mg Tablet      25 MG PO BID, #60 TAB 0 Refills         Reported         7/1/15       DULoxetine (Cymbalta) 60 Mg Capsule      60 MG PO QDAY, #30 CAP  0 Refills         Reported         11/3/14       Multivitamins (Multi-Day Vitamin) 1 Tab Tablet      1 TAB PO QDAY, TAB         Reported         11/7/13       Tamsulosin HCL (Flomax) 0.4 Mg Cap.sr.24h      0.4 MG PO BID, CAP         Reported         11/7/13       Cholecalciferol (Vitamin D3) (Vitamin D3) 1,000 Unit Tab      2000 UNITS PO QDAY, TAB         Reported         11/4/13       Gabapentin (Gabapentin) 300 Mg Capsule      300 MG PO QDAY         Reported         1/16/12      Current Medications      Current Medications Reviewed 12/9/19            EXAM      CONSTITUTIONAL: Pleasant elderly male in no acute distress.  Normal conversant.           EYES : Pink conjunctive, no ptosis, PERRL       ENMT : Dentures are present, Mallampati classification II, mild posterior     pharyngeal wall erythema, no sinus tenderness.   Nose and ears appear normal.      There is thrush present.        Neck: Nontender, no masses, no thyromegaly, no nodules      Resp : Diminished breath sounds bilaterally, resonant to percussion bilaterally,    no wheezing, rhonchi or crackles.        CVS  : No carotid bruits, s1s2 nl, RRR, no murmur, rubs or gallop, no peripheral    edema       Chest wall: Normal rise with inspiration, nontender on palpation      GI   : Abdomen soft, with no masses, no hepatosplenomegaly, no hernias, BS+      MSK  : Normal gait and station, no digital cyanosis or clubbing       Skin : No rashes, ulcerations or lesions, normal turgor and temperature      Neuro: CN II - XII intact, no sensory deficits, DTRs intact and symmetrical, no     motor weakness      Psych: Appropriate affect, A   Vitals      Vitals:             Height 5 ft 9 in / 175.26 cm           Weight 161 lbs 8 oz / 73.385505 kg           BSA 1.89 m2           BMI 23.8 kg/m2           Temperature 97.9 F / 36.61 C - Oral           Pulse 70           Respirations 16           Blood Pressure 126/74 Sitting, Left Arm           Pulse Oximetry 96%, room air             REVIEW      Results Reviewed      PCCS Results Reviewed?:  Yes Prev Lab Results, Yes Prev Radiology Results, Yes     Previous Mecial Records      Radiographic Results               [facility]                PACS RADIOLOGY REPORT            Patient: MARCIO HERNANDEZ   Acct: #[id number]   Report: #[id number]            UNIT #: [id number]    DOS: 18 1143      INSURANCE:MEDICARE PART A   LOCATION:[facility]     : 1932            PROVIDERS      ADMITTING:     ATTENDING: RAYA Orantes      FAMILY:  GILAMICHAEL   ORDERING:  RAYA Orantes         OTHER:    DICTATING:  Garrett Munoz MD            REQ #:[id number]   EXAM:CXR2 - CHEST 2V AP PA LAT      REASON FOR EXAM:  HOARSNESS, COPD, GERD      REASON FOR VISIT:  HOARSNESS, COPD, GERD            *******Signed******         PROCEDURE:   CHEST AP/PA AND LATERAL             COMPARISON:   [facility], , CHEST AP/PA 1 VIEW, 2013,     22:53.             INDICATIONS:   HORSENESS, COPD, GERD. PRE-OPERATIVE FOR QUAD SCOPE ON FRIDAY             FINDINGS:         Borderline heart size.  Mediastinal structures appear stable.  Thoracic aortic     ectasia.  There are       coarse bibasilar interstitial opacities.  No superimposed acute airspace disease    visualized.  Soft       tissues and skeleton intact.Endplate degenerative arthropathy of the thoracic     spine and multilevel       disc space narrowing.  Osteopenia              CONCLUSION:   No acute disease.  No significant change has occurred.                Garrett Munoz M.D.             Electronically Signed and Approved By: Garrett Munoz M.D. on 2018 at     12:02                        Until signed, this is an unconfirmed preliminary report that may contain      errors and is subject to change.                                              RAMSR:      D:18 1202      PFT Results      Patient: MARCIO HERNANDEZ    Acct: #[id number]   Report: #[id number]            UNIT #: [id number]    DOS:       LOCATION:[facility]     : 1932            PROVIDERS      ADMITTING:     FAMILY:  MD NONE         OTHER:       DICTATING:  Blu Delgado MD            REASON FOR VISIT:  COPD            *******Signed******                                    [facility]                          [employer]                            [place], [place]  [address]               __________________________________________________________________________             Patient Name:                   Attending Physician:      Wesley Cunningham M.D.             Patient Visit # MR #            Admit Date  Disch Date     Location      [id number]    [id number]      2019                 [facility]- -             Date of Birth      1932      __________________________________________________________________________      821 - DIAGNOSTIC REPORT             PULMONARY FUNCTION TEST             SPIROMETRY:      Spirometry shows moderate obstructive defect.      FEV1/FVC ratio is 51%.      FEV1 is 1.62 L, 66% of predicted.      FVC is 3.16 L, 89% of predicted.             BRONCHODILATOR RESPONSE:      There is no significant response to bronchodilator administration.             LUNG VOLUMES:      Lung volumes show hyperinflation and air trapping.      Total lung capacity is 9.43 L, 137% of predicted.      Residual volume is 6.26 L, 227% of predicted.             DIFFUSION:      Diffusion capacity is normal, 99% of predicted.             FLOW VOLUME LOOP:      Flow volume loop is suggestive of obstructive process.             CONCLUSION:      Low FEV1/FVC with low FEV1, normal FVC with air trapping and hyperinflation      and normal diffusion capacity.  It suggest moderate obstructive airway      disease, likely chronic bronchitis phenotype.  Please correlate clinically.             To be  electronically signed in [facility]      [id number] BLU DELGADO M.D.             NK:aw      D:  07/02/2019 17:44      T:  07/02/2019 18:21      #[id number]             Until signed, this is an unconfirmed preliminary report that may contain      errors and is subject to change.                   Until signed, this is an unconfirmed preliminary report that may contain      errors and is subject to change.                     <Electronically signed by Blu Delgado MD>                07/08/19 1018               Blu Delgado MD:KEITH      D:07/02/19 1744            Assessment      Notes      New Medications      * amLODIPine 5 MG TABLET: 5 MG PO BID #60      * TIOTROPIUM BROMIDE (Spiriva Respimat 2.5 mcg/Puff) 4 GM MIST.INHAL: 2 PUFFS       INH RTQDAY #1      * Aclidinium Bromide (Tudorza) 400 MCG INH: 1 PUFF INH BID #1         Dx: COPD (chronic obstructive pulmonary disease) - J44.9      Renewed Medications      * MDI-Albuterol (Ventolin HFA) 8 GM HFA.AER.AD: 2 PUFFS INH Q4-6H PRN DYSPNEA #3      * Nathan-Fluticasone (Fluticasone 50 mcg) 16 GM SPRAY.SUSP: 2 PUFFS NARE EACH QDAY       #1      * MDI-Advair 250/50 (Advair 250/50 Diskus) 1 EACH BLST.W.DEV: 1 PUFF INH BID 90       Days #3      Discontinued Medications      * Aclidinium Bromide (Tudorza) 400 MCG INH: 1 PUFF INH RTBID #3      PLAN:  The patient is an 87 year old male with moderate COPD and recurrent     bronchitis. He also has a afib pacer.        1.  Moderate COPD. Continue with Advair. Given that Spiriva cost is high, I have    given him a sample of Tudorza.  I will send the order for Spiriva again to see     if it would be covered.        2. Continue with Flonase, Lasix and Flomax.        3.  Use albuterol as needed.      4. He is up-to-date on vaccinations.      5. He already had a flu shot this year.      6. Follow up with him in six months, earlier if needed.            Patient Education       Education resources provided:  Yes      Patient Education Provided:  Acute Bronchitis            Electronically signed by Blu Delgado  12/26/2019 20:40       Disclaimer: Converted document may not contain table formatting or lab diagrams. Please see [facility] for the authenticated document.

## 2021-05-28 NOTE — PROGRESS NOTES
Patient: MARCIO HRENANDEZ     [id number]     Report: #[id number]  UNIT #: [id number]     : 1932    Encounter Date:2020  PRIMARY CARE: CHEYENNE SMITH  ***Signed***  --------------------------------------------------------------------------------------------------------------------  Chief Complaint      Encounter Date      Aug 19, 2020            Primary Care Provider      CHEYENNE SMITH            Referring Provider      MICHAEL URIOSTEGUI            Patient Complaint      Patient is complaining of      soa follow up            VITALS      Height 5 ft 9.00 in / 175.26 cm      Weight 164 lbs  / 74.225223 kg      BSA 1.90 m2      BMI 24.2 kg/m2      Temperature 98.5 F / 36.94 C - Tympanic      Pulse 75      Respirations 18      Blood Pressure 126/80 Sitting, Right Arm      Pulse Oximetry 88%, room air      Initial Exhaled Nitrous Oxide      Exhaled Nitrous Oxide Results:  9            HPI      The patient is an 88 year old male with a history of moderate COPD, recurrent     bronchitis, worsening cough, shortness of breath and lack of energy. Since his     last office visit he has been on Tudorza.  He was on Symbicort, but his PCP     switched it two weeks ago because of the coverage issues with his insurance.  He    is using them now, but has been having progressively worsening cough and     shortness of breath.  He is not able to as active as possible. His oxygen was at    88 and 89% at rest while he was in the office. He is not eager to get oxygen     right away. He is short of breath with activities. He has leg swelling that gets    worse in the evening time. He has no chest pain, chest tightness, nausea, vo    miting, fever or chills.            ROS      Constitutional:  Complains of: Fatigue; Denies: Fever, Weight gain, Weight loss,    Chills, Insomnia, Other      Respiratory/Breathing:  Complains of: Shortness of air, Wheezing; Denies: Cough,    Hemoptysis, Pleuritic pain, Other      Endocrine:  Denies:  Polydipsia, Polyuria, Heat/cold intolerance, Diabetes, Other      Eyes:  Denies: Blurred vision, Vision Changes, Other      Ears, nose, mouth, throat:  Denies: Mouth lesions, Thrush, Throat pain,     Hoarseness, Allergies/Hay Fever, Post Nasal Drip, Headaches, Recent Head Injury,    Nose Bleeding, Neck Stiffness, Thyroid Mass, Hearing Loss, Ear Fullness, Dry     Mouth, Nasal or Sinus Pain, Dry Lips, Nasal discharge, Nasal congestion, Other      Cardiovascular:  Denies: Palpitations, Syncope, Claudication, Chest Pain, Wake     up Gasping for air, Leg Swelling, Irregular Heart Rate, Cyanosis, Dyspnea on     Exertion, Other      Gastrointestinal:  Denies: Nausea, Constipation, Diarrhea, Abdominal pain,     Vomiting, Difficulty Swallowing, Reflux/Heartburn, Dysphagia, Jaundice,     Bloating, Melena, Bloody stools, Other      Genitourinary:  Denies: Urinary frequency, Incontinence, Hematuria, Urgency,     Nocturia, Dysuria, Testicular problems, Other      Musculoskeletal:  Denies: Joint Pain, Joint Stiffness, Joint Swelling, Myalgias,    Other      Hematologic/lymphatic:  DENIES: Lymphadenopathy, Bruising, Bleeding tendencies,     Other      Neurological:  Denies: Headache, Numbness, Weakness, Seizures, Other      Psychiatric:  Denies: Anxiety, Appropriate Effect, Depression, Other      Sleep:  No: Excessive daytime sleep, Morning Headache?, Snoring, Insomnia?, Stop    breathing at sleep?, Other      Integumentary:  Denies: Rash, Dry skin, Skin Warm to Touch, Other      Immunologic/Allergic:  Denies: Latex allergy, Seasonal allergies, Asthma,     Urticaria, Eczema, Other      Immunization status:  No: Up to date            FAMILY/SOCIAL/MEDICAL HX      Current History      Lives alone. Never smoked. No alcohol. No illicit drugs use.       Used to work as a farmer.      No pets or birds at home.      Surgical History:  Yes: Cholecystectomy, Eye Surgery, Head Surgery (BILATERAL C    ATARACTS REMOVED), Hernia Surgery,  Orthopedic Surgery (RIGHT ELBOW BURSECTOMY);     No: AAA Repair, Abdominal Surgery, Angioplasty, Appendectomy, Back Surgery,     Bladder Surgery, Bowel Surgery, Breast Surgery, CABG, Carotid Stenosis, Ear     Surgery, Kidney Surgery, Nose Surgery, Oral Surgery, Prostatectomy, Rectal     Surgery, Spinal Surgery, Testicular Surgery, Throat Surgery, Valve Replacement,     Vascular Surgery, Other Surgeries      Diabetes - Family Hx:  Child      Is Father Still Living?:  No      Is Mother Still Living?:  No       Family History:  Yes      Social History:  No Tobacco Use, No Alcohol Use, No Recreational Drug use      Smoking status:  Never smoker      Occupation:  farmer      Anticoagulation Therapy:  No      Antibiotic Prophylaxis:  No      Medical History:  Yes: Arthritis (GENERALIZED), Chemotherapy/Cancer (SKIN CANCER    REMOVED EAR AND BACK AREA), Chronic Bronchitis/COPD, Hemorrhoids/Rectal Prob     (ACID REFLUX), Hiatal Hernia, High Blood Pressure, Shortness Of Breath (HX OF     EMPHYSEMA, BRONCHITIS), Miscellaneous Medical/oth (RESTLESS LEG SYNDROME); No:     Alcoholism, Allergies, Anemia, Asthma, Blood Disease, Broken Bones, Cataracts,     Chemical Dependency, Emphysema, Chronic Liver Disease, Colon Trouble, Colitis,     Diverticulitis, Congestive Heart Failu, Deafness or Ringing Ears, Convulsions,     Depression, Anxiety, Bipolar Disorder, Diabetes, Epilepsy, Seizures,     Forgetfullness, Glaucoma, Gall Stones, Gout, Head Injury, Heart Attack, Heart     Murmur, Hepatitis, High Cholesterol, HIV (Do not ask - volu, Jaundice, Kidney or    Bladder Disease, Kidney Stones, Migrane Headaches, Mitral Valve Prolapse, Night     sweats, Phlebitis, Psychiatric Care, Reflux Disease, Rheumatic Fever, Sexually     Transmitted Dis, Sinus Trouble, Skin Disease/Psoriais/Ecz, Stroke, Thyroid     Problem, Tuberculosis or Pos TB Te      Psychiatric History      none            PREVENTION      Hx Influenza Vaccination:  Yes      Date  Influenza Vaccine Given:  Oct 1, 2019      Influenza Vaccine Declined:  No      2 or More Falls in Past Year?:  No      Fall Past Year with Injury?:  No      Hx Pneumococcal Vaccination:  Yes      Encouraged to follow-up with:  PCP regarding preventative exams.      Chart initiated by      laura hickman ma            ALLERGIES/MEDICATIONS      Allergies:        Coded Allergies:             LEVOFLOXACIN (Verified  Allergy, Unknown, NAUSEA/VOMITING, 8/19/20)                  PT. STATED HE CAN TAKE IT IV THOUGH, NO REACTION      Uncoded Allergies:             IV DYE (Allergy, Unknown, EYES SWOLLEN FOREHEAD RASH, 3/2/18)      Medications    Last Reconciled on 8/19/20 13:04 by BLU DELGADO MD      predniSONE (Deltasone) 10 Mg Tablet      10 MG PO ASDIR, #45 TAB 0 Refills         Prov: Blu Delgado         8/19/20       MDI-Albuterol (Ventolin HFA) 8 Gm Hfa.aer.ad      2 PUFFS INH Q4-6H PRN for DYSPNEA, #1 INH 6 Refills         Prov: Blu Delgado         8/19/20       Furosemide (Furosemide) 20 Mg Tablet      20 MG PO BID@09,17, #60 TAB 0 Refills         Prov: Blu Delgado         8/19/20       Meloxicam (Meloxicam*) 7.5 Mg Tablet      15 MG PO QDAY, #60 TAB 0 Refills         Reported         8/19/20       Venlafaxine HCl (Venlafaxine*) 75 Mg Tablet      75 MG PO BID, TAB         Reported         8/19/20       Fluticasone-Salmeterol 250-50 (Wixela 250-50 Inhub) 1 Each Blst.w.dev      1 PUFF INH BID for 30 Days, #1 INH         Reported         8/19/20       Aclidinium Bromide (Tudorza) 400 Mcg Inh               Prov: Blu Delgado         6/16/20       Omeprazole (Omeprazole*) 40 Mg Capsule      40 MG PO HS, #30 CAP 0 Refills         Reported         6/16/20       Nathan-Fluticasone (Fluticasone 50 mcg) 16 Gm Spray.susp      2 PUFFS NARE EACH QDAY, #1 BOTTLE 6 Refills         Prov: Blu Delgado         12/9/19       MDI-Albuterol (Ventolin HFA) 8 Gm Hfa.aer.ad      2 PUFFS INH Q4-6H PRN for DYSPNEA, #3 INH 3 Refills          Prov: Blu Delgado         12/9/19       amLODIPine (amLODIPine) 5 Mg Tablet      5 MG PO BID, #60 TAB         Reported         12/9/19       Finasteride (Finasteride*) 5 Mg Tablet      5 MG PO QDAY, #30 TAB 0 Refills         Reported         7/18/19       (B12)   No Conflict Check      2500 UNITS PO QDAY         Reported         3/2/18       (Eye Vitamin)   No Conflict Check      QDAY         Reported         3/2/18       Meclizine Hcl (Meclizine*) 25 Mg Tablet      25 MG PO BID, #60 TAB 0 Refills         Reported         7/1/15       Multivitamins (Multi-Day Vitamin) 1 Tab Tablet      1 TAB PO QDAY, TAB         Reported         11/7/13       Tamsulosin HCL (Flomax) 0.4 Mg Cap.sr.24h      0.4 MG PO BID, CAP         Reported         11/7/13       Cholecalciferol (Vitamin D3) (Vitamin D3) 1,000 Unit Tab      2000 UNITS PO QDAY, TAB         Reported         11/4/13      Current Medications      Current Medications Reviewed 8/19/20            EXAM      CONSTITUTIONAL: Pleasant elderly male in mild respiratory distress, has     conversational dyspnea.        EYES : Pink conjunctive, no ptosis, PERRL.       ENMT : Nose and ears appear normal, normal dentition, mild posterior pharyngeal     wall erythema, no sinus tenderness. Mallampati classification 2.        Neck: Nontender, no masses, no thyromegaly, no nodules.      Resp : Bilateral scattered rhonchi, diminished breath sounds, dull to percussion    at bases, no wheezing, no crackles, resonant to percussion bilaterally.      CVS  : No carotid bruits, s1s2 nl, RRR, no murmur, rubs or gallop, no peripheral    edema       Chest wall: Normal rise with inspiration, nontender on palpation.      GI   : Abdomen soft, with no masses, no hepatosplenomegaly, no hernias, BS+      MSK  : Normal gait and station, no digital cyanosis or clubbing       Skin : No rashes, ulcerations or lesions, normal turgor and temperature      Neuro: CN II - XII intact, no sensory deficits, DTRs  intact and symmetrical, no     motor weakness      Psych: Appropriate affect, A   Extremities: Trace pedal edema, nontender to palpation.      Vtials      Vitals:             Height 5 ft 9.00 in / 175.26 cm           Weight 164 lbs  / 74.129843 kg           BSA 1.90 m2           BMI 24.2 kg/m2           Temperature 98.5 F / 36.94 C - Tympanic           Pulse 75           Respirations 18           Blood Pressure 126/80 Sitting, Right Arm           Pulse Oximetry 88%, room air            REVIEW      Results Reviewed      PCCS Results Reviewed?:  Yes Prev Lab Results, Yes Prev Radiology Results, Yes     Previous Mecial Records      Radiographic Results               [facility] Diagnostic Img                PACS RADIOLOGY REPORT            Patient: MARCIO HERNANDEZ   Acct: #[id number]   Report: #[id number]            UNIT #: [id number]    DOS: 20 1345      INSURANCE:MEDICARE PART A   LOCATION:[facility]     : 1932            PROVIDERS      ADMITTING:     ATTENDING: Blu Delgado      FAMILY:  NONE,MD   ORDERING:  Blu Delgado         OTHER: CHEYENNE SMITH   DICTATING:  MAJOR CAMERON MD            REQ #:[id number]   [facility]      REASON FOR EXAM:        REASON FOR VISIT:  COUGH            *******Signed******         PROCEDURE:   CT CHEST WITHOUT CONTRAST             COMPARISON:   None.             INDICATIONS:   PATIENT STATES POSS PNEUMONIA, SOA             TECHNIQUE:   CT images were created without the administration of contrast     material.               PROTOCOL:     Standard imaging protocol performed                RADIATION:     DLP: 447.3mGy*cm          Automated exposure control was utilized to minimize radiation dose.              FINDINGS:         Patchy bilateral pulmonary nodules, some of which have tree-in-bud     configuration.  An index nodule       in the posterior left upper lobe measures 7 mm (image 26).  No dense      consolidation.  Linear       opacities in the lung bases, likely atelectasis or scarring.  Small bilateral     pleural effusions.        Cardiomegaly.  Coronary artery calcification.  No adenopathy in the chest.             Small hiatal hernia.  No acute findings in the included upper abdomen.  No     aggressive appearing       bone change.             CONCLUSION:   Patchy bilateral pulmonary nodules, favored to represent     infectious or inflammatory       change.  Recommend a follow-up chest CT in 3-6 months.             No dense consolidation.             Small bilateral pleural effusions.              MAJOR CAMERON MD             Electronically Signed and Approved By: MAJOR CAMERON MD on 2020 at 13:01                        Until signed, this is an unconfirmed preliminary report that may contain      errors and is subject to change.                                              DOWER:      D:20 1301      PFT Results      Patient: DAVIDWESLEY BURNS   Acct: #[id number]   Report: #[id number]            UNIT #: [id number]    DOS:       LOCATION:[facility]     : 1932            PROVIDERS      ADMITTING:     FAMILY:  MD NONE         OTHER:       DICTATING:  Blu Delgado MD            REASON FOR VISIT:  COPD            *******Signed******                                    [facility]                          [employer]                            [place], [place]  [address]               __________________________________________________________________________             Patient Name:                   Attending Physician:      Wesley Cunningham M.D.             Patient Visit # MR #            Admit Date  Disch Date     Location      [id number]    [id number]      2019                 [facility]- -             Date of Birth      1932      __________________________________________________________________________      821 -  DIAGNOSTIC REPORT             PULMONARY FUNCTION TEST             SPIROMETRY:      Spirometry shows moderate obstructive defect.      FEV1/FVC ratio is 51%.      FEV1 is 1.62 L, 66% of predicted.      FVC is 3.16 L, 89% of predicted.             BRONCHODILATOR RESPONSE:      There is no significant response to bronchodilator administration.             LUNG VOLUMES:      Lung volumes show hyperinflation and air trapping.      Total lung capacity is 9.43 L, 137% of predicted.      Residual volume is 6.26 L, 227% of predicted.             DIFFUSION:      Diffusion capacity is normal, 99% of predicted.             FLOW VOLUME LOOP:      Flow volume loop is suggestive of obstructive process.             CONCLUSION:      Low FEV1/FVC with low FEV1, normal FVC with air trapping and hyperinflation      and normal diffusion capacity.  It suggest moderate obstructive airway      disease, likely chronic bronchitis phenotype.  Please correlate clinically.             To be electronically signed in [employer]      [id number] BLU DELGADO M.D.             NK:bar      D:  07/02/2019 17:44      T:  07/02/2019 18:21      #[id number]             Until signed, this is an unconfirmed preliminary report that may contain      errors and is subject to change.                   Until signed, this is an unconfirmed preliminary report that may contain      errors and is subject to change.                     <Electronically signed by Blu Delgado MD>                07/08/19 1018               Blu Delgado MD:KEITH      D:07/02/19 1744            Assessment      Chronic cough - R05            Notes      New Medications      * Fluticasone-Salmeterol 250-50 (Wixela 250-50 Inhub) 1 EACH BLST.W.DEV: 1 PUFF       INH BID 30 Days #1      * Venlafaxine HCl (Venlafaxine*) 75 MG TABLET: 75 MG PO BID      * Meloxicam (Meloxicam*) 7.5 MG TABLET: 15 MG PO QDAY #60      * Furosemide 20 MG TABLET:  20 MG PO BID@09,17 #60      * MDI-Albuterol (Ventolin HFA) 8 GM HFA.AER.AD: 2 PUFFS INH Q4-6H PRN DYSPNEA #1      * predniSONE (Deltasone) 10 MG TABLET: 10 MG PO ASDIR #45         Instructions: 93lty3n,17ton6l,27uwx9x,93nef2z,90wte9z      Discontinued Medications      * Budesonide/Formoterol Fumarate (Symbicort 160/4.5 Mcg) 10.2 GM INH          Sample - Qty 2      * Furosemide 20 MG TABLET: 20 MG PO QDAY #30      * AMOXICILLIN/CLAVULANATE K (Augmentin 875/125 Mg) 1 EACH TABLET: 875 MG PO BID       #20      New Diagnostics      * Chest 2 View, Week         Dx: Chronic cough - R05      * [facility] Pre-Op Covid Screening, Routine         Dx: ENCOUNTER FOR SCREENING FOR OTHER VIRAL DISEASES - Z11.59      PLAN:        The patient is an 88 year old male with history of moderate COPD and recurrent     bronchitis with persistent cough, shortness of breath and lack of energy.  CT     scan is concerning for possible indolent infection.      1. Persistent cough and possible pneumonia.  I discussed with him regarding     bronchoscopy with airway inspection and bronchoalveolar lavage. The patient     understands the risks and benefits.  The risks including pneumonia,     pneumothorax, bleeding, respiratory depression and that it could be fatal were     all reviewed.  He is agreeable for procedure.  Alternatives and options were     discussed as well.      2.  I will schedule him for bronchoscopy next week.        3.  I will check a chest x-ray now.        4. I will start him on furosemide 20 mg twice a day as well.      5. I will refill his Ventolin.      6. I will also put him on prednisone for now.      7. Follow up in a few weeks with either RICHIE Stanton or KATHERINE Renee, earlier if needed.            Patient Education      Education resources provided:  Yes      Patient Education Provided:  Acute Bronchitis            Procedure Orders      Get Consent signed for:        Bronchoscopy with bronchoalveolar lavage,  transbronchial biopsies,      bronchial washings, bronchial brushings and airway inspection.      Risks and Benefits:        Risks and benefits were discussed in detail. The patient understands the      risks including pneumothorax, pneumonia, respiratory depression, bleeding      and that it could be fatal as well. The patient is agreeable for the      procedure. Alternatives and options were also discussed. The patient is      needs to be NPO for the procedure.            Electronically signed by Blu Delgado  08/24/2020 11:59       Disclaimer: Converted document may not contain table formatting or lab diagrams. Please see [employer] for the authenticated document.

## 2021-05-28 NOTE — PROGRESS NOTES
Patient: WESLEY CUNNINGHAM     Acct: [id number]     Report: #[id number]  UNIT #: [id number]     : 1932    Encounter Date:12/15/2020  PRIMARY CARE: CHEYENNE SMITH  ***Signed***  --------------------------------------------------------------------------------------------------------------------  History of Present Illness            Chief Complaint: 6 week follow up, results            Wesley Cunningham is presenting for evaluation via Telehealth visit. Verbal     consent obtained before beginning visit.            PAST MEDICAL HISTORY/OVERVIEW OF PATIENT SYMPTOMS            Hx: COPD, Solitary Pulmonary Nodule, Pulmonary Mycobacterial Infection      Never smoked      Flu vaccine- UTD      Pneumonia vaccine- UTD            Complaints-  fatigue             Provider spent (25) minutes with the patient during telehealth visit.            The following staff were present during this visit: Dr. 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  PET scan shows focal radiopharmaceutical     accumulation corresponding to the cecum and ascending colon and an ill defined     abnormal possible soft tissue attenuation in the colon, unintentional weight     loss, nausea, vomiting, dyspnea, fatigue, afib and congestive heart failure,     history of recurrent bronchitis and tobacco abuse with cigarettes in remission.      The patient was started on triple antibiotic therapy with rifampin, ethambutol     and azithromycin Monday, Wednesday and Friday given his persistent symptoms with     growth of Mycobacterium nonchromogenicum.  He started taking the medication,     but continued to feel worse and have more symptoms. He has hoarseness of voice     and shortness of breath with minimal exertion. He has cough and is having scant     phlegm  production. He has some chills, does not have any fever, is not able to     move around. He frustrated that he is not getting better. He has a history of     afib and feels like he has not overall improved with his breathing even with     multiple workups through cardiology and the pulmonary service. He feels like     everyday he is progressing and is not able to move around much at home. Overall     his voice seems rough, is not able to communicate much and feels like even with     minimal conversation he is short of breath. I discussed with her extensively     regarding possible hospitalization and review of his treatment. He is agreeable.            Physical exam deferred due to TeleHealth visit.              The patient previously grew pseudomonas MRSA as well as mycobacterium     nonchromogenicum on the bronchoscopy from 2020.                       [facility]                PACS RADIOLOGY REPORT            Patient: MARCIO HERNANDEZ   Acct: #[id number]   Report: #[id number]            UNIT #: [id number]    DOS: 20 1300      INSURANCE:MEDICARE PART A   LOCATION:[facility]     : 1932            PROVIDERS      ADMITTING:     ATTENDING: ROMAIN PEREZ PA-C      FAMILY:  SARAHCHEYENNE   ORDERING:  ROMAIN PEREZ PA-C         OTHER: PITA LEWIS   DICTATING:  DENTON BARBOUR MD            REQ #:[id number]   EXAM:WO - CT CHEST without CONTRAST      REASON FOR EXAM:        REASON FOR VISIT:  PNEUMONIA            *******Signed******         PROCEDURE:   CT CHEST WITHOUT CONTRAST             COMPARISON:   [facility], CT, CHEST W/O CONTRAST,     2020, 12:45.             INDICATIONS:   FOLLOW UP PREVIOUS CT CHEST.             TECHNIQUE:   CT images were created without the administration of contrast     material.               PROTOCOL:     Standard imaging protocol performed                RADIATION:     DLP:  299.4mGy*cm          Automated exposure control was utilized to minimize radiation dose.              FINDINGS:         There are again multiple bilateral pulmonary nodules.  The only nodule of     concern is located in the       posterior segment of the right upper lobe on image 35 of series 204.  There is a     9 mm nodule which       was not definitely present on the previous study.  I would recommend further     evaluation with a       whole-body PET-CT exam.  The remaining nodules are felt to be secondary to a     chronic infectious or       inflammatory process.  There is bilateral apical calcified pleural/parenchymal     scarring.  Within       the both lower lobes, the right middle lobe, and the lingula, there is bronchial     wall thickening       with suggestion of bronchiectasis and intervening pulmonary scarring.  There     were small layering       pleural effusions on the previous study.  The patient still has a trace right     pleural effusion, but       the left pleural space is clear.  There are no enlarged mediastinal lymph nodes.      The hilar regions       are difficult to evaluate without contrast.  The heart is enlarged.  There are     coronary artery       atherosclerotic vascular calcifications.  The patient has a hiatal hernia.      There are partially       imaged round fluid density masses in both kidneys which likely represent cysts.      There are       postsurgical changes within the upper abdomen.  There are no suspicious     osteolytic or sclerotic       lesions in the bony structures.             CONTINUED ON NEXT PAGE...             CONCLUSION:         1. There is a new 9 mm nodule in the posterior segment of the right upper lobe.      I would recommend       a whole-body PET-CT exam for follow-up.      2. The remaining pulmonary nodules are felt to be unchanged and secondary to a     chronic infectious       or inflammatory process.      3. Bronchiectasis and bronchial wall  thickening as described.      4. Near complete resolution of the pleural effusions.  The patient still has a     trace right pleural       effusion.      5. Additional findings as noted above.              DENTON BARBOUR MD             Electronically Signed and Approved By: DENTON BARBOUR MD on 11/02/2020 at 14:19                               Until signed, this is an unconfirmed preliminary report that may contain      errors and is subject to change.                                              WORBR:      D:11/02/20 1419            Allergies and Medications      Allergies:        Coded Allergies:             MORPHINE (Verified  Allergy, Severe, confused, 12/15/20)           LEVOFLOXACIN (Verified  Allergy, Intermediate, nausea, 12/15/20)                  can take iv form      Uncoded Allergies:             IV DYE (Allergy, Unknown, EYES SWOLLEN FOREHEAD RASH, 3/2/18)      Medications    Last Reconciled on 12/15/20 17:26 by ELIAS BERG MD      Lactobacillus Rhamnosus  (Probiotic Digestive Care) 1 Each Capsule      1 EACH PO, CAP         Reported         11/24/20       Fluticasone (Flovent HFA) 44 Mcg Inhaler      2 PUFFS INH RTBID, INH         Reported         11/24/20       Aclidinium Bromide (Tudorza) 400 Mcg Inh      1 PUFF INH RTBID, #1 MDI 0 Refills         Reported         11/24/20       (Eye Vitamin )   No Conflict Check      PO HS         Reported         11/24/20       (Juvenon)   No Conflict Check      PO BID         Reported         11/24/20       Multivitamins (Multi-Vitamin) 1 Each Tablet      1 TAB PO QDAY, #30 TAB 0 Refills         Reported         11/24/20       Meloxicam (Meloxicam*) 15 Mg Tablet      15 MG PO QDAY, #30 TAB 0 Refills         Reported         11/24/20       Ethambutol (Ethambutol) 400 Mg Tablet      800 MG PO MOWEFR for 30 Days, #24 TAB 5 Refills         Prov: [facility] AKHTAR [facility]         11/9/20       rifAMPin (rifAMPin) 300 Mg Cap      600 MG PO MOWEFR for 30 Days, #12 CAP 5  Refills         [facility]         11/9/20       Azithromycin (Azithromycin) 500 Mg Tablet      500 MG PO MOWEFR for 30 Days, #12 TAB 5 Refills         [facility]         11/9/20       Tamsulosin HCL (Tamsulosin HCL) 0.4 Mg Capsule      0.4 MG PO QDAY, #30 CAP 0 Refills         Reported         9/15/20       Metoprolol Tartrate (Metoprolol Tartrate) 25 Mg Tablet      12.5 MG PO BID, #60 TAB 0 Refills         Reported         9/15/20       Furosemide* (Lasix*) 20 Mg Tablet      20 MG PO QDAY, #30 TAB 0 Refills         Reported         9/15/20       Aspirin EC (Aspirin EC) 81 Mg Tablet.      81 MG PO QDAY, #30 TAB.EC 0 Refills         Reported         9/1/20       Coenzyme Q10 (Co Q-10) 100 Mg Capsule      100 MG PO QDAY for 30 Days, #30 CAP         Reported         9/1/20       Apixaban (Eliquis) 5 Mg Tablet      5 MG PO BID for 30 Days, #60 TAB         Reported         9/1/20       Lisinopril* (Lisinopril*) 5 Mg Tablet      2.5 MG PO QDAY, #30 TAB 0 Refills         Reported         9/1/20       Calcium Carb/Vit D3 (CALCIUM 600-VIT D3 400 TABLET) 1 Each Tablet      1 EACH PO QDAY, #60 TAB 0 Refills         Reported         8/21/20       Finasteride (Finasteride*) 5 Mg Tablet      5 MG PO HS, #30 TAB 0 Refills         Reported         8/21/20       Omeprazole (Omeprazole*) 20 Mg Tablet.dr      20 MG PO QDAY, #30 CAP 0 Refills         Reported         8/21/20       Venlafaxine HCl XR (Venlafaxine XR*) 150 Mg Tab.er.24      150 MG PO QDAY, CAP         Reported         8/21/20       Meclizine Hcl (Meclizine*) 25 Mg Tablet      25 MG PO BID, #60 TAB 0 Refills         Reported         8/21/20       (B12)   No Conflict Check      2500 UNITS PO QDAY         Reported         3/2/18       Cholecalciferol (Vitamin D3) (Vitamin D3) 1,000 Unit Tab      2000 UNITS PO QDAY, TAB         Reported         11/4/13            Plan      Orders:  Phone Eval 21-30 mi [id number]      Instructions      *  Chronic conditions reviewed and taken in consideration for today's treatment       plan.      * Plan Of Care: ()      * Patient instructed to seek medical attention urgently for new or worsening       symptoms.      * Patient was educated/instructed on their diagnosis, treatment and medications       today.      * Recommend self monitoring. Instructions given.      * Recommend self quarantine for 14 days.      * Recommend self quarantine until without fever for 72 hours without using fever       reducing medications.      * Recommends over the counter medications for symptom management.            PLAN:  The patient is an 88 year old male with a history of persistent pneumonia     with MRSA pseudomonas klebsiella who was treated with doxycycline and Levaquin.     He also had AFB growing mycobacterium nonchromogenicum and continues to worse     with exertional dyspnea, hoarseness of voice and cough.      1.  Worsening dyspnea and cough, likely persistent pneumonia and bronchitis.      Given that he was treated with doxycycline and Levaquin for his MRSA and     pseudomonas and he might not have cleared the infection I will have him admitted     to the hospital to check a sputum culture.  I discussed the case with Dr. Abrahan Brand. I would consider starting him on broad spectrum antibiotics including IV     vancomycin and cefepime at this time.  Also start his rifampin, ethambutol and     azithromycin on Monday, Wednesday and Friday. He might have some component of     heart failure. He might need cardiology consultation while he in the hospital.      We will check chest x-ray and check NT-proBNP and follow up on that.  He will     need to be hospitalized for close monitoring for treatment and overall adjusting     his medications. Given his overall persistent and worsening symptoms now I     believe at home he is not able to do well.  I discussed the case with Dr. Kern as well as Dr. Brand who will get him  admitted and treat his worsening     clinical situation now.      2. Follow up in 3-6 weeks after hospital discharge.            Electronically signed by Blu Delgado  01/20/2021 12:31       Disclaimer: Converted document may not contain table formatting or lab diagrams. Please see [employer] for the authenticated document.

## 2021-05-28 NOTE — PROGRESS NOTES
Patient: MARCIO HERNANDEZ     [facility]: [id number]     Report: #[id number]  UNIT #: [id number]     : 1932    Encounter Date:2020  PRIMARY CARE: CHEYENNE SMITH  ***Signed***  --------------------------------------------------------------------------------------------------------------------  Chief Complaint      Encounter Date      2020            Primary Care Provider      CHEYENNE SMITH            Referring Provider      CHEYENNE SMITH            Patient Complaint      Patient is complaining of      PT here for F/U Pet Scan Results, COPD. Pneumonia            VITALS      Height 5 ft 8 in / 172.72 cm      Weight 151 lbs  / 68.943463 kg      BSA 1.81 m2      BMI 23.0 kg/m2      Temperature 97.7 F / 36.5 C - Temporal      Pulse 72      Respirations 15      Blood Pressure 100/67 Sitting, Left Arm      Pulse Oximetry 97%, room air            HPI      The patient is an 88 year old male patient of Dr. Delgado who was feeling poorly     after growing MRSA, klebsiella and pseudomonas on bronchial washings from     bronchoscopy that was performed on 2020. patient's wife and patient's     daughter are also in the office and also providing history.  The patient was     prescribed two courses of doxycycline and Levaquin and finished antibiotics.      Since last office visit, the patient's AFB grew mycobacterium nonchromogenicum.     The patient had a repeat chest CT scan that was ordered since last visit and     that was completed on 2020 and it showed a new 9 mm nodule in the     posterior segment of the right upper lobe recommending a whole body PET scan     exam for follow up.  The remaining pulmonary nodules were felt to be unchanged     and secondary to chronic infectious or inflammatory process and it also showed     some bronchiectasis and bronchial wall thickening and near complete resolution     of the pleural effusions.  A PET scan was subsequently ordered and that was     completed on 2020 and  it showed mild radiopharmaceutical accumulation     corresponding to nodules at the lateral aspect of the right upper lobe. There     are additional small nodules in this region as well as within the left upper     lobe without significant radiopharmaceutical accumulation. There are areas of     scarring noted.  Findings were most likely related to remote/chronic     inflammation or granulomatous inflammatory changes.  Changes were secondary to     early developing malignant nodules are felt less likely, recommending follow up     chest CT in three months to ensure stability.  PET scan did show apparent focal     radiopharmaceutical accumulation corresponding to the cecum and ascending colon.     There appears to be an ill defined abnormal possible soft tissue attenuation     involving the colon in his region suggesting colonic mass.  Findings may     indicate malignancy or adenocarcinoma of the colon.  There was no evidence for     additional focal abnormal radiopharmaceutical accumulation to indicate potential    additional malignancy or metastatic disease.  The patient states he still does     get short of air that is worse with exertion, moderate in severity and improved     with rest. The patient states at times he does have productive cough and     wheezing. The patient has unintentional weight loss of 8 pounds. The patient     states at times he does have night sweats where he does soak the sheets. The     patient denies any fever, chills, hemoptysis, chest pain, chest tightness,     swollen glands in the head and neck, abdominal pain, diarrhea, hemochetzia,     hematemesis, change in stool shape/pattern.  The patient states ever since he     had his hernia surgery several years ago sometimes when he eats food he will     vomit it back up. The patient denies any headaches, changes in sense of taste     and/or smell, sore throat or any other coronavirus or flu-like symptoms.  The     patient states he is under  the care of Dr. Espinosa and was started on Eliquis for    atrial fibrillation. The patient states he uses Advair everyday as prescribed     and uses albuterol inhaler and DuoNebs as needed.  The patient states he is able    to perform his ADLs.              I have personally reviewed the review of systems, past family, social, surgical     and medical histories and I agree with those as entered in the chart.      Copies To:   Blu Delgado      Constitutional:  Denies: Fatigue, Fever, Weight gain, Weight loss, Chills,     Insomnia, Other      Respiratory/Breathing:  Denies: Shortness of air, Wheezing, Cough, Hemoptysis,     Pleuritic pain, Other      Endocrine:  Denies: Polydipsia, Polyuria, Heat/cold intolerance, Diabetes, Other      Eyes:  Denies: Blurred vision, Vision Changes, Other      Ears, nose, mouth, throat:  Denies: Congestion, Dysphagia, Hearing Changes, Nose    Bleeding, Nasal Discharge, Throat pain, Tinnitus, Other      Cardiovascular:  Denies: Chest Pain, Exertional dyspnea, Peripheral Edema,     Palpitations, Syncope, Wake up Gasping for air, Orthopnea, Tachycardia, Other      Gastrointestinal:  Complains of: Nausea, Vomiting; Denies: Abdominal     pain/cramping, Bloody stools, Constipation, Diarrhea, Melena, Other      Genitourinary:  Denies: Dysuria, Urinary frequency, Incontinence, Hematuria,     Urgency, Other      Musculoskeletal:  Denies: Joint Pain, Joint Stiffness, Joint Swelling, Myalgias,    Other      Hematologic/lymphatic:  DENIES: Lymphadenopathy, Bruising, Bleeding tendencies,     Other      Neurologic:  Denies: Headache, Numbness, Weakness, Seizures, Other      Psychiatric:  Denies: Anxiety, Appropriate Effect, Depression, Other      Sleep:  No: Excessive daytime sleep, Morning Headache?, Snoring, Insomnia?, Stop    breathing at sleep?, Other      Integumentary:  Denies: Rash, Dry skin, Skin Warm to Touch, Other            FAMILY/SOCIAL/MEDICAL HX      Surgical History:   Yes: Bowel Surgery (COLONOSCOPY), Cholecystectomy, Eye     Surgery, Head Surgery (BILATERAL CATARACTS/LENS IMPLANTS), Hernia Surgery,     Orthopedic Surgery (RIGHT ELBOW BURSECTOMY ); No: AAA Repair, Abdominal Surgery,    Angioplasty, Appendectomy, Back Surgery, Bladder Surgery, Breast Surgery, CABG,     Carotid Stenosis, Ear Surgery, Kidney Surgery, Nose Surgery, Oral Surgery,     Prostatectomy, Rectal Surgery, Spinal Surgery, Testicular Surgery, Throat     Surgery, Valve Replacement, Vascular Surgery, Other Surgeries      Diabetes - Family Hx:  Child      Is Father Still Living?:  No      Is Mother Still Living?:  No      Social History:  No Tobacco Use, No Alcohol Use, No Recreational Drug use      Smoking status:  Never smoker      Anticoagulation Therapy:  No      Antibiotic Prophylaxis:  No      Medical History:  Yes: Arthritis (GENERALIZED), Chemotherapy/Cancer (SKIN CANCER    REMOVED EAR AND BACK AREA), Chronic Bronchitis/COPD (INHALERS), Seizures,     Hemorrhoids/Rectal Prob (ACID REFLUX), Hiatal Hernia, High Blood Pressure (ON     MEDS), Shortness Of Breath (HX OF EMPHYSEMA, BRONCHITIS), Miscellaneous     Medical/oth (RESTLESS LEG SYNDROME); No: Anemia, Asthma, Blood Disease, Broken     Bones, Cataracts, Chemical Dependency, Emphysema, Chronic Liver Disease, Colon     Trouble, Colitis, Diverticulitis, Congestive Heart Failu, Deafness or Ringing     Ears, Depression, Anxiety, Bipolar Disorder, Diabetes, Epilepsy, Glaucoma, Gall     Stones, Gout, Head Injury, Heart Attack, Heart Murmur, Hepatitis, HIV (Do not     ask - volu, Kidney or Bladder Disease, Kidney Stones, Migrane Headaches, Mitral     Valve Prolapse, Psychiatric Care, Reflux Disease, Rheumatic Fever, Sexually     Transmitted Dis, Sinus Trouble, Skin Disease/Psoriais/Ecz, Stroke, Thyroid     Problem, Tuberculosis or Pos TB Te      Psychiatric History      None            PREVENTION      Hx Influenza Vaccination:  Yes      Date Influenza Vaccine  Given:  Nov 1, 2020      Influenza Vaccine Declined:  No      2 or More Falls in Past Year?:  Yes      Fall Past Year with Injury?:  No      Hx Pneumococcal Vaccination:  Yes      Encouraged to follow-up with:  PCP regarding preventative exams.      Chart initiated by      Dionna Mahan CMA            ALLERGIES/MEDICATIONS      Allergies:        Uncoded Allergies:             IV DYE (Allergy, Unknown, EYES SWOLLEN FOREHEAD RASH, 3/2/18)      Medications    Last Reconciled on 11/9/20 16:21 by NEIDA AKHTAR       Ethambutol (Ethambutol) 400 Mg Tablet      800 MG PO MOWEFR for 30 Days, #24 TAB 5 Refills         Prov: NEIDA AKHTAR [facility]         11/9/20       rifAMPin (rifAMPin) 300 Mg Cap      600 MG PO MOWEFR for 30 Days, #12 CAP 5 Refills         [facility]         11/9/20       Azithromycin (Azithromycin) 500 Mg Tablet      500 MG PO MOWEFR for 30 Days, #12 TAB 5 Refills         Prov: NEIDA AKHTAR [facility]         11/9/20       Ubidecarenone (H2Q) 100 Mg Capsule      100 MG PO BID, CAP         Reported         9/15/20       Tamsulosin HCL (Tamsulosin HCL) 0.4 Mg Capsule      0.4 MG PO QDAY, #30 CAP 0 Refills         Reported         9/15/20       MDI-Advair 250/50 (Advair 250/50 Diskus) 1 Each Blst.w.dev      1 PUFF INH RTBID, #1 INH 0 Refills         Reported         9/15/20       Vits A,C,E/Lutein/Minerals (Ocuvite with Lutein Tablet) 1 Tab Tablet      1 TAB PO QDAY, TAB         Reported         9/15/20       Metoprolol Tartrate (Metoprolol Tartrate) 25 Mg Tablet      25 MG PO BID, #60 TAB 0 Refills         Reported         9/15/20       Lactobacillus Acidophilus (Florajen) 460 Mg Capsule      460 MG PO QDAY, CAP         Reported         9/15/20       Furosemide* (Lasix*) 20 Mg Tablet      20 MG PO QDAY, #30 TAB 0 Refills         Reported         9/15/20       Docusate Sodium (Docusate Sodium) 60 Mg/15 Ml Syrup      100 MG PO HS, ML         Reported         9/15/20        Albuterol/Ipratropium (Duoneb) 3 Ml Ampul.neb      3 ML INH Q4H PRN for SHORTNESS OF BREATH, #120 NEB 5 Refills         Prov: ROMAIN PEREZ PA-C         9/1/20       Oxygen (OXYGEN)  Gas      #2         Reported         9/1/20       Aspirin EC (Aspirin EC) 81 Mg Tablet.      81 MG PO QDAY, #30 TAB.EC 0 Refills         Reported         9/1/20       Coenzyme Q10 (Co Q-10) 100 Mg Capsule      100 MG PO QDAY for 30 Days, #30 CAP         Reported         9/1/20       Apixaban (Eliquis) 5 Mg Tablet      5 MG PO BID for 30 Days, #60 TAB         Reported         9/1/20       Lisinopril* (Lisinopril*) 5 Mg Tablet      5 MG PO QDAY, #30 TAB 0 Refills         Reported         9/1/20       Mirtazapine (Mirtazapine*) 15 Mg Tablet      15 MG PO QDAY, #30 TAB 0 Refills         Reported         8/21/20       Calcium Carb/Vit D3 (CALCIUM 600-VIT D3 400 TABLET) 1 Each Tablet      1 EACH PO QDAY, #60 TAB 0 Refills         Reported         8/21/20       Finasteride (Finasteride*) 5 Mg Tablet      5 MG PO HS, #30 TAB 0 Refills         Reported         8/21/20       Omeprazole (Omeprazole*) 20 Mg Tablet.      20 MG PO QDAY, #30 CAP 0 Refills         Reported         8/21/20       Venlafaxine HCl XR (Venlafaxine XR*) 150 Mg Tab.er.24      150 MG PO QDAY, CAP         Reported         8/21/20       Meclizine Hcl (Meclizine*) 25 Mg Tablet      25 MG PO BID, #60 TAB 0 Refills         Reported         8/21/20       MDI-Albuterol (Ventolin HFA) 8 Gm Hfa.aer.ad      2 PUFFS INH Q4-6H PRN for DYSPNEA, #1 INH 6 Refills         Prov: Blu Delgado         8/19/20       Meloxicam (Meloxicam*) 7.5 Mg Tablet      15 MG PO QDAY, #60 TAB 0 Refills         Reported         8/19/20       (B12)   No Conflict Check      2500 UNITS PO QDAY         Reported         3/2/18       Cholecalciferol (Vitamin D3) (Vitamin D3) 1,000 Unit Tab      2000 UNITS PO QDAY, TAB         Reported         11/4/13      Current Medications      Current Medications  Reviewed 20            EXAM      Vital Signs Reviewed.      General:  WDWN, Alert, NAD.      HEENT: PERRL, EOMI.  OP, nares clear, no sinus tenderness.      Neck: Supple, no JVD, no thyromegaly.      Lymph: No axillary, cervical, supraclavicular lymphadenopathy noted bilaterally.      Chest: Mildly decreased breath sounds throughout, no wheezes, rales or rhonchi     appreciated, normal work of breathing noted.  Patient is able to speak full     sentences without difficulty.        CV: RRR, no MGR, pulses 2+, equal.        Abd: Soft, NT, ND, +BS, no HSM.      EXT: No clubbing, no cyanosis, no edema, no joint tenderness.        Neuro:  A  Skin: No rashes or lesions.      Vitals      Vitals:             Height 5 ft 8 in / 172.72 cm           Weight 151 lbs  / 68.872717 kg           BSA 1.81 m2           BMI 23.0 kg/m2           Temperature 97.7 F / 36.5 C - Temporal           Pulse 72           Respirations 15           Blood Pressure 100/67 Sitting, Left Arm           Pulse Oximetry 97%, room air            REVIEW      Results Reviewed      [facility]S Results Reviewed?:  Yes Prev Lab Results, Yes Prev Radiology Results, Yes     [facility]      Lab Results      I reviewed patient's noncontrast chest CT dated for 2020.  I reviewed     patient's PET scan dated for 2020.  I reviewed Barb Renee last offi    ce visit note.      Radiographic Results               [facility]                PACS RADIOLOGY REPORT            Patient: MARCIO HERNANDEZ   Acct: #[id number]   Report: #[id number]            UNIT #: [id number]    DOS: 20 1300      INSURANCE:MEDICARE PART A   LOCATION:[facility]     : 1932            PROVIDERS      ADMITTING:     ATTENDING: ROMAIN PEREZ PA-C      FAMILY:  SARAH,CHEYENNE   ORDERING:  ROMAIN PEREZ PA-C         OTHER: PITA LEWIS   DICTATING:  DENTON BARBOUR MD            REQ #:[id number]    EXAM:[facility] - CT CHEST without CONTRAST      REASON FOR EXAM:        REASON FOR VISIT:  PNEUMONIA            *******Signed******         PROCEDURE:   CT CHEST WITHOUT CONTRAST             COMPARISON:   [facility], CT, CHEST W/O CONTRAST,     6/16/2020, 12:45.             INDICATIONS:   FOLLOW UP PREVIOUS CT CHEST.             TECHNIQUE:   CT images were created without the administration of contrast     material.               PROTOCOL:     Standard imaging protocol performed                RADIATION:     DLP: 299.4mGy*cm          Automated exposure control was utilized to minimize radiation dose.              FINDINGS:         There are again multiple bilateral pulmonary nodules.  The only nodule of     concern is located in the       posterior segment of the right upper lobe on image 35 of series 204.  There is a     9 mm nodule which       was not definitely present on the previous study.  I would recommend further     evaluation with a       whole-body PET-CT exam.  The remaining nodules are felt to be secondary to a     chronic infectious or       inflammatory process.  There is bilateral apical calcified pleural/parenchymal     scarring.  Within       the both lower lobes, the right middle lobe, and the lingula, there is bronchial     wall thickening       with suggestion of bronchiectasis and intervening pulmonary scarring.  There     were small layering       pleural effusions on the previous study.  The patient still has a trace right     pleural effusion, but       the left pleural space is clear.  There are no enlarged mediastinal lymph nodes.      The hilar regions       are difficult to evaluate without contrast.  The heart is enlarged.  There are     coronary artery       atherosclerotic vascular calcifications.  The patient has a hiatal hernia.      There are partially       imaged round fluid density masses in both kidneys which likely represent cysts.      There are        postsurgical changes within the upper abdomen.  There are no suspicious     osteolytic or sclerotic       lesions in the bony structures.             CONTINUED ON NEXT PAGE...             CONCLUSION:         1. There is a new 9 mm nodule in the posterior segment of the right upper lobe.      I would recommend       a whole-body PET-CT exam for follow-up.      2. The remaining pulmonary nodules are felt to be unchanged and secondary to a     chronic infectious       or inflammatory process.      3. Bronchiectasis and bronchial wall thickening as described.      4. Near complete resolution of the pleural effusions.  The patient still has a     trace right pleural       effusion.      5. Additional findings as noted above.              DENTON BARBOUR MD             Electronically Signed and Approved By: DENTON BARBOUR MD on 11/02/2020 at 14:19                               Until signed, this is an unconfirmed preliminary report that may contain      errors and is subject to change.                                              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      * CBC With Auto Diff, 1 DAY         Dx: 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      * Comp Metabolic Panel, 1 DAY         Dx: 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      * Sputum Culture W/Gram Stain, Routine         Dx: 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  History of pneumonia secondary to MRSA pseudomonas klebsiella, completed     antibiotic therapy.      2.  AFB grew macrobacterium nonchromogenicum.      3.  PET scan showing focal radiopharmaceutical accumulation corresponding to the     cecum and ascending colon, ill defined abnormal possible soft tissue     attenuation involving the colon and the region suggesting a colonic mass.      4. Unintentional weight loss.      5. Intermittent nausea and vomiting after eating.      6.  Dyspnea.      7. Fatigue.      8.  Atrial fibrillation being followed by Dr. Otero and on Eliquis.      9. Congestive heart failure being followed by Dr. Otero.      10.  History of recurrent bronchitis.      11.  Tobacco abuse with cigarettes in remission.            PLAN:      1. The patient to continue Advair everyday as prescribed and rinse his mouth out     after each use.      2. The patient to continue albuterol inhaler and DuoNebs as needed.      3. I will refer patient to gastroenterologist due to having intermittent nausea     and vomiting after eating and also potential colonic mass seen on PET scan.      4. The patient's AFB grew mycobacterium nonchromogenicum.  I will start patient     on triple antibiotic therapy.  I will start patient on azithromycin 500 mg     Monday, Wednesday and Friday, rifampin 600 mg Monday, Wednesday and Friday and     ethambutol 800 mg Monday, Wednesday and Friday.        5. A call has also been placed to cardiologist to notify him of medications we     are starting patient on as patient is also on  Eliquis.        6. I spoke with Dr. Delgado and he advised to keep the Eliquis at the same dose of     5 mg twice a day due to patient's atrial fibrillation.      7.  Expectations and course of treatment were discussed with patient.  How to     take medications and possible side effects of medication discussed with patient.      The patient verbalized understanding.      8.  I will order a EKG. I will also order a CBC and CMP to be completed now and     monthly as patient is on triple antibiotic therapy for MAC infection. I will     also order sputum culture to be completed monthly.      9.  The patient is advised to follow up with Dr. Otero as scheduled and continue     Eliquis 5 mg twice a day.      10. Patient is advised to call the office, 911 or go to the ER with any new or w    orsening symptoms.      11. The patient reports he is up-to-date with all of his flu and pneumonia     vaccines.      12. Follow up with Dr. Delgado in 4-6 weeks, sooner if needed.            Patient Education      Patient Education Provided:  COPD      Time Spent:  > 50% /Coord [facility]            Electronically signed by [facility]IDA AKHTAR [facility]  11/10/2020 19:57       Disclaimer: Converted document may not contain table formatting or lab diagrams. Please see [employer] for the authenticated document.

## 2021-07-06 ENCOUNTER — OFFICE VISIT (OUTPATIENT)
Dept: PULMONOLOGY | Facility: CLINIC | Age: 86
End: 2021-07-06

## 2021-07-06 VITALS
HEART RATE: 77 BPM | DIASTOLIC BLOOD PRESSURE: 72 MMHG | SYSTOLIC BLOOD PRESSURE: 113 MMHG | HEIGHT: 69 IN | RESPIRATION RATE: 14 BRPM | TEMPERATURE: 96.4 F | OXYGEN SATURATION: 95 % | BODY MASS INDEX: 21.83 KG/M2 | WEIGHT: 147.4 LBS

## 2021-07-06 DIAGNOSIS — R53.83 FATIGUE, UNSPECIFIED TYPE: ICD-10-CM

## 2021-07-06 DIAGNOSIS — R06.09 DYSPNEA ON EXERTION: Primary | ICD-10-CM

## 2021-07-06 DIAGNOSIS — Z79.899 MEDICATION MANAGEMENT: ICD-10-CM

## 2021-07-06 DIAGNOSIS — I50.9 CONGESTIVE HEART FAILURE, UNSPECIFIED HF CHRONICITY, UNSPECIFIED HEART FAILURE TYPE (HCC): ICD-10-CM

## 2021-07-06 DIAGNOSIS — J44.9 CHRONIC OBSTRUCTIVE PULMONARY DISEASE, UNSPECIFIED COPD TYPE (HCC): ICD-10-CM

## 2021-07-06 DIAGNOSIS — R91.1 LUNG NODULE: ICD-10-CM

## 2021-07-06 DIAGNOSIS — A31.0 MYCOBACTERIUM AVIUM INFECTION (HCC): ICD-10-CM

## 2021-07-06 DIAGNOSIS — I48.91 ATRIAL FIBRILLATION, UNSPECIFIED TYPE (HCC): ICD-10-CM

## 2021-07-06 PROCEDURE — 99214 OFFICE O/P EST MOD 30 MIN: CPT | Performed by: NURSE PRACTITIONER

## 2021-07-06 RX ORDER — HYDROXYZINE PAMOATE 25 MG/1
CAPSULE ORAL
COMMUNITY
Start: 2021-03-31 | End: 2021-08-12

## 2021-07-06 RX ORDER — VENLAFAXINE HYDROCHLORIDE 150 MG/1
CAPSULE, EXTENDED RELEASE ORAL
COMMUNITY
End: 2022-08-25 | Stop reason: SDUPTHER

## 2021-07-06 RX ORDER — APIXABAN 5 MG/1
5 TABLET, FILM COATED ORAL 2 TIMES DAILY
COMMUNITY
Start: 2021-06-11

## 2021-07-06 RX ORDER — LISINOPRIL 5 MG/1
2.5 TABLET ORAL DAILY
COMMUNITY

## 2021-07-06 RX ORDER — ETHAMBUTOL HYDROCHLORIDE 400 MG/1
TABLET, FILM COATED ORAL
Qty: 24 TABLET | Refills: 4 | Status: SHIPPED | OUTPATIENT
Start: 2021-07-06 | End: 2022-08-02 | Stop reason: SDUPTHER

## 2021-07-06 RX ORDER — UBIDECARENONE 100 MG
CAPSULE ORAL DAILY
COMMUNITY

## 2021-07-06 RX ORDER — DIPHENOXYLATE HYDROCHLORIDE AND ATROPINE SULFATE 2.5; .025 MG/1; MG/1
TABLET ORAL
COMMUNITY
End: 2023-03-30 | Stop reason: SDUPTHER

## 2021-07-06 RX ORDER — ETHAMBUTOL HYDROCHLORIDE 400 MG/1
400 TABLET, FILM COATED ORAL DAILY
COMMUNITY
End: 2021-07-06 | Stop reason: SDUPTHER

## 2021-07-06 RX ORDER — AZITHROMYCIN 500 MG/1
TABLET, FILM COATED ORAL
COMMUNITY
End: 2021-07-06 | Stop reason: SDUPTHER

## 2021-07-06 RX ORDER — AZITHROMYCIN 500 MG/1
TABLET, FILM COATED ORAL
Qty: 12 TABLET | Refills: 4 | Status: SHIPPED | OUTPATIENT
Start: 2021-07-06 | End: 2021-07-07 | Stop reason: SDUPTHER

## 2021-07-06 RX ORDER — FLUTICASONE PROPIONATE 50 MCG
SPRAY, SUSPENSION (ML) NASAL
COMMUNITY
End: 2022-12-29 | Stop reason: SDUPTHER

## 2021-07-06 RX ORDER — TIOTROPIUM BROMIDE INHALATION SPRAY 1.56 UG/1
SPRAY, METERED RESPIRATORY (INHALATION)
COMMUNITY
End: 2022-01-10 | Stop reason: SDUPTHER

## 2021-07-06 RX ORDER — METOPROLOL SUCCINATE 25 MG/1
TABLET, EXTENDED RELEASE ORAL
COMMUNITY

## 2021-07-06 RX ORDER — OMEPRAZOLE 20 MG/1
CAPSULE, DELAYED RELEASE ORAL
COMMUNITY
End: 2022-08-25 | Stop reason: SDUPTHER

## 2021-07-06 RX ORDER — MELOXICAM 15 MG/1
TABLET ORAL
COMMUNITY

## 2021-07-06 RX ORDER — CHOLECALCIFEROL (VITAMIN D3) 1250 MCG
CAPSULE ORAL
COMMUNITY

## 2021-07-06 RX ORDER — TADALAFIL 20 MG/1
TABLET ORAL
COMMUNITY
End: 2021-07-26

## 2021-07-06 RX ORDER — RIFAMPIN 300 MG/1
CAPSULE ORAL
Qty: 24 CAPSULE | Refills: 4 | Status: SHIPPED | OUTPATIENT
Start: 2021-07-06 | End: 2022-06-30 | Stop reason: SDUPTHER

## 2021-07-06 RX ORDER — LANOLIN ALCOHOL/MO/W.PET/CERES
2500 CREAM (GRAM) TOPICAL DAILY
COMMUNITY

## 2021-07-06 RX ORDER — AMLODIPINE BESYLATE 5 MG/1
TABLET ORAL
COMMUNITY
End: 2021-07-26

## 2021-07-06 RX ORDER — FUROSEMIDE 20 MG/1
TABLET ORAL
COMMUNITY

## 2021-07-06 RX ORDER — RIFAMPIN 300 MG/1
300 CAPSULE ORAL
COMMUNITY
End: 2021-07-06 | Stop reason: SDUPTHER

## 2021-07-06 RX ORDER — TAMSULOSIN HYDROCHLORIDE 0.4 MG/1
0.4 CAPSULE ORAL 2 TIMES DAILY
COMMUNITY

## 2021-07-06 RX ORDER — AMOXICILLIN AND CLAVULANATE POTASSIUM 500; 125 MG/1; MG/1
TABLET, FILM COATED ORAL
COMMUNITY
Start: 2021-06-21 | End: 2021-07-26 | Stop reason: SDUPTHER

## 2021-07-06 RX ORDER — MULTIPLE VITAMINS W/ MINERALS TAB 9MG-400MCG
TAB ORAL
COMMUNITY
End: 2022-08-25 | Stop reason: SDUPTHER

## 2021-07-06 RX ORDER — TRAMADOL HYDROCHLORIDE 50 MG/1
TABLET ORAL
COMMUNITY
Start: 2021-06-16

## 2021-07-06 RX ORDER — MECLIZINE HYDROCHLORIDE 25 MG/1
TABLET ORAL
COMMUNITY
End: 2022-08-25 | Stop reason: SDUPTHER

## 2021-07-06 RX ORDER — AZITHROMYCIN 500 MG/1
TABLET, FILM COATED ORAL
COMMUNITY
Start: 2021-06-04 | End: 2022-06-30 | Stop reason: SDUPTHER

## 2021-07-06 RX ORDER — CLONAZEPAM 0.5 MG/1
TABLET ORAL
COMMUNITY

## 2021-07-06 RX ORDER — FINASTERIDE 5 MG/1
TABLET, FILM COATED ORAL
COMMUNITY

## 2021-07-06 RX ORDER — DULOXETIN HYDROCHLORIDE 60 MG/1
CAPSULE, DELAYED RELEASE ORAL
COMMUNITY

## 2021-07-06 NOTE — PROGRESS NOTES
Primary Care Provider  Ananth Francisco DO     Referring Provider  No ref. provider found     Chief Complaint  COPD (3 month F/U), Shortness of Breath, and Fatigue    Subjective          Wesley Cunningham presents to [facility] PULMONARY & CRITICAL CARE MEDICINE  History of Present Illness  Wesley Cunningham is a 89 y.o. male patient of Dr. Delgado with a history of mycobacterial lung infection, COPD, history of MRSA, and lung nodules. He is here for routine follow-up today.    Patient states that since his last visit he is doing well. He continues to take ethambutol, rifampin, and azithromycin on Mondays, Wednesdays, and Fridays. He is requesting refill of these today. He has not had blood work in the past few months and is asking if he needs to do so. He continues to use his Advair and Tudorza as prescribed. He describes the shortness of breath as mild in severity, worse with exertion and improved with rest. Overall, patient states that he is doing well. He is able to perform his ADLs without difficulty. He is up-to-date with his flu, pneumonia, and Covid vaccines.      His history of smoking is      Tobacco Use: Low Risk    • Smoking Tobacco Use: Never Smoker   • Smokeless Tobacco Use: Never Used   .    Review of Systems   Constitutional: Negative for chills, fatigue, fever, unexpected weight gain and unexpected weight loss.   HENT: Negative for congestion (Nasal), hearing loss, mouth sores, nosebleeds, postnasal drip, sore throat and trouble swallowing.    Eyes: Negative for blurred vision and visual disturbance.   Respiratory: Positive for shortness of breath. Negative for apnea, cough and wheezing.         Negative for Hemoptysis     Cardiovascular: Negative for chest pain, palpitations and leg swelling.   Gastrointestinal: Negative for abdominal pain, constipation, diarrhea, nausea, vomiting and GERD.        Negative for Jaundice  Negative for Bloating  Negative for Melena   Musculoskeletal:  Negative for joint swelling and myalgias.        Negative for Joint pain  Negative for Joint stiffness   Skin: Negative for color change.        Negative for cyanosis   Neurological: Negative for syncope, weakness, numbness and headache.      Sleep: Negative for Excessive daytime sleep  Negative for for morning headaches  Negative for Snoring    History reviewed. No pertinent family history.     Social History     Socioeconomic History   • Marital status:      Spouse name: Not on file   • Number of children: Not on file   • Years of education: Not on file   • Highest education level: Not on file   Tobacco Use   • Smoking status: Never Smoker   • Smokeless tobacco: Never Used   Vaping Use   • Vaping Use: Never used   Substance and Sexual Activity   • Alcohol use: Never   • Drug use: Defer   • Sexual activity: Defer        Past Medical History:   Diagnosis Date   • Asthma, extrinsic    • Chronic bronchitis (CMS/HCC)    • COPD (chronic obstructive pulmonary disease) (CMS/[facility])    • Emphysema of lung (CMS/HCC)    • Hypertension    • Pneumonia         Immunization History   Administered Date(s) Administered   • COVID-19 (MODERNA) 02/09/2021, 03/11/2021   • Fluzone High Dose =>65 Years ([employer] ONLY) 10/06/2020         Allergies   Allergen Reactions   • Amoxicillin Anaphylaxis   • Iodinated Diagnostic Agents Anaphylaxis   • Levofloxacin Anaphylaxis   • Contrast Dye Rash          Current Outpatient Medications:   •  aclidinium bromide (TUDORZA PRESSAIR) 400 MCG/ACT aerosol powder  powder for inhalation, Inhale 1 puff 2 (Two) Times a Day., Disp: 2 each, Rfl: 0  •  azithromycin (ZITHROMAX) 500 MG tablet, Take one tablet on Monday, Wednesday, and Friday, Disp: 12 tablet, Rfl: 4  •  Calcium Carbonate 1500 (600 Ca) MG tablet, Take  by mouth., Disp: , Rfl:   •  Cholecalciferol (Vitamin D3) 1.25 MG (00260 UT) capsule, , Disp: , Rfl:   •  Coenzyme Q10 60 MG capsule, Take  by mouth., Disp: , Rfl:   •  DULoxetine (Cymbalta) 60  MG capsule, , Disp: , Rfl:   •  Eliquis 5 MG tablet tablet, , Disp: , Rfl:   •  ethambutol (MYAMBUTOL) 400 MG tablet, Take 2 tablets on Monday, Wednesday, and Friday, Disp: 24 tablet, Rfl: 4  •  fluticasone (FLONASE) 50 MCG/ACT nasal spray, , Disp: , Rfl:   •  fluticasone-salmeterol (ADVAIR) 250-50 MCG/DOSE DISKUS, Inhale 1 puff 2 (Two) Times a Day., Disp: 180 each, Rfl: 3  •  furosemide (LASIX) 20 MG tablet, furosemide 20 mg oral tablet take 1 tablet (20 mg) by oral route once daily   Active, Disp: , Rfl:   •  lisinopril (PRINIVIL,ZESTRIL) 2.5 MG tablet, lisinopril 2.5 mg oral tablet take 1 tablet (2.5 mg) by oral route once daily   Active, Disp: , Rfl:   •  meclizine (ANTIVERT) 25 MG tablet, , Disp: , Rfl:   •  meloxicam (MOBIC) 15 MG tablet, meloxicam 15 mg oral tablet take 1 tablet (15 mg) by oral route once daily   Active, Disp: , Rfl:   •  metoprolol succinate XL (TOPROL-XL) 25 MG 24 hr tablet, metoprolol succinate 25 mg oral tablet extended release 24 hr take 1/2 tablet by oral route twice daily.   Active, Disp: , Rfl:   •  MULTIPLE VITAMINS-CALCIUM PO, , Disp: , Rfl:   •  multivitamin with minerals (EYE VITAMINS & MINERALS PO), , Disp: , Rfl:   •  omeprazole (priLOSEC) 20 MG capsule, , Disp: , Rfl:   •  rifAMPin (RIFADIN) 300 MG capsule, Take 2 tablets on Monday, Wednesday, and Friday., Disp: 24 capsule, Rfl: 4  •  tamsulosin (FLOMAX) 0.4 MG capsule 24 hr capsule, , Disp: , Rfl:   •  traMADol (ULTRAM) 50 MG tablet, , Disp: , Rfl:   •  venlafaxine XR (EFFEXOR-XR) 150 MG 24 hr capsule, venlafaxine 150 mg oral capsule,extended release 24hr take 1 capsule (150 mg) by oral route once daily   Active, Disp: , Rfl:   •  vitamin B-12 (CYANOCOBALAMIN) 1000 MCG tablet, Take 2,500 mcg by mouth Daily., Disp: , Rfl:   •  amLODIPine (NORVASC) 5 MG tablet, amlodipine 5 mg oral tablet take 1 tablet (5 mg) by oral route once daily   Active, Disp: , Rfl:   •  amoxicillin-clavulanate (AUGMENTIN) 500-125 MG per tablet, , Disp: ,  Rfl:   •  apixaban (Eliquis) 5 MG tablet tablet, Eliquis 5 mg oral tablet take 1 tablet (5 mg) by oral route 2 times per day   Active, Disp: , Rfl:   •  azithromycin (ZITHROMAX) 500 MG tablet, , Disp: , Rfl:   •  Cholecalciferol 50 MCG (2000 UT) tablet, Take  by mouth., Disp: , Rfl:   •  clonazePAM (KlonoPIN) 0.5 MG tablet, , Disp: , Rfl:   •  finasteride (PROSCAR) 5 MG tablet, , Disp: , Rfl:   •  hydrOXYzine pamoate (VISTARIL) 25 MG capsule, , Disp: , Rfl:   •  multivitamin (THERAGRAN) tablet tablet, Take  by mouth., Disp: , Rfl:   •  tadalafil (Cialis) 20 MG tablet, Cialis 20 mg oral tablet take 1 tablet (20 mg) by oral route once daily   Suspended, Disp: , Rfl:   •  Tiotropium Bromide Monohydrate (Spiriva Respimat) 1.25 MCG/ACT aerosol solution inhaler, , Disp: , Rfl:   •  Wixela Inhub 250-50 MCG/DOSE DISKUS, Inhale 1 puff 2 (two) times a day., Disp: , Rfl:      Objective   Physical Exam  Constitutional:       General: He is not in acute distress.     Appearance: Normal appearance. He is normal weight.   HENT:      Right Ear: Hearing normal.      Left Ear: Hearing normal.      Nose: No nasal tenderness or congestion.      Mouth/Throat:      Mouth: Mucous membranes are moist. No oral lesions.   Eyes:      Extraocular Movements: Extraocular movements intact.      Pupils: Pupils are equal, round, and reactive to light.   Neck:      Thyroid: No thyroid mass or thyromegaly.   Cardiovascular:      Rate and Rhythm: Normal rate and regular rhythm.      Pulses: Normal pulses.      Heart sounds: Normal heart sounds. No murmur heard.     Pulmonary:      Effort: Pulmonary effort is normal.      Breath sounds: Normal breath sounds. No wheezing, rhonchi or rales.   Abdominal:      General: Bowel sounds are normal. There is no distension.      Palpations: Abdomen is soft.      Tenderness: There is no abdominal tenderness.   Musculoskeletal:      Cervical back: Neck supple.      Right lower leg: No edema.      Left lower leg: No  "edema.   Lymphadenopathy:      Cervical: No cervical adenopathy.      Upper Body:      Right upper body: No axillary adenopathy.   Skin:     General: Skin is warm and dry.      Findings: No lesion or rash.   Neurological:      General: No focal deficit present.      Mental Status: He is alert and oriented to person, place, and time.      Cranial Nerves: Cranial nerves are intact.   Psychiatric:         Mood and Affect: Affect normal. Mood is not anxious or depressed.         Vital Signs:   /72 (BP Location: Left arm, Patient Position: Sitting, Cuff Size: Adult)   Pulse 77   Temp 96.4 °F (35.8 °C) (Temporal)   Resp 14   Ht 175.3 cm (69\")   Wt 66.9 kg (147 lb 6.4 oz)   SpO2 95%   BMI 21.77 kg/m²        Result Review :     CMP    CMP 11/9/20   Glucose 82   BUN 24   Creatinine 1.21 (A)   Sodium 138   Potassium 4.3   Chloride 101   Calcium 9.3   Albumin 3.8   Total Bilirubin 0.46   Alkaline Phosphatase 71   AST (SGOT) 30   ALT (SGPT) 17   (A) Abnormal value       Comments are available for some flowsheets but are not being displayed.           CBC w/diff    CBC w/Diff 11/9/20   WBC 9.40   RBC 5.00   Hemoglobin 14.2   Hematocrit 44.0   MCV 88.0   MCH 28.4   MCHC 32.3 (A)   RDW 15.5 (A)   Platelets 182   Neutrophil Rel % 61.6   Lymphocyte Rel % 26.4   Monocyte Rel % 8.9   Eosinophil Rel % 2.2   Basophil Rel % 0.6   (A) Abnormal value            Data reviewed: Radiologic studies CT scan chest 11/2/2020 and Dr. Delgado's last office note   Procedures        Assessment and Plan    Diagnoses and all orders for this visit:    1. Dyspnea on exertion (Primary)    2. Fatigue, unspecified type    3. Chronic obstructive pulmonary disease, unspecified COPD type (CMS/HCC)  -     aclidinium bromide (TUDORZA PRESSAIR) 400 MCG/ACT aerosol powder  powder for inhalation; Inhale 1 puff 2 (Two) Times a Day.  Dispense: 2 each; Refill: 0    4. Mycobacterium avium infection (CMS/HCC)  -     azithromycin (ZITHROMAX) 500 MG tablet; " Take one tablet on Monday, Wednesday, and Friday  Dispense: 12 tablet; Refill: 4  -     ethambutol (MYAMBUTOL) 400 MG tablet; Take 2 tablets on Monday, Wednesday, and Friday  Dispense: 24 tablet; Refill: 4  -     rifAMPin (RIFADIN) 300 MG capsule; Take 2 tablets on Monday, Wednesday, and Friday.  Dispense: 24 capsule; Refill: 4    5. Medication management  -     CBC & Differential; Future  -     Comprehensive Metabolic Panel; Future    6. Lung nodule    7. Congestive heart failure, unspecified HF chronicity, unspecified heart failure type (CMS/HCC)    8. Atrial fibrillation, unspecified type (CMS/HCC)    9. Continue Tudorza and Advair as prescribed. Rinse mouth out after each use.  10. Continue Monday, Wednesday, and Friday azithromycin, ethambutol, and rifampin.  11. Continue follow-up with cardiology for congestive heart failure and atrial fibrillation  12. Instructed patient to call the office, 911, or go to the emergency room with any new or worsening symptoms      Follow Up   Return in about 2 months (around 9/6/2021) for Recheck with Dr. Delgado.  Patient was given instructions and counseling regarding his condition or for health maintenance advice. Please see specific information pulled into the AVS if appropriate.

## 2021-07-07 DIAGNOSIS — A31.0 MYCOBACTERIUM AVIUM INFECTION (HCC): ICD-10-CM

## 2021-07-07 RX ORDER — AZITHROMYCIN 500 MG/1
TABLET, FILM COATED ORAL
Qty: 12 TABLET | Refills: 4 | Status: SHIPPED | OUTPATIENT
Start: 2021-07-07 | End: 2021-07-26 | Stop reason: SDUPTHER

## 2021-07-26 ENCOUNTER — OFFICE VISIT (OUTPATIENT)
Dept: CARDIOLOGY | Facility: CLINIC | Age: 86
End: 2021-07-26

## 2021-07-26 VITALS
DIASTOLIC BLOOD PRESSURE: 66 MMHG | HEART RATE: 92 BPM | SYSTOLIC BLOOD PRESSURE: 108 MMHG | WEIGHT: 141 LBS | BODY MASS INDEX: 20.88 KG/M2 | HEIGHT: 69 IN

## 2021-07-26 DIAGNOSIS — I48.20 ATRIAL FIBRILLATION, CHRONIC (HCC): Primary | ICD-10-CM

## 2021-07-26 PROCEDURE — 99203 OFFICE O/P NEW LOW 30 MIN: CPT | Performed by: INTERNAL MEDICINE

## 2021-07-26 PROCEDURE — 93000 ELECTROCARDIOGRAM COMPLETE: CPT | Performed by: INTERNAL MEDICINE

## 2021-08-01 NOTE — PROGRESS NOTES
CARDIOLOGY INITIAL CONSULT       Chief Complaint  Establish Care and Congestive Heart Failure, atrial fibrillation    Subjective            Wesley Cunningham presents to [facility] CARDIOLOGY  History of Present Illness    This is an 89-year-old male with a chronic of subdural disease, history of mycobacterial lung infection, congestive heart failure and atrial fibrillation.  Patient is here to establish cardiac care.  He was previously seen and evaluated by Dr. Earl Espinosa in [facility].  Per patient, he was diagnosed atrial fibrillation approximately 6 months back.  Dr. Espinosa did an echocardiogram and SPECT stress test and advised 1 year follow-up.  He is already on anticoagulation along with metoprolol.  Recently he is noticing increased shortness of breath both at rest and activities, which he thinks partially contributed by atrial fibrillation.  He does not feel any palpitations.  He denies any chest pain tightness or pressure.  He denies symptoms history of orthopnea.  He has mild pedal edema and he is on Lasix for an unknown duration of time.  He has no history of coronary artery disease.      Past History:    Persistent atrial fibrillation  Congestive heart failure, unknown type    Medical History: has a past medical history of Anxiety, Asthma, extrinsic, Chronic atrial fibrillation (CMS/HCC), Chronic bronchitis (CMS/HCC), COPD (chronic obstructive pulmonary disease) (CMS/HCC), Depression, Dizzy, Emphysema of lung (CMS/HCC), Erectile dysfunction, Hypertension, Pneumonia, and Prostatitis.     Surgical History: has a past surgical history that includes Bronchoscopy; Cholecystectomy; Hiatal hernia repair; Elbow Arthroplasty; Other surgical history; Colonoscopy (2020); External ear surgery; Esophagogastroduodenoscopy (2018); and Cataract extraction, bilateral.     Family History: Reviewed, no family history of premature coronary artery disease.    Social History: reports that he  has never smoked. He has never used smokeless tobacco. He reports that he does not drink alcohol and does not use drugs.    Allergies: Amoxicillin, Iodinated diagnostic agents, Levofloxacin, and Contrast dye    Current Outpatient Medications on File Prior to Visit   Medication Sig   • aclidinium bromide (TUDORZA PRESSAIR) 400 MCG/ACT aerosol powder  powder for inhalation Inhale 1 puff 2 (Two) Times a Day.   • apixaban (Eliquis) 5 MG tablet tablet Eliquis 5 mg oral tablet take 1 tablet (5 mg) by oral route 2 times per day   Active   • azithromycin (ZITHROMAX) 500 MG tablet    • Calcium Carbonate 1500 (600 Ca) MG tablet Take  by mouth.   • Cholecalciferol 50 MCG (2000 UT) tablet Take  by mouth.   • coenzyme Q10 100 MG capsule Take  by mouth Daily.   • Eliquis 5 MG tablet tablet Take 5 mg by mouth 2 (two) times a day.   • ethambutol (MYAMBUTOL) 400 MG tablet Take 2 tablets on Monday, Wednesday, and Friday   • finasteride (PROSCAR) 5 MG tablet    • fluticasone (FLONASE) 50 MCG/ACT nasal spray    • furosemide (LASIX) 20 MG tablet furosemide 20 mg oral tablet take 1 tablet (20 mg) by oral route once daily   Active   • lisinopril (PRINIVIL,ZESTRIL) 5 MG tablet Take 2.5 mg by mouth Daily.   • meclizine (ANTIVERT) 25 MG tablet    • meloxicam (MOBIC) 15 MG tablet meloxicam 15 mg oral tablet take 1 tablet (15 mg) by oral route once daily   Active   • metoprolol succinate XL (TOPROL-XL) 25 MG 24 hr tablet metoprolol succinate 25 mg oral tablet extended release 24 hr take 1/2 tablet by oral route twice daily.   Active   • multivitamin (THERAGRAN) tablet tablet Take  by mouth.   • multivitamin with minerals (EYE VITAMINS & MINERALS PO)    • omeprazole (priLOSEC) 20 MG capsule    • rifAMPin (RIFADIN) 300 MG capsule Take 2 tablets on Monday, Wednesday, and Friday.   • tamsulosin (FLOMAX) 0.4 MG capsule 24 hr capsule Take 0.4 mg by mouth 2 (two) times a day.   • venlafaxine XR (EFFEXOR-XR) 150 MG 24 hr capsule venlafaxine 150 mg  "oral capsule,extended release 24hr take 1 capsule (150 mg) by oral route once daily   Active   • vitamin B-12 (CYANOCOBALAMIN) 1000 MCG tablet Take 2,500 mcg by mouth Daily.   • Cholecalciferol (Vitamin D3) 1.25 MG (24885 UT) capsule    • clonazePAM (KlonoPIN) 0.5 MG tablet    • DULoxetine (Cymbalta) 60 MG capsule    • fluticasone-salmeterol (ADVAIR) 250-50 MCG/DOSE DISKUS Inhale 1 puff 2 (Two) Times a Day.   • hydrOXYzine pamoate (VISTARIL) 25 MG capsule    • Tiotropium Bromide Monohydrate (Spiriva Respimat) 1.25 MCG/ACT aerosol solution inhaler    • traMADol (ULTRAM) 50 MG tablet    • Wixela Inhub 250-50 MCG/DOSE DISKUS Inhale 1 puff 2 (two) times a day.     No current facility-administered medications on file prior to visit.          Review of Systems   Constitutional: Negative for fatigue, unexpected weight gain and unexpected weight loss.   Eyes: Negative for double vision.   Respiratory: Positive for shortness of breath and wheezing. Negative for cough.    Cardiovascular: Positive for leg swelling. Negative for chest pain and palpitations.   Gastrointestinal: Negative for abdominal pain, nausea and vomiting.   Endocrine: Negative for cold intolerance, heat intolerance, polydipsia and polyuria.   Musculoskeletal: Negative for arthralgias and back pain.   Skin: Negative for color change.   Neurological: Negative for dizziness, syncope, weakness and headache.   Hematological: Does not bruise/bleed easily.        Objective     /66   Pulse 92   Ht 175.3 cm (69\")   Wt 64 kg (141 lb)   BMI 20.82 kg/m²       Physical Exam  Constitutional:       General: He is awake. He is not in acute distress.     Appearance: Normal appearance.   Eyes:      Extraocular Movements: Extraocular movements intact.      Pupils: Pupils are equal, round, and reactive to light.   Neck:      Thyroid: No thyromegaly.      Vascular: No carotid bruit or JVD.   Cardiovascular:      Rate and Rhythm: Normal rate. Rhythm irregular.      " Chest Wall: PMI is not displaced.      Heart sounds: Normal heart sounds, S1 normal and S2 normal. No murmur heard.   No friction rub. No gallop. No S3 or S4 sounds.    Pulmonary:      Effort: Pulmonary effort is normal. No respiratory distress.      Breath sounds: Normal air entry. Wheezing present. No rhonchi or rales.   Abdominal:      General: Bowel sounds are normal.      Palpations: Abdomen is soft.      Tenderness: There is no abdominal tenderness.   Musculoskeletal:      Cervical back: Neck supple.      Right lower leg: Edema (Mild edema bilaterally) present.      Left lower leg: Edema present.   Skin:     Coloration: Skin is not cyanotic.      Findings: No petechiae or rash.      Nails: There is no clubbing.   Neurological:      General: No focal deficit present.      Mental Status: He is alert and oriented to person, place, and time.           Result Review :     The following data was reviewed by: Pilo Brown MD on 07/26/2021:    CMP    CMP 11/9/20   Glucose 82   BUN 24   Creatinine 1.21 (A)   Sodium 138   Potassium 4.3   Chloride 101   Calcium 9.3   Albumin 3.8   Total Bilirubin 0.46   Alkaline Phosphatase 71   AST (SGOT) 30   ALT (SGPT) 17   (A) Abnormal value       Comments are available for some flowsheets but are not being displayed.           CBC    CBC 11/9/20   WBC 9.40   RBC 5.00   Hemoglobin 14.2   Hematocrit 44.0   MCV 88.0   MCH 28.4   MCHC 32.3 (A)   RDW 15.5 (A)   Platelets 182   (A) Abnormal value                   Data reviewed: Cardiology studies     EKG done on 8/24/2020 showed atrial fibrillation with controlled ventricular rate, LVH with repolarization normalities      ECG 12 Lead    Date/Time: 8/1/2021 4:35 PM  Performed by: Pilo Bronw MD  Authorized by: Pilo Brown MD   Comparison: compared with previous ECG from 8/24/2020  Similar to previous ECG  Rhythm: atrial fibrillation  Rate: normal  ST Segments: ST segments normal  T Waves: T waves normal  QRS axis:  normal  Other: no other findings    Clinical impression: abnormal EKG                Assessment and Plan        Diagnoses and all orders for this visit:    1. Atrial fibrillation, chronic (CMS/HCC) (Primary)  -     Adult Transthoracic Echo Complete w/ Color, Spectral and Contrast if necessary per protocol; Future    Currently in A. fib with controlled ventricular rates.  He was previously seen by Dr. Espinosa, cardiologist and on Eliquis.  Will get the previous SPECT stress test report [facility].  We will proceed with an echocardiogram to rule out significant valvular abnormalities and reassess LV function.  The nature of the condition and potential complication including the risk of stroke were explained detail to the patient.  Continue Eliquis and metoprolol the current dose.    2.  Reported congestive heart failure    Baseline ejection fraction unknown, will proceed with echo as mentioned above mild swelling on physical examination but no other evidence of volume overload.  Continue Lasix at the current dose.  Recent labs showed normal electrolytes and renal functions.     I spent 28 minutes caring for Wesley on this date of service. This time includes time spent by me in the following activities:reviewing tests, obtaining and/or reviewing a separately obtained history, performing a medically appropriate examination and/or evaluation , ordering medications, tests, or procedures, and documenting information in the medical record    Follow Up     Return in about 6 months (around 1/26/2022) for Next scheduled follow up.    Patient was given instructions and counseling regarding his condition or for health maintenance advice. Please see specific information pulled into the AVS if appropriate.

## 2021-08-06 ENCOUNTER — TELEPHONE (OUTPATIENT)
Dept: PULMONOLOGY | Facility: CLINIC | Age: 86
End: 2021-08-06

## 2021-08-06 NOTE — TELEPHONE ENCOUNTER
Patient called and left a voicemail requesting that we call him back to give him the information about his next appointment (date and time). Please call patient, thank you!

## 2021-08-09 ENCOUNTER — TELEPHONE (OUTPATIENT)
Dept: CARDIOLOGY | Facility: CLINIC | Age: 86
End: 2021-08-09

## 2021-08-09 NOTE — TELEPHONE ENCOUNTER
----- Message from Pilo Brown MD sent at 8/8/2021  5:17 PM EDT -----  Heart function is normal.  There are no major valve problems.    Continue all the current medications.    Follow-up as scheduled in 6 months.    Please call with any problems including chest pain or palpitations in the meantime.

## 2021-08-09 NOTE — TELEPHONE ENCOUNTER
S/W patient regarding results of echo. Advised to keep f/u as scheduled and call with any new or worsening symptoms.

## 2021-08-10 ENCOUNTER — TELEPHONE (OUTPATIENT)
Dept: PULMONOLOGY | Facility: CLINIC | Age: 86
End: 2021-08-10

## 2021-08-10 NOTE — TELEPHONE ENCOUNTER
Patient's daughter left voicemail stating that patient needs an appointment ASAP because his O2 is very low even with being on oxygen. Please call patient's daughter, Sasha, at [phone]. Thank you.

## 2021-08-12 ENCOUNTER — OFFICE VISIT (OUTPATIENT)
Dept: PULMONOLOGY | Facility: CLINIC | Age: 86
End: 2021-08-12

## 2021-08-12 VITALS
DIASTOLIC BLOOD PRESSURE: 68 MMHG | WEIGHT: 135 LBS | RESPIRATION RATE: 17 BRPM | SYSTOLIC BLOOD PRESSURE: 105 MMHG | HEIGHT: 69 IN | BODY MASS INDEX: 19.99 KG/M2 | TEMPERATURE: 97.5 F | OXYGEN SATURATION: 86 %

## 2021-08-12 DIAGNOSIS — I50.9 CONGESTIVE HEART FAILURE, UNSPECIFIED HF CHRONICITY, UNSPECIFIED HEART FAILURE TYPE (HCC): ICD-10-CM

## 2021-08-12 DIAGNOSIS — J44.1 CHRONIC OBSTRUCTIVE PULMONARY DISEASE WITH ACUTE EXACERBATION (HCC): Primary | ICD-10-CM

## 2021-08-12 DIAGNOSIS — I48.91 ATRIAL FIBRILLATION, UNSPECIFIED TYPE (HCC): ICD-10-CM

## 2021-08-12 DIAGNOSIS — R06.09 DYSPNEA ON EXERTION: ICD-10-CM

## 2021-08-12 DIAGNOSIS — A31.0 MYCOBACTERIUM AVIUM COMPLEX (HCC): ICD-10-CM

## 2021-08-12 DIAGNOSIS — R91.1 LUNG NODULE: ICD-10-CM

## 2021-08-12 DIAGNOSIS — J96.01 ACUTE RESPIRATORY FAILURE WITH HYPOXIA (HCC): ICD-10-CM

## 2021-08-12 PROCEDURE — 99215 OFFICE O/P EST HI 40 MIN: CPT | Performed by: NURSE PRACTITIONER

## 2021-08-12 RX ORDER — PREDNISONE 10 MG/1
TABLET ORAL
COMMUNITY
Start: 2021-08-03 | End: 2022-01-10

## 2021-08-12 RX ORDER — IPRATROPIUM BROMIDE AND ALBUTEROL SULFATE 2.5; .5 MG/3ML; MG/3ML
3 SOLUTION RESPIRATORY (INHALATION) 4 TIMES DAILY PRN
Qty: 360 ML | Refills: 3 | Status: SHIPPED | OUTPATIENT
Start: 2021-08-12 | End: 2022-12-29

## 2021-08-12 RX ORDER — HYDROXYZINE HYDROCHLORIDE 25 MG/1
25 TABLET, FILM COATED ORAL
COMMUNITY
Start: 2021-08-10 | End: 2021-08-21

## 2021-08-12 RX ORDER — PREDNISONE 20 MG/1
40 TABLET ORAL DAILY
Qty: 14 TABLET | Refills: 0 | Status: SHIPPED | OUTPATIENT
Start: 2021-08-12 | End: 2021-08-19

## 2021-08-12 RX ORDER — LEVOFLOXACIN 750 MG/1
TABLET ORAL
COMMUNITY
Start: 2021-07-30 | End: 2022-01-10

## 2021-08-12 RX ORDER — CEFDINIR 300 MG/1
300 CAPSULE ORAL
COMMUNITY
Start: 2021-08-03 | End: 2021-08-14

## 2021-08-12 RX ORDER — MULTIPLE VITAMINS W/ MINERALS TAB 9MG-400MCG
1 TAB ORAL DAILY
COMMUNITY

## 2021-08-12 RX ORDER — IPRATROPIUM BROMIDE 42 UG/1
2 SPRAY, METERED NASAL
COMMUNITY
Start: 2021-07-27

## 2021-08-12 RX ORDER — TIOTROPIUM BROMIDE INHALATION SPRAY 3.12 UG/1
2 SPRAY, METERED RESPIRATORY (INHALATION)
Qty: 1 EACH | Refills: 11 | Status: SHIPPED | OUTPATIENT
Start: 2021-08-12 | End: 2022-08-25

## 2021-08-12 NOTE — PROGRESS NOTES
Primary Care Provider  Ananth Francisco DO     Referring Provider  No ref. provider found     Chief Complaint  COPD (Acute visit, O2 dropping), Shortness of Breath, Wheezing, and Cough    Subjective          Wesley Cunningham presents to [facility]  History of Present Illness  Wesley Cunningham is a 89 y.o. male patient of Dr. Delgado with a history of mycobacterial lung infection, COPD, history of MRSA, and lung nodules.  He is here for an acute visit today.    Patient states that he recently went to the hospital at [place] and was admitted for 3 days for COPD exacerbation.  He was sent home with antibiotics and steroids at that time, but states that he does not feel that he was ready to be discharged.  His shortness of breath is moderate in severity, worse with exertion and improved with rest.  He states that he has been keeping an eye on his oxygen at home and it has been in the 70s at times.  He has not been wearing his oxygen like he should be.  He states that he does not like the nasal cannula and it is hard to keep in his nose.  He continues to use Wixela and Tudorza as prescribed.  He would like to switch back to Advair and Spiriva if possible.  He also states that in the hospital he was given breathing treatments and these helped him greatly.  He states that he rarely uses his albuterol inhaler.  He continues to use ethambutol, rifampin, and azithromycin on Mondays, Wednesdays, and Fridays.  He is also complaining of a lack of appetite and would like something to help him eat more at this time.  He is able to form his ADLs as long as he paces himself.  He is up-to-date with his pneumonia and Covid vaccines.  He will be due for a flu vaccine this fall.      His history of smoking is      Tobacco Use: Low Risk    • Smoking Tobacco Use: Never Smoker   • Smokeless Tobacco Use: Never Used   .    Review of Systems   Constitutional: Positive for fatigue.  Negative for chills, fever, unexpected weight gain and unexpected weight loss.   HENT: Negative for congestion (Nasal), hearing loss, mouth sores, nosebleeds, postnasal drip, sore throat and trouble swallowing.    Eyes: Negative for blurred vision and visual disturbance.   Respiratory: Positive for cough, shortness of breath and wheezing. Negative for apnea.         Negative for Hemoptysis     Cardiovascular: Negative for chest pain, palpitations and leg swelling.   Gastrointestinal: Negative for abdominal pain, constipation, diarrhea, nausea, vomiting and GERD.        Negative for Jaundice  Negative for Bloating  Negative for Melena   Musculoskeletal: Negative for joint swelling and myalgias.        Negative for Joint pain  Negative for Joint stiffness   Skin: Negative for color change.        Negative for cyanosis   Neurological: Negative for syncope, weakness, numbness and headache.      Sleep: Negative for Excessive daytime sleep  Negative for for morning headaches  Negative for Snoring    History reviewed. No pertinent family history.     Social History     Socioeconomic History   • Marital status:      Spouse name: Not on file   • Number of children: Not on file   • Years of education: Not on file   • Highest education level: Not on file   Tobacco Use   • Smoking status: Never Smoker   • Smokeless tobacco: Never Used   Vaping Use   • Vaping Use: Never used   Substance and Sexual Activity   • Alcohol use: Never   • Drug use: Never   • Sexual activity: Defer        Past Medical History:   Diagnosis Date   • Anxiety    • Asthma, extrinsic    • Chronic atrial fibrillation (CMS/HCC)    • Chronic bronchitis (CMS/HCC)    • COPD (chronic obstructive pulmonary disease) (CMS/HCC)    • Depression    • Dizzy    • Emphysema of lung (CMS/HCC)    • Erectile dysfunction    • Hypertension    • Pneumonia    • Prostatitis         Immunization History   Administered Date(s) Administered   • COVID-19 (MODERNA) 02/09/2021,  03/11/2021   • Fluzone High Dose =>65 Years ([employer] ONLY) 10/06/2020         Allergies   Allergen Reactions   • Amoxicillin Anaphylaxis   • Iodinated Diagnostic Agents Anaphylaxis   • Levofloxacin Anaphylaxis   • Contrast Dye Rash          Current Outpatient Medications:   •  ACIDOPHILUS LACTOBACILLUS PO, Take 1 capsule by mouth., Disp: , Rfl:   •  aclidinium bromide (TUDORZA PRESSAIR) 400 MCG/ACT aerosol powder  powder for inhalation, Inhale 1 puff 2 (Two) Times a Day., Disp: 2 each, Rfl: 0  •  azithromycin (ZITHROMAX) 500 MG tablet, , Disp: , Rfl:   •  Calcium Carbonate 1500 (600 Ca) MG tablet, Take  by mouth., Disp: , Rfl:   •  cefdinir (OMNICEF) 300 MG capsule, Take 300 mg by mouth., Disp: , Rfl:   •  Cholecalciferol (Vitamin D3) 1.25 MG (58204 UT) capsule, , Disp: , Rfl:   •  Cholecalciferol 50 MCG (2000 UT) tablet, Take  by mouth., Disp: , Rfl:   •  coenzyme Q10 100 MG capsule, Take  by mouth Daily., Disp: , Rfl:   •  DULoxetine (Cymbalta) 60 MG capsule, , Disp: , Rfl:   •  Eliquis 5 MG tablet tablet, Take 5 mg by mouth 2 (two) times a day., Disp: , Rfl:   •  ethambutol (MYAMBUTOL) 400 MG tablet, Take 2 tablets on Monday, Wednesday, and Friday, Disp: 24 tablet, Rfl: 4  •  finasteride (PROSCAR) 5 MG tablet, , Disp: , Rfl:   •  fluticasone (FLONASE) 50 MCG/ACT nasal spray, , Disp: , Rfl:   •  furosemide (LASIX) 20 MG tablet, furosemide 20 mg oral tablet take 1 tablet (20 mg) by oral route once daily   Active, Disp: , Rfl:   •  hydrOXYzine (ATARAX) 25 MG tablet, Take 25 mg by mouth., Disp: , Rfl:   •  ipratropium (ATROVENT) 0.06 % nasal spray, 2 sprays into the nostril(s) as directed by provider., Disp: , Rfl:   •  levoFLOXacin (LEVAQUIN) 750 MG tablet, , Disp: , Rfl:   •  lisinopril (PRINIVIL,ZESTRIL) 5 MG tablet, Take 2.5 mg by mouth Daily., Disp: , Rfl:   •  meclizine (ANTIVERT) 25 MG tablet, , Disp: , Rfl:   •  meloxicam (MOBIC) 15 MG tablet, meloxicam 15 mg oral tablet take 1 tablet (15 mg) by oral route  once daily   Active, Disp: , Rfl:   •  metoprolol succinate XL (TOPROL-XL) 25 MG 24 hr tablet, metoprolol succinate 25 mg oral tablet extended release 24 hr take 1/2 tablet by oral route twice daily.   Active, Disp: , Rfl:   •  multivitamin (THERAGRAN) tablet tablet, Take  by mouth., Disp: , Rfl:   •  multivitamin with minerals (EYE VITAMINS & MINERALS PO), , Disp: , Rfl:   •  multivitamin with minerals (MULTIVITAMIN ADULT PO), Take 1 tablet by mouth Daily., Disp: , Rfl:   •  O2 (OXYGEN), Continuous portable oxygen 2 l nasal cannula  For hypoxia copd , Life long, Disp: , Rfl:   •  omeprazole (priLOSEC) 20 MG capsule, , Disp: , Rfl:   •  rifAMPin (RIFADIN) 300 MG capsule, Take 2 tablets on Monday, Wednesday, and Friday., Disp: 24 capsule, Rfl: 4  •  tamsulosin (FLOMAX) 0.4 MG capsule 24 hr capsule, Take 0.4 mg by mouth 2 (two) times a day., Disp: , Rfl:   •  venlafaxine XR (EFFEXOR-XR) 150 MG 24 hr capsule, venlafaxine 150 mg oral capsule,extended release 24hr take 1 capsule (150 mg) by oral route once daily   Active, Disp: , Rfl:   •  vitamin B-12 (CYANOCOBALAMIN) 1000 MCG tablet, Take 2,500 mcg by mouth Daily., Disp: , Rfl:   •  clonazePAM (KlonoPIN) 0.5 MG tablet, , Disp: , Rfl:   •  fluticasone-salmeterol (ADVAIR) 250-50 MCG/DOSE DISKUS, Inhale 1 puff 2 (Two) Times a Day., Disp: 180 each, Rfl: 3  •  fluticasone-salmeterol (ADVAIR) 250-50 MCG/DOSE DISKUS, Inhale 1 puff 2 (Two) Times a Day., Disp: 60 each, Rfl: 5  •  ipratropium-albuterol (DUO-NEB) 0.5-2.5 mg/3 ml nebulizer, Take 3 mL by nebulization 4 (Four) Times a Day As Needed for Wheezing., Disp: 360 mL, Rfl: 3  •  nystatin (MYCOSTATIN) 118601 UNIT/ML suspension, , Disp: , Rfl:   •  predniSONE (DELTASONE) 10 MG (21) dose pack, Take as directed on package., Disp: , Rfl:   •  predniSONE (DELTASONE) 20 MG tablet, Take 2 tablets by mouth Daily for 7 days., Disp: 14 tablet, Rfl: 0  •  Tiotropium Bromide Monohydrate (Spiriva Respimat) 1.25 MCG/ACT aerosol solution  "inhaler, , Disp: , Rfl:   •  tiotropium bromide monohydrate (Spiriva Respimat) 2.5 MCG/ACT aerosol solution inhaler, Inhale 2 puffs Daily., Disp: 1 each, Rfl: 11  •  traMADol (ULTRAM) 50 MG tablet, , Disp: , Rfl:      Objective   Physical Exam  Constitutional:       General: He is not in acute distress.     Appearance: Normal appearance. He is normal weight.   HENT:      Right Ear: Hearing normal.      Left Ear: Hearing normal.      Nose: No nasal tenderness or congestion.      Mouth/Throat:      Mouth: Mucous membranes are moist. No oral lesions.   Eyes:      Extraocular Movements: Extraocular movements intact.      Pupils: Pupils are equal, round, and reactive to light.   Neck:      Thyroid: No thyroid mass or thyromegaly.   Cardiovascular:      Rate and Rhythm: Normal rate and regular rhythm.      Pulses: Normal pulses.      Heart sounds: Normal heart sounds. No murmur heard.     Pulmonary:      Effort: Pulmonary effort is normal.      Breath sounds: Decreased breath sounds present. No wheezing, rhonchi or rales.   Abdominal:      General: Bowel sounds are normal. There is no distension.      Palpations: Abdomen is soft.      Tenderness: There is no abdominal tenderness.   Musculoskeletal:      Cervical back: Neck supple.      Right lower leg: No edema.      Left lower leg: No edema.   Lymphadenopathy:      Cervical: No cervical adenopathy.      Upper Body:      Right upper body: No axillary adenopathy.   Skin:     General: Skin is warm and dry.      Findings: No lesion or rash.   Neurological:      General: No focal deficit present.      Mental Status: He is alert and oriented to person, place, and time.      Cranial Nerves: Cranial nerves are intact.   Psychiatric:         Mood and Affect: Affect normal. Mood is not anxious or depressed.         Vital Signs:   /68 (BP Location: Left arm, Patient Position: Sitting, Cuff Size: Adult)   Temp 97.5 °F (36.4 °C) (Oral)   Resp 17   Ht 175.3 cm (69.02\")   Wt " 61.2 kg (135 lb)   SpO2 (!) 86%   BMI 19.93 kg/m²        Result Review :     CMP    CMP 11/9/20   Glucose 82   BUN 24   Creatinine 1.21 (A)   Sodium 138   Potassium 4.3   Chloride 101   Calcium 9.3   Albumin 3.8   Total Bilirubin 0.46   Alkaline Phosphatase 71   AST (SGOT) 30   ALT (SGPT) 17   (A) Abnormal value       Comments are available for some flowsheets but are not being displayed.           CBC w/diff    CBC w/Diff 11/9/20   WBC 9.40   RBC 5.00   Hemoglobin 14.2   Hematocrit 44.0   MCV 88.0   MCH 28.4   MCHC 32.3 (A)   RDW 15.5 (A)   Platelets 182   Neutrophil Rel % 61.6   Lymphocyte Rel % 26.4   Monocyte Rel % 8.9   Eosinophil Rel % 2.2   Basophil Rel % 0.6   (A) Abnormal value            Data reviewed: Radiologic studies Chest CT 11/02/2020, PET scan 11/5/2020 and My last office note   Procedures        Assessment and Plan    Diagnoses and all orders for this visit:    1. Chronic obstructive pulmonary disease with acute exacerbation (CMS/HCC) (Primary)  -     predniSONE (DELTASONE) 20 MG tablet; Take 2 tablets by mouth Daily for 7 days.  Dispense: 14 tablet; Refill: 0  -     Home Nebulizer  -     ipratropium-albuterol (DUO-NEB) 0.5-2.5 mg/3 ml nebulizer; Take 3 mL by nebulization 4 (Four) Times a Day As Needed for Wheezing.  Dispense: 360 mL; Refill: 3  -     fluticasone-salmeterol (ADVAIR) 250-50 MCG/DOSE DISKUS; Inhale 1 puff 2 (Two) Times a Day.  Dispense: 60 each; Refill: 5  -     tiotropium bromide monohydrate (Spiriva Respimat) 2.5 MCG/ACT aerosol solution inhaler; Inhale 2 puffs Daily.  Dispense: 1 each; Refill: 11    2. Lung nodule    3. Congestive heart failure, unspecified HF chronicity, unspecified heart failure type (CMS/HCC)    4. Atrial fibrillation, unspecified type (CMS/HCC)    5. Dyspnea on exertion    6. Mycobacterium avium complex (CMS/HCC)    7. Acute respiratory failure with hypoxia (CMS/HCC)    8.  Stop Wixela and Tudorza.  9.  Start Advair and Spiriva.  Rinse mouth after each  use.  10.  Start DuoNebs as needed.  Nebulizer machine given to patient in office today and instructions on how to use it provided.  11.  Continue supplemental oxygen to maintain oxygen saturations at or above 90%.  Encourage patient to wear his oxygen more frequently.  12.  Continue ethambutol, rifampin, and azithromycin as scheduled for MAC infection.  13. Instructed patient to call the office, 911, or go to the emergency room with any new or worsening symptoms  I spent 40 minutes caring for Wesley on this date of service. This time includes time spent by me in the following activities:preparing for the visit, reviewing tests, obtaining and/or reviewing a separately obtained history, performing a medically appropriate examination and/or evaluation , counseling and educating the patient/family/caregiver, ordering medications, tests, or procedures and documenting information in the medical record    Follow Up   Return for Next scheduled follow up.  Patient was given instructions and counseling regarding his condition or for health maintenance advice. Please see specific information pulled into the AVS if appropriate.

## 2021-08-13 RX ORDER — TIOTROPIUM BROMIDE INHALATION SPRAY 3.12 UG/1
2 SPRAY, METERED RESPIRATORY (INHALATION)
Qty: 1 EACH | Refills: 0 | COMMUNITY
Start: 2021-08-13 | End: 2022-08-25 | Stop reason: SDUPTHER

## 2021-08-24 RX ORDER — APIXABAN 5 MG/1
5 TABLET, FILM COATED ORAL 2 TIMES DAILY
Qty: 60 TABLET | Refills: 0 | OUTPATIENT
Start: 2021-08-24

## 2021-09-08 ENCOUNTER — OFFICE VISIT (OUTPATIENT)
Dept: PULMONOLOGY | Facility: CLINIC | Age: 86
End: 2021-09-08

## 2021-09-08 VITALS
OXYGEN SATURATION: 96 % | HEART RATE: 65 BPM | BODY MASS INDEX: 20.73 KG/M2 | SYSTOLIC BLOOD PRESSURE: 102 MMHG | WEIGHT: 140 LBS | DIASTOLIC BLOOD PRESSURE: 71 MMHG | TEMPERATURE: 97.5 F | RESPIRATION RATE: 14 BRPM | HEIGHT: 69 IN

## 2021-09-08 DIAGNOSIS — R93.89 ABNORMAL CHEST CT: ICD-10-CM

## 2021-09-08 DIAGNOSIS — A31.0 PULMONARY MYCOBACTERIUM AVIUM COMPLEX (MAC) INFECTION (HCC): ICD-10-CM

## 2021-09-08 DIAGNOSIS — R91.1 LUNG NODULE: ICD-10-CM

## 2021-09-08 DIAGNOSIS — K21.9 GASTROESOPHAGEAL REFLUX DISEASE WITHOUT ESOPHAGITIS: ICD-10-CM

## 2021-09-08 DIAGNOSIS — I48.92 ATRIAL FLUTTER, UNSPECIFIED TYPE (HCC): Primary | ICD-10-CM

## 2021-09-08 PROBLEM — R53.82 CHRONIC FATIGUE: Status: ACTIVE | Noted: 2021-09-05

## 2021-09-08 PROBLEM — B49 FUNGAL PNEUMONIA: Status: ACTIVE | Noted: 2020-11-18

## 2021-09-08 PROBLEM — R94.8 ABNORMAL POSITRON EMISSION TOMOGRAPHY (PET) SCAN: Status: ACTIVE | Noted: 2020-11-18

## 2021-09-08 PROBLEM — F41.9 ANXIETY: Status: ACTIVE | Noted: 2021-09-08

## 2021-09-08 PROBLEM — M19.90 ARTHRITIS: Status: ACTIVE | Noted: 2021-09-08

## 2021-09-08 PROBLEM — J16.8 FUNGAL PNEUMONIA: Status: ACTIVE | Noted: 2020-11-18

## 2021-09-08 PROBLEM — R42 DIZZY: Status: ACTIVE | Noted: 2021-09-08

## 2021-09-08 PROCEDURE — 99214 OFFICE O/P EST MOD 30 MIN: CPT | Performed by: INTERNAL MEDICINE

## 2021-09-08 RX ORDER — ACLIDINIUM BROMIDE 400 UG/1
1 POWDER, METERED RESPIRATORY (INHALATION) DAILY
Qty: 1 EACH | Refills: 0 | COMMUNITY
Start: 2021-09-08 | End: 2022-08-25 | Stop reason: SDUPTHER

## 2021-09-08 NOTE — PROGRESS NOTES
Primary Care Provider  Ananth Francisco DO     Referring Provider  No ref. provider found     Chief Complaint  COPD and Follow-up    Subjective          Wesley Cunningham presents to [facility] PULMONARY & CRITICAL CARE MEDICINE  COPD  He complains of cough, shortness of breath and wheezing. Pertinent negatives include no chest pain, fever, myalgias, postnasal drip, sore throat, trouble swallowing or weight loss.     Wesley Cunningham is a 89 y.o. male with a history of mycobacterial lung infection, COPD, history of MRSA, and lung nodules.  Since his last visit with Rosie QUIROZ a month ago, he has been feeling better.  He has had a round of antibiotics.  He continues to take rifampin, ethambutol and azithromycin 3 times a week.  He has been tolerating them pretty well.  He has not had any blood work recently.  He has no significant changes in weight or appetite.  He is currently using Tudorza and Advair.  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  He continues to take Eliquis twice a day.  He has no nausea or vomiting.  No fever or chills.  He has been having constipation.  No liver problems.  No abdomen pain.  No nausea or vomiting.  He is able to form his ADLs as long as he paces himself.  He is up-to-date with his pneumonia and Covid vaccines.  He will be due for a flu vaccine this fall.      His history of smoking is      Tobacco Use: Low Risk    • Smoking Tobacco Use: Never Smoker   • Smokeless Tobacco Use: Never Used   .    Review of Systems   Constitutional: Positive for fatigue. Negative for chills, fever, unexpected weight gain and unexpected weight loss.   HENT: Negative for congestion (Nasal), hearing loss, mouth sores, nosebleeds, postnasal drip, sore throat and trouble swallowing.    Eyes: Negative for blurred vision and visual disturbance.   Respiratory: Positive for cough, shortness of breath and wheezing. Negative for apnea.         Negative for  Hemoptysis     Cardiovascular: Negative for chest pain, palpitations and leg swelling.   Gastrointestinal: Negative for abdominal pain, constipation, diarrhea, nausea, vomiting and GERD.        Negative for Jaundice  Negative for Bloating  Negative for Melena   Musculoskeletal: Negative for joint swelling and myalgias.        Negative for Joint pain  Negative for Joint stiffness   Skin: Negative for color change.        Negative for cyanosis   Neurological: Negative for syncope, weakness, numbness and headache.      Sleep: Negative for Excessive daytime sleep  Negative for for morning headaches  Negative for Snoring    History reviewed. No pertinent family history.     Social History     Socioeconomic History   • Marital status:      Spouse name: Not on file   • Number of children: Not on file   • Years of education: Not on file   • Highest education level: Not on file   Tobacco Use   • Smoking status: Never Smoker   • Smokeless tobacco: Never Used   Vaping Use   • Vaping Use: Never used   Substance and Sexual Activity   • Alcohol use: Never   • Drug use: Never   • Sexual activity: Defer        Past Medical History:   Diagnosis Date   • Anxiety    • Asthma, extrinsic    • Chronic atrial fibrillation (CMS/HCC)    • Chronic bronchitis (CMS/HCC)    • COPD (chronic obstructive pulmonary disease) (CMS/[facility])    • Depression    • Dizzy    • Emphysema of lung (CMS/HCC)    • Erectile dysfunction    • Hypertension    • Pneumonia    • Prostatitis         Immunization History   Administered Date(s) Administered   • COVID-19 (MODERNA) 02/09/2021, 03/11/2021   • Fluzone High Dose =>65 Years ([employer] ONLY) 10/06/2020         Allergies   Allergen Reactions   • Amoxicillin Anaphylaxis   • Iodinated Diagnostic Agents Anaphylaxis   • Levofloxacin Anaphylaxis   • Morphine Unknown - High Severity   • Contrast Dye Rash          Current Outpatient Medications:   •  ACIDOPHILUS LACTOBACILLUS PO, Take 1 capsule by mouth., Disp: , Rfl:    •  aclidinium bromide (TUDORZA PRESSAIR) 400 MCG/ACT aerosol powder  powder for inhalation, Inhale 1 puff 2 (Two) Times a Day., Disp: 2 each, Rfl: 0  •  azithromycin (ZITHROMAX) 500 MG tablet, , Disp: , Rfl:   •  Calcium Carbonate 1500 (600 Ca) MG tablet, Take  by mouth., Disp: , Rfl:   •  Cholecalciferol (Vitamin D3) 1.25 MG (12956 UT) capsule, , Disp: , Rfl:   •  Cholecalciferol 50 MCG (2000 UT) tablet, Take  by mouth., Disp: , Rfl:   •  clonazePAM (KlonoPIN) 0.5 MG tablet, , Disp: , Rfl:   •  coenzyme Q10 100 MG capsule, Take  by mouth Daily., Disp: , Rfl:   •  DULoxetine (Cymbalta) 60 MG capsule, , Disp: , Rfl:   •  Eliquis 5 MG tablet tablet, Take 5 mg by mouth 2 (two) times a day., Disp: , Rfl:   •  ethambutol (MYAMBUTOL) 400 MG tablet, Take 2 tablets on Monday, Wednesday, and Friday, Disp: 24 tablet, Rfl: 4  •  finasteride (PROSCAR) 5 MG tablet, , Disp: , Rfl:   •  fluticasone (FLONASE) 50 MCG/ACT nasal spray, , Disp: , Rfl:   •  fluticasone-salmeterol (ADVAIR) 250-50 MCG/DOSE DISKUS, Inhale 1 puff 2 (Two) Times a Day., Disp: 180 each, Rfl: 3  •  fluticasone-salmeterol (ADVAIR) 250-50 MCG/DOSE DISKUS, Inhale 1 puff 2 (Two) Times a Day., Disp: 60 each, Rfl: 5  •  furosemide (LASIX) 20 MG tablet, furosemide 20 mg oral tablet take 1 tablet (20 mg) by oral route once daily   Active, Disp: , Rfl:   •  ipratropium (ATROVENT) 0.06 % nasal spray, 2 sprays into the nostril(s) as directed by provider., Disp: , Rfl:   •  ipratropium-albuterol (DUO-NEB) 0.5-2.5 mg/3 ml nebulizer, Take 3 mL by nebulization 4 (Four) Times a Day As Needed for Wheezing., Disp: 360 mL, Rfl: 3  •  levoFLOXacin (LEVAQUIN) 750 MG tablet, , Disp: , Rfl:   •  lisinopril (PRINIVIL,ZESTRIL) 5 MG tablet, Take 2.5 mg by mouth Daily., Disp: , Rfl:   •  meclizine (ANTIVERT) 25 MG tablet, , Disp: , Rfl:   •  meloxicam (MOBIC) 15 MG tablet, meloxicam 15 mg oral tablet take 1 tablet (15 mg) by oral route once daily   Active, Disp: , Rfl:   •  metoprolol  succinate XL (TOPROL-XL) 25 MG 24 hr tablet, metoprolol succinate 25 mg oral tablet extended release 24 hr take 1/2 tablet by oral route twice daily.   Active, Disp: , Rfl:   •  multivitamin (THERAGRAN) tablet tablet, Take  by mouth., Disp: , Rfl:   •  multivitamin with minerals (EYE VITAMINS & MINERALS PO), , Disp: , Rfl:   •  multivitamin with minerals (MULTIVITAMIN ADULT PO), Take 1 tablet by mouth Daily., Disp: , Rfl:   •  nystatin (MYCOSTATIN) [id number] UNIT/ML suspension, , Disp: , Rfl:   •  O2 (OXYGEN), Continuous portable oxygen 2 l nasal cannula  For hypoxia copd , Life long, Disp: , Rfl:   •  omeprazole (priLOSEC) 20 MG capsule, , Disp: , Rfl:   •  predniSONE (DELTASONE) 10 MG (21) dose pack, Take as directed on package., Disp: , Rfl:   •  rifAMPin (RIFADIN) 300 MG capsule, Take 2 tablets on Monday, Wednesday, and Friday., Disp: 24 capsule, Rfl: 4  •  tamsulosin (FLOMAX) 0.4 MG capsule 24 hr capsule, Take 0.4 mg by mouth 2 (two) times a day., Disp: , Rfl:   •  Tiotropium Bromide Monohydrate (Spiriva Respimat) 1.25 MCG/ACT aerosol solution inhaler, , Disp: , Rfl:   •  tiotropium bromide monohydrate (Spiriva Respimat) 2.5 MCG/ACT aerosol solution inhaler, Inhale 2 puffs Daily., Disp: 1 each, Rfl: 11  •  tiotropium bromide monohydrate (Spiriva Respimat) 2.5 MCG/ACT aerosol solution inhaler, Inhale 2 puffs Daily for 30 days. LOT:  [id number]   EXP:  11/2022, Disp: 1 each, Rfl: 0  •  traMADol (ULTRAM) 50 MG tablet, , Disp: , Rfl:   •  venlafaxine XR (EFFEXOR-XR) 150 MG 24 hr capsule, venlafaxine 150 mg oral capsule,extended release 24hr take 1 capsule (150 mg) by oral route once daily   Active, Disp: , Rfl:   •  vitamin B-12 (CYANOCOBALAMIN) 1000 MCG tablet, Take 2,500 mcg by mouth Daily., Disp: , Rfl:      Objective   Physical Exam  Constitutional:       General: He is not in acute distress.     Appearance: Normal appearance. He is normal weight.   HENT:      Right Ear: Hearing normal.      Left Ear: Hearing normal.  "     Nose: No nasal tenderness or congestion.      Mouth/Throat:      Mouth: Mucous membranes are moist. No oral lesions.   Eyes:      Extraocular Movements: Extraocular movements intact.      Pupils: Pupils are equal, round, and reactive to light.   Neck:      Thyroid: No thyroid mass or thyromegaly.   Cardiovascular:      Rate and Rhythm: Normal rate and regular rhythm.      Pulses: Normal pulses.      Heart sounds: Normal heart sounds. No murmur heard.     Pulmonary:      Effort: Pulmonary effort is normal.      Breath sounds: Decreased breath sounds present. No wheezing, rhonchi or rales.   Abdominal:      General: Bowel sounds are normal. There is no distension.      Palpations: Abdomen is soft.      Tenderness: There is no abdominal tenderness.   Musculoskeletal:      Cervical back: Neck supple.      Right lower leg: No edema.      Left lower leg: No edema.   Lymphadenopathy:      Cervical: No cervical adenopathy.      Upper Body:      Right upper body: No axillary adenopathy.   Skin:     General: Skin is warm and dry.      Findings: No lesion or rash.   Neurological:      General: No focal deficit present.      Mental Status: He is alert and oriented to person, place, and time.      Cranial Nerves: Cranial nerves are intact.   Psychiatric:         Mood and Affect: Affect normal. Mood is not anxious or depressed.         Vital Signs:   /71 (BP Location: Right arm)   Pulse 65   Temp 97.5 °F (36.4 °C)   Resp 14   Ht 175.3 cm (69\")   Wt 63.5 kg (140 lb)   SpO2 96% Comment: room air  BMI 20.67 kg/m²        Result Review :     CMP    CMP 11/9/20   Glucose 82   BUN 24   Creatinine 1.21 (A)   Sodium 138   Potassium 4.3   Chloride 101   Calcium 9.3   Albumin 3.8   Total Bilirubin 0.46   Alkaline Phosphatase 71   AST (SGOT) 30   ALT (SGPT) 17   (A) Abnormal value       Comments are available for some flowsheets but are not being displayed.           CBC w/diff    CBC w/Diff 11/9/20   WBC 9.40   RBC 5.00 "   Hemoglobin 14.2   Hematocrit 44.0   MCV 88.0   MCH 28.4   MCHC 32.3 (A)   RDW 15.5 (A)   Platelets 182   Neutrophil Rel % 61.6   Lymphocyte Rel % 26.4   Monocyte Rel % 8.9   Eosinophil Rel % 2.2   Basophil Rel % 0.6   (A) Abnormal value            Data reviewed: Radiologic studies Chest CT 11/02/2020, PET scan 11/5/2020 and My last office note   Procedures        Assessment and Plan    Diagnoses and all orders for this visit:    1. Atrial flutter, unspecified type (CMS/HCC) (Primary)  -     CBC & Differential; Future  -     Comprehensive Metabolic Panel; Future  -     CT Chest Without Contrast; Future    2. Gastroesophageal reflux disease without esophagitis  -     CBC & Differential; Future  -     Comprehensive Metabolic Panel; Future  -     CT Chest Without Contrast; Future    3. Abnormal chest CT  -     CBC & Differential; Future  -     Comprehensive Metabolic Panel; Future  -     CT Chest Without Contrast; Future    4. 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  Rinse mouth after each use.  We have given him Tudorza sample today.  Continue DuoNebs as needed.  Nebulizer machine given to patient in office today and instructions on how to use it provided.  Continue supplemental oxygen to maintain oxygen saturations at or above 90%.  Encourage patient to wear his oxygen more frequently.  Continue ethambutol, rifampin, and azithromycin as scheduled for MAC infection.  Check CBC and CMP with drug toxicity monitoring.  Repeat CT scan of the chest in 2 months.  Keep himself as active as possible.  Continue with Eliquis twice daily.  Advised him to take Covid booster shot once his available.    Follow-up in 4 months, earlier if needed.      Follow Up   Return in about 4 months (around 1/8/2022).  Patient was given instructions and counseling  regarding his condition or for health maintenance advice. Please see specific information pulled into the AVS if appropriate.

## 2021-11-08 ENCOUNTER — HOSPITAL ENCOUNTER (OUTPATIENT)
Dept: CT IMAGING | Facility: HOSPITAL | Age: 86
Discharge: HOME | End: 2021-11-08
Admitting: INTERNAL MEDICINE

## 2021-11-08 DIAGNOSIS — I48.92 ATRIAL FLUTTER, UNSPECIFIED TYPE (HCC): ICD-10-CM

## 2021-11-08 DIAGNOSIS — A31.0 PULMONARY MYCOBACTERIUM AVIUM COMPLEX (MAC) INFECTION (HCC): ICD-10-CM

## 2021-11-08 DIAGNOSIS — R93.89 ABNORMAL CHEST CT: ICD-10-CM

## 2021-11-08 DIAGNOSIS — K21.9 GASTROESOPHAGEAL REFLUX DISEASE WITHOUT ESOPHAGITIS: ICD-10-CM

## 2021-11-08 PROCEDURE — 71250 CT THORAX DX C-: CPT

## 2022-01-10 ENCOUNTER — OFFICE VISIT (OUTPATIENT)
Dept: PULMONOLOGY | Facility: CLINIC | Age: 87
End: 2022-01-10

## 2022-01-10 VITALS
DIASTOLIC BLOOD PRESSURE: 81 MMHG | RESPIRATION RATE: 16 BRPM | SYSTOLIC BLOOD PRESSURE: 132 MMHG | HEART RATE: 84 BPM | WEIGHT: 144.8 LBS | BODY MASS INDEX: 21.95 KG/M2 | HEIGHT: 68 IN | OXYGEN SATURATION: 99 % | TEMPERATURE: 97.3 F

## 2022-01-10 DIAGNOSIS — I48.92 ATRIAL FLUTTER, UNSPECIFIED TYPE: Primary | ICD-10-CM

## 2022-01-10 DIAGNOSIS — J41.1 MUCOPURULENT CHRONIC BRONCHITIS: ICD-10-CM

## 2022-01-10 DIAGNOSIS — R91.1 LUNG NODULE: ICD-10-CM

## 2022-01-10 DIAGNOSIS — B49 FUNGAL PNEUMONIA: ICD-10-CM

## 2022-01-10 DIAGNOSIS — A31.0 PULMONARY MYCOBACTERIUM AVIUM COMPLEX (MAC) INFECTION: ICD-10-CM

## 2022-01-10 DIAGNOSIS — R93.89 ABNORMAL CHEST CT: ICD-10-CM

## 2022-01-10 DIAGNOSIS — J16.8 FUNGAL PNEUMONIA: ICD-10-CM

## 2022-01-10 DIAGNOSIS — L08.9 TOE INFECTION: ICD-10-CM

## 2022-01-10 DIAGNOSIS — K21.9 GASTROESOPHAGEAL REFLUX DISEASE WITHOUT ESOPHAGITIS: ICD-10-CM

## 2022-01-10 PROCEDURE — 99214 OFFICE O/P EST MOD 30 MIN: CPT | Performed by: INTERNAL MEDICINE

## 2022-01-10 RX ORDER — CEPHALEXIN 500 MG/1
CAPSULE ORAL
COMMUNITY
Start: 2021-11-04 | End: 2022-12-29

## 2022-01-10 RX ORDER — MIRTAZAPINE 7.5 MG/1
7.5 TABLET, FILM COATED ORAL
COMMUNITY
Start: 2021-10-22 | End: 2022-12-29

## 2022-01-10 RX ORDER — TERBINAFINE HYDROCHLORIDE 250 MG/1
250 TABLET ORAL DAILY
COMMUNITY
Start: 2021-10-22 | End: 2022-01-21

## 2022-01-10 RX ORDER — PENTOXIFYLLINE 400 MG/1
TABLET, EXTENDED RELEASE ORAL
COMMUNITY
Start: 2021-11-04

## 2022-01-10 RX ORDER — KETOCONAZOLE 20 MG/G
CREAM TOPICAL DAILY
COMMUNITY

## 2022-01-10 NOTE — PROGRESS NOTES
Primary Care Provider  Ananth Francisco DO     Referring Provider  No ref. provider found     Chief Complaint  COPD, Shortness of Breath, Wheezing, Cough, and Follow-up    Subjective          Wesley Cunningham presents to [facility] PULMONARY & CRITICAL CARE MEDICINE  History of Present Illness  Wesley Cunningham is a 89 y.o. male patient with history of mycobacterium avium complex lung infection, COPD, history of MRSA and lung nodules.  He is here for follow-up.  Continues to take rifampin, ethambutol and azithromycin 3 times a week.  He has been able to tolerate this medications.  He is also using Tudorza and Breo.  He is also on Eliquis twice daily.  Since his last office visit he had CT scan of the chest.  It shows interval decrease in size of pulmonary nodules within the right upper lobe.  All of the nodules were stable.  He also had blood work at outside facility.  It shows normal hemoglobin at 13.3 g/dL.  Normal white blood cell count at 7.1 g/dL.  His renal function and liver functions were stable.  Serum creatinine was 1.2.  AST and ALT were normal.  His, patient is feeling better.  He has some shortness of breath with exertion.  But cough and wheezing has significantly improved.  No significant phlegm.  No nausea or vomiting.  Does feel like he has lack of energy and feels tired for most part.  He does not have good appetite as well.  Does not have significant changes in weight or appetite.  He is currently taking no nebulizer.  He is complaining of infected left third and fourth toe, has been seen several wound care services in [facility] without much help.  He is complaining of significant nasal drainage as well.  He is on Flonase which has not helped as much.    Review of Systems     General:  No Fatigue, No Fever No weight loss or loss of appetite  HEENT: No dysphagia, No Visual Changes, no rhinorrhea  Respiratory:  + Minimal cough,+Dyspnea, no phlegm, No Pleuritic Pain, no  wheezing, no hemoptysis.  Cardiovascular: Denies chest pain, denies palpitations,+TRUJILLO, No Chest Pressure  Gastrointestinal:  No Abdominal Pain, No Nausea, No Vomiting, No Diarrhea  Genitourinary:  No Dysuria, No Frequency, No Hesitancy  Musculoskeletal: No muscle pain or swelling  Endocrine:  No Heat Intolerance, No Cold Intolerance  Hematologic:  No Bleeding, No Bruising  Psychiatric:  No Anxiety, No Depression  Neurologic:  No Confusion, no Dysarthria, No Headaches  Skin:  No Rash, +Open Wounds, infected toes left lower extremity    History reviewed. No pertinent family history.     Social History     Socioeconomic History   • Marital status:    Tobacco Use   • Smoking status: Never Smoker   • Smokeless tobacco: Never Used   Vaping Use   • Vaping Use: Never used   Substance and Sexual Activity   • Alcohol use: Never   • Drug use: Never   • Sexual activity: Defer        Past Medical History:   Diagnosis Date   • Anxiety    • Asthma, extrinsic    • Chronic atrial fibrillation (HCC)    • Chronic bronchitis (HCC)    • COPD (chronic obstructive pulmonary disease) ([facility])    • Depression    • Dizzy    • Emphysema of lung (HCC)    • Erectile dysfunction    • Hypertension    • Pneumonia    • Prostatitis         Immunization History   Administered Date(s) Administered   • COVID-19 (MODERNA) 1st, 2nd, 3rd Dose Only 02/09/2021, 02/09/2021, 03/11/2021, 03/11/2021   • Fluzone High Dose =>65 Years ([employer] ONLY) 10/06/2020, 10/06/2020         Allergies   Allergen Reactions   • Amoxicillin Anaphylaxis   • Iodinated Diagnostic Agents Anaphylaxis   • Levofloxacin Anaphylaxis   • Morphine Unknown - High Severity   • Contrast Dye Rash and Swelling          Current Outpatient Medications:   •  ACIDOPHILUS LACTOBACILLUS PO, Take 1 capsule by mouth., Disp: , Rfl:   •  aclidinium bromide (TUDORZA PRESSAIR) 400 MCG/ACT aerosol powder  powder for inhalation, Inhale 1 puff 2 (Two) Times a Day., Disp: 2 each, Rfl: 0  •  azithromycin  (ZITHROMAX) 500 MG tablet, , Disp: , Rfl:   •  Calcium Carbonate 1500 (600 Ca) MG tablet, Take  by mouth., Disp: , Rfl:   •  cephalexin (KEFLEX) 500 MG capsule, , Disp: , Rfl:   •  Cholecalciferol (Vitamin D3) 1.25 MG (80230 UT) capsule, , Disp: , Rfl:   •  Cholecalciferol 50 MCG (2000 UT) tablet, Take  by mouth., Disp: , Rfl:   •  coenzyme Q10 100 MG capsule, Take  by mouth Daily., Disp: , Rfl:   •  Eliquis 5 MG tablet tablet, Take 5 mg by mouth 2 (two) times a day., Disp: , Rfl:   •  ethambutol (MYAMBUTOL) 400 MG tablet, Take 2 tablets on Monday, Wednesday, and Friday, Disp: 24 tablet, Rfl: 4  •  finasteride (PROSCAR) 5 MG tablet, , Disp: , Rfl:   •  Fluticasone Furoate-Vilanterol (BREO ELLIPTA) 100-25 MCG/INH inhaler, Inhale 1 puff Daily., Disp: , Rfl:   •  furosemide (LASIX) 20 MG tablet, furosemide 20 mg oral tablet take 1 tablet (20 mg) by oral route once daily   Active, Disp: , Rfl:   •  ketoconazole (NIZORAL) 2 % cream, Apply  topically to the appropriate area as directed Daily., Disp: , Rfl:   •  lisinopril (PRINIVIL,ZESTRIL) 5 MG tablet, Take 2.5 mg by mouth Daily., Disp: , Rfl:   •  meclizine (ANTIVERT) 25 MG tablet, , Disp: , Rfl:   •  meloxicam (MOBIC) 15 MG tablet, meloxicam 15 mg oral tablet take 1 tablet (15 mg) by oral route once daily   Active, Disp: , Rfl:   •  metoprolol succinate XL (TOPROL-XL) 25 MG 24 hr tablet, metoprolol succinate 25 mg oral tablet extended release 24 hr take 1/2 tablet by oral route twice daily.   Active, Disp: , Rfl:   •  metoprolol tartrate (LOPRESSOR) 25 MG tablet, Take 12.5 mg by mouth., Disp: , Rfl:   •  mirtazapine (REMERON) 7.5 MG tablet, Take 7.5 mg by mouth., Disp: , Rfl:   •  multivitamin with minerals (EYE VITAMINS & MINERALS PO), , Disp: , Rfl:   •  multivitamin with minerals (MULTIVITAMIN ADULT PO), Take 1 tablet by mouth Daily., Disp: , Rfl:   •  omeprazole (priLOSEC) 20 MG capsule, , Disp: , Rfl:   •  rifAMPin (RIFADIN) 300 MG capsule, Take 2 tablets on  Monday, Wednesday, and Friday., Disp: 24 capsule, Rfl: 4  •  tamsulosin (FLOMAX) 0.4 MG capsule 24 hr capsule, Take 0.4 mg by mouth 2 (two) times a day., Disp: , Rfl:   •  venlafaxine XR (EFFEXOR-XR) 150 MG 24 hr capsule, venlafaxine 150 mg oral capsule,extended release 24hr take 1 capsule (150 mg) by oral route once daily   Active, Disp: , Rfl:   •  vitamin B-12 (CYANOCOBALAMIN) 1000 MCG tablet, Take 2,500 mcg by mouth Daily., Disp: , Rfl:   •  aclidinium bromide (Tudorza Pressair) 400 MCG/ACT aerosol powder  powder for inhalation, Inhale 1 puff Daily. LOT: [id number]  Expiration: 11/2022  Total:4, Disp: 1 each, Rfl: 0  •  clonazePAM (KlonoPIN) 0.5 MG tablet, , Disp: , Rfl:   •  DULoxetine (Cymbalta) 60 MG capsule, , Disp: , Rfl:   •  fluticasone (FLONASE) 50 MCG/ACT nasal spray, , Disp: , Rfl:   •  hydrocortisone-bacitracin-zinc oxide-nystatin (MAGIC BARRIER), Apply 1 application topically to the appropriate area as directed 3 (Three) Times a Day., Disp: 1 g, Rfl: 1  •  ipratropium (ATROVENT) 0.06 % nasal spray, 2 sprays into the nostril(s) as directed by provider., Disp: , Rfl:   •  ipratropium-albuterol (DUO-NEB) 0.5-2.5 mg/3 ml nebulizer, Take 3 mL by nebulization 4 (Four) Times a Day As Needed for Wheezing., Disp: 360 mL, Rfl: 3  •  multivitamin (THERAGRAN) tablet tablet, Take  by mouth., Disp: , Rfl:   •  nystatin (MYCOSTATIN) [id number] UNIT/ML suspension, , Disp: , Rfl:   •  O2 (OXYGEN), Continuous portable oxygen 2 l nasal cannula  For hypoxia copd , Life long, Disp: , Rfl:   •  pentoxifylline (TRENtal) 400 MG CR tablet, , Disp: , Rfl:   •  terbinafine (lamiSIL) 250 MG tablet, Take 250 mg by mouth Daily., Disp: , Rfl:   •  Tiotropium Bromide Monohydrate (Spiriva Respimat) 1.25 MCG/ACT aerosol solution inhaler, , Disp: , Rfl:   •  tiotropium bromide monohydrate (Spiriva Respimat) 2.5 MCG/ACT aerosol solution inhaler, Inhale 2 puffs Daily., Disp: 1 each, Rfl: 11  •  tiotropium bromide monohydrate (Spiriva Respimat)  "2.5 MCG/ACT aerosol solution inhaler, Inhale 2 puffs Daily for 30 days. LOT:  [id number]   EXP:  11/2022, Disp: 1 each, Rfl: 0  •  traMADol (ULTRAM) 50 MG tablet, , Disp: , Rfl:      Objective   Vital Signs:   /81 (BP Location: Right arm, Patient Position: Sitting, Cuff Size: Adult)   Pulse 84   Temp 97.3 °F (36.3 °C) (Tympanic)   Resp 16 Comment: room air  Ht 172.7 cm (68\")   Wt 65.7 kg (144 lb 12.8 oz)   SpO2 99% Comment: room air  BMI 22.02 kg/m²     Mallampatti classification : 1  Physical Exam  Vital Signs Reviewed  WDWN, Alert, NAD.   HEENT:  PERRL, EOMI.  OP, nares clear, no sinus tenderness  Neck:  Supple, no JVD, no thyromegaly  Lymph: no axillary, cervical, supraclavicular lymphadenopathy noted bilaterally  Chest:  good aeration, bilateral diminished breath sounds, no wheezing, crackles or rhonchi, resonant to percussion b/l  CV: Irregular, no MGR, pulses 2+, equal.  Abd:  Soft, NT, ND, + BS, no HSM  EXT:  no clubbing, no cyanosis, No BLE edema, erythematous, indurated and with some secretions on crevices between third and fourth left toes  Neuro:  A&Ox3, CN grossly intact, no focal deficits.  Skin: No rashes or lesions noted, open left toes wound     Result Review :   The following data was reviewed by: Blu Delgado MD on 01/10/2022:    CBC and CMP from 11/10/2021 done at [facility] was reviewed.  It shows a stable renal function with normal serum creatinine at 1.2.  Normal liver function with normal transaminases.  Normal CBC.            Data reviewed: Radiologic studies CT scan of the chest from November 20,021 shows stable lung nodules and improved nodular density from right upper lobe.            Assessment and Plan    Diagnoses and all orders for this visit:    1. Atrial flutter, unspecified type (HCC) (Primary)    2. Gastroesophageal reflux disease without esophagitis    3. Lung nodule    4. Mucopurulent chronic bronchitis (HCC)    5. Abnormal chest CT    6. Pulmonary " Mycobacterium avium complex (MAC) infection (HCC)    7. Fungal pneumonia    8. Toe infection  -     hydrocortisone-bacitracin-zinc oxide-nystatin (MAGIC BARRIER); Apply 1 application topically to the appropriate area as directed 3 (Three) Times a Day.  Dispense: 1 g; Refill: 1      Continue with rifampin 600 mg 3 times weekly, ethambutol 800 mg 3 times weekly, azithromycin 500 mg 3 times weekly.  Liver and renal function are stable CBC count is normal.  CT scan shows improved lung nodules.  We will order labs including CBC, CMP and repeat CT scan on next office visit.    Continue with Breo and Tudorza.  Use albuterol as needed.  Continue with albuterol-Atrovent as needed.  Continue oxygen to keep saturations more than 90% with sleep and activities.  Continue Eliquis 5 mg twice daily.    Continue Flonase nasal spray.  Add azelastine nasal spray.  I have given him Magic barrier for his to infection.  If it is not helping, will need wound culture.  He is up-to-date on flu, pneumonia and COVID-vaccine including booster dose.  He is able to tolerate triple antibiotics for his MAC infection.    Follow Up   Return in about 6 months (around 7/10/2022).  Patient was given instructions and counseling regarding his condition or for health maintenance advice. Please see specific information pulled into the AVS if appropriate.       Electronically signed by Blu Delgado MD, 1/10/2022, 11:43 EST.

## 2022-01-11 DIAGNOSIS — L08.9 TOE INFECTION: ICD-10-CM

## 2022-06-30 DIAGNOSIS — A31.0 MYCOBACTERIUM AVIUM INFECTION: ICD-10-CM

## 2022-06-30 RX ORDER — RIFAMPIN 300 MG/1
CAPSULE ORAL
Qty: 24 CAPSULE | Refills: 4 | Status: SHIPPED | OUTPATIENT
Start: 2022-06-30 | End: 2022-12-19

## 2022-06-30 RX ORDER — AZITHROMYCIN 500 MG/1
500 TABLET, FILM COATED ORAL TAKE AS DIRECTED
Qty: 12 TABLET | Refills: 3 | Status: SHIPPED | OUTPATIENT
Start: 2022-06-30 | End: 2022-07-28

## 2022-08-02 DIAGNOSIS — A31.0 MYCOBACTERIUM AVIUM INFECTION: ICD-10-CM

## 2022-08-02 RX ORDER — ETHAMBUTOL HYDROCHLORIDE 400 MG/1
TABLET, FILM COATED ORAL
Qty: 24 TABLET | Refills: 4 | Status: SHIPPED | OUTPATIENT
Start: 2022-08-02 | End: 2022-12-29 | Stop reason: SDUPTHER

## 2022-08-25 ENCOUNTER — LAB (OUTPATIENT)
Dept: LAB | Facility: HOSPITAL | Age: 87
End: 2022-08-25

## 2022-08-25 ENCOUNTER — OFFICE VISIT (OUTPATIENT)
Dept: PULMONOLOGY | Facility: CLINIC | Age: 87
End: 2022-08-25

## 2022-08-25 VITALS
HEART RATE: 76 BPM | WEIGHT: 141.4 LBS | OXYGEN SATURATION: 97 % | TEMPERATURE: 97.3 F | SYSTOLIC BLOOD PRESSURE: 126 MMHG | HEIGHT: 68 IN | DIASTOLIC BLOOD PRESSURE: 78 MMHG | BODY MASS INDEX: 21.43 KG/M2 | RESPIRATION RATE: 16 BRPM

## 2022-08-25 DIAGNOSIS — R91.1 LUNG NODULE: ICD-10-CM

## 2022-08-25 DIAGNOSIS — J41.1 MUCOPURULENT CHRONIC BRONCHITIS: ICD-10-CM

## 2022-08-25 DIAGNOSIS — R42 DIZZY: ICD-10-CM

## 2022-08-25 DIAGNOSIS — J16.8 FUNGAL PNEUMONIA: ICD-10-CM

## 2022-08-25 DIAGNOSIS — R93.89 ABNORMAL CHEST CT: Primary | ICD-10-CM

## 2022-08-25 DIAGNOSIS — R93.89 ABNORMAL CHEST CT: ICD-10-CM

## 2022-08-25 DIAGNOSIS — R53.82 CHRONIC FATIGUE: ICD-10-CM

## 2022-08-25 DIAGNOSIS — A31.0 PULMONARY MYCOBACTERIUM AVIUM COMPLEX (MAC) INFECTION: ICD-10-CM

## 2022-08-25 DIAGNOSIS — B49 FUNGAL PNEUMONIA: ICD-10-CM

## 2022-08-25 DIAGNOSIS — I48.92 ATRIAL FLUTTER, UNSPECIFIED TYPE: ICD-10-CM

## 2022-08-25 DIAGNOSIS — K21.9 GASTROESOPHAGEAL REFLUX DISEASE WITHOUT ESOPHAGITIS: ICD-10-CM

## 2022-08-25 LAB
ALBUMIN SERPL-MCNC: 4 G/DL (ref 3.5–5.2)
ALBUMIN/GLOB SERPL: 1.6 G/DL
ALP SERPL-CCNC: 73 U/L (ref 39–117)
ALT SERPL W P-5'-P-CCNC: 14 U/L (ref 1–41)
ANION GAP SERPL CALCULATED.3IONS-SCNC: 9 MMOL/L (ref 5–15)
AST SERPL-CCNC: 26 U/L (ref 1–40)
BASOPHILS # BLD AUTO: 0.05 10*3/MM3 (ref 0–0.2)
BASOPHILS NFR BLD AUTO: 0.7 % (ref 0–1.5)
BILIRUB SERPL-MCNC: 0.4 MG/DL (ref 0–1.2)
BUN SERPL-MCNC: 20 MG/DL (ref 8–23)
BUN/CREAT SERPL: 19 (ref 7–25)
CALCIUM SPEC-SCNC: 9.3 MG/DL (ref 8.2–9.6)
CHLORIDE SERPL-SCNC: 101 MMOL/L (ref 98–107)
CO2 SERPL-SCNC: 29 MMOL/L (ref 22–29)
CREAT SERPL-MCNC: 1.05 MG/DL (ref 0.76–1.27)
DEPRECATED RDW RBC AUTO: 40.2 FL (ref 37–54)
EGFRCR SERPLBLD CKD-EPI 2021: 67.4 ML/MIN/1.73
EOSINOPHIL # BLD AUTO: 0.23 10*3/MM3 (ref 0–0.4)
EOSINOPHIL NFR BLD AUTO: 3.2 % (ref 0.3–6.2)
ERYTHROCYTE [DISTWIDTH] IN BLOOD BY AUTOMATED COUNT: 12.7 % (ref 12.3–15.4)
GLOBULIN UR ELPH-MCNC: 2.5 GM/DL
GLUCOSE SERPL-MCNC: 59 MG/DL (ref 65–99)
HCT VFR BLD AUTO: 41.4 % (ref 37.5–51)
HGB BLD-MCNC: 14.2 G/DL (ref 13–17.7)
IMM GRANULOCYTES # BLD AUTO: 0.02 10*3/MM3 (ref 0–0.05)
IMM GRANULOCYTES NFR BLD AUTO: 0.3 % (ref 0–0.5)
LYMPHOCYTES # BLD AUTO: 2.55 10*3/MM3 (ref 0.7–3.1)
LYMPHOCYTES NFR BLD AUTO: 35.8 % (ref 19.6–45.3)
MCH RBC QN AUTO: 29.7 PG (ref 26.6–33)
MCHC RBC AUTO-ENTMCNC: 34.3 G/DL (ref 31.5–35.7)
MCV RBC AUTO: 86.6 FL (ref 79–97)
MONOCYTES # BLD AUTO: 0.76 10*3/MM3 (ref 0.1–0.9)
MONOCYTES NFR BLD AUTO: 10.7 % (ref 5–12)
NEUTROPHILS NFR BLD AUTO: 3.51 10*3/MM3 (ref 1.7–7)
NEUTROPHILS NFR BLD AUTO: 49.3 % (ref 42.7–76)
NRBC BLD AUTO-RTO: 0 /100 WBC (ref 0–0.2)
PLATELET # BLD AUTO: 206 10*3/MM3 (ref 140–450)
PMV BLD AUTO: 11.4 FL (ref 6–12)
POTASSIUM SERPL-SCNC: 4.1 MMOL/L (ref 3.5–5.2)
PROT SERPL-MCNC: 6.5 G/DL (ref 6–8.5)
RBC # BLD AUTO: 4.78 10*6/MM3 (ref 4.14–5.8)
SODIUM SERPL-SCNC: 139 MMOL/L (ref 136–145)
WBC NRBC COR # BLD: 7.12 10*3/MM3 (ref 3.4–10.8)

## 2022-08-25 PROCEDURE — 99214 OFFICE O/P EST MOD 30 MIN: CPT | Performed by: INTERNAL MEDICINE

## 2022-08-25 PROCEDURE — 85025 COMPLETE CBC W/AUTO DIFF WBC: CPT

## 2022-08-25 PROCEDURE — 80053 COMPREHEN METABOLIC PANEL: CPT

## 2022-08-25 PROCEDURE — 36415 COLL VENOUS BLD VENIPUNCTURE: CPT

## 2022-08-25 RX ORDER — VENLAFAXINE HYDROCHLORIDE 150 MG/1
150 CAPSULE, EXTENDED RELEASE ORAL DAILY
COMMUNITY
Start: 2022-08-04

## 2022-08-25 RX ORDER — DICYCLOMINE HCL 20 MG
20 TABLET ORAL
COMMUNITY
Start: 2022-07-13

## 2022-08-25 RX ORDER — FLUDROCORTISONE ACETATE 0.1 MG/1
0.1 TABLET ORAL DAILY
Qty: 30 TABLET | Refills: 0 | Status: SHIPPED | OUTPATIENT
Start: 2022-08-25 | End: 2022-12-29 | Stop reason: SDUPTHER

## 2022-08-25 RX ORDER — FINASTERIDE 5 MG/1
5 TABLET, FILM COATED ORAL
COMMUNITY
Start: 2022-08-04 | End: 2022-08-25 | Stop reason: SDUPTHER

## 2022-08-25 RX ORDER — MIRTAZAPINE 15 MG/1
15 TABLET, FILM COATED ORAL
COMMUNITY
Start: 2022-04-27 | End: 2022-12-29 | Stop reason: SDUPTHER

## 2022-08-25 RX ORDER — OMEPRAZOLE 20 MG/1
20 CAPSULE, DELAYED RELEASE ORAL DAILY
COMMUNITY
Start: 2022-08-04

## 2022-08-25 RX ORDER — FLUTICASONE PROPIONATE AND SALMETEROL XINAFOATE 230; 21 UG/1; UG/1
2 AEROSOL, METERED RESPIRATORY (INHALATION)
Qty: 1 EACH | Refills: 11 | Status: SHIPPED | OUTPATIENT
Start: 2022-08-25 | End: 2022-12-29 | Stop reason: SDUPTHER

## 2022-08-25 RX ORDER — FLUTICASONE PROPIONATE AND SALMETEROL 250; 50 UG/1; UG/1
POWDER RESPIRATORY (INHALATION)
COMMUNITY
Start: 2022-07-26 | End: 2022-08-25

## 2022-08-25 RX ORDER — CLINDAMYCIN HYDROCHLORIDE 300 MG/1
CAPSULE ORAL
COMMUNITY
Start: 2022-07-05 | End: 2022-12-29

## 2022-08-25 RX ORDER — FUROSEMIDE 20 MG/1
20 TABLET ORAL DAILY
COMMUNITY
Start: 2022-08-04 | End: 2022-08-25 | Stop reason: SDUPTHER

## 2022-08-25 RX ORDER — AZITHROMYCIN 500 MG/1
TABLET, FILM COATED ORAL
COMMUNITY
Start: 2022-07-29 | End: 2022-11-07 | Stop reason: SDUPTHER

## 2022-08-25 RX ORDER — BUSPIRONE HYDROCHLORIDE 5 MG/1
TABLET ORAL
COMMUNITY
Start: 2022-07-29 | End: 2022-08-25 | Stop reason: SDUPTHER

## 2022-08-25 RX ORDER — BUSPIRONE HYDROCHLORIDE 5 MG/1
5 TABLET ORAL 3 TIMES DAILY
COMMUNITY
Start: 2022-07-13 | End: 2023-07-14

## 2022-08-25 RX ORDER — MECLIZINE HYDROCHLORIDE 25 MG/1
25 TABLET ORAL
COMMUNITY
Start: 2022-08-04

## 2022-08-25 RX ORDER — SULFAMETHOXAZOLE AND TRIMETHOPRIM 800; 160 MG/1; MG/1
TABLET ORAL
COMMUNITY
Start: 2022-06-22 | End: 2022-12-29

## 2022-08-25 RX ORDER — DICYCLOMINE HCL 20 MG
TABLET ORAL
COMMUNITY
Start: 2022-07-13 | End: 2022-08-25 | Stop reason: SDUPTHER

## 2022-08-25 NOTE — PROGRESS NOTES
Primary Care Provider  Ananth Francisco DO     Referring Provider  No ref. provider found     Chief Complaint  COPD, Pneumonia, and Follow-up (6 mo f/up)    Subjective          Wesley Cunningham presents to [facility]  History of Present Illness  Wesley Cunningham is a 89 y.o. male patient with history of mycobacterium avium complex lung infection, COPD, history of MRSA pneumonia and lung nodules.  He is here for follow-up.  Continues to take rifampin, ethambutol and azithromycin 3 times a week.  He has been able to tolerate this medications.  He was usingTudorza and Breo. But has stopped tudorza now. Also running out of Breo. He is also on Eliquis twice daily. Previous CT chest showed decrease in size of pulmonary nodules within the right upper lobe. All of the nodules were stable.  Previous CBC and renal function and liver functions were stable.  Serum creatinine was 1.2.  AST and ALT were normal.  Patient feels fatigued all the time.  He has some shortness of breath with exertion.  But cough and wheezing has significantly has been stable.  No significant phlegm.  No nausea or vomiting.  Does feel like he has lack of energy and feels tired for most part.  He does not have good appetite as well.  No significant changes in weight or appetite.  He is currently taking no nebulizer but has nebulizer machine at home.  Still has some nasal drainage.  He is on Flonase which has not helped as much.    Review of Systems     General:  No Fatigue, No Fever No weight loss or loss of appetite  HEENT: No dysphagia, No Visual Changes, no rhinorrhea  Respiratory:  + Minimal cough,+Dyspnea, no phlegm, No Pleuritic Pain, no wheezing, no hemoptysis.  Cardiovascular: Denies chest pain, denies palpitations,+TRUJILLO, No Chest Pressure  Gastrointestinal:  No Abdominal Pain, No Nausea, No Vomiting, No Diarrhea  Genitourinary:  No Dysuria, No Frequency, No Hesitancy  Musculoskeletal: No  muscle pain or swelling  Endocrine:  No Heat Intolerance, No Cold Intolerance  Hematologic:  No Bleeding, No Bruising  Psychiatric:  No Anxiety, No Depression  Neurologic:  No Confusion, no Dysarthria, No Headaches  Skin:  No Rash, +Open Wounds, infected toes left lower extremity    History reviewed. No pertinent family history.     Social History     Socioeconomic History   • Marital status:    Tobacco Use   • Smoking status: Never Smoker   • Smokeless tobacco: Never Used   Vaping Use   • Vaping Use: Never used   Substance and Sexual Activity   • Alcohol use: Never   • Drug use: Never   • Sexual activity: Defer        Past Medical History:   Diagnosis Date   • Anxiety    • Asthma, extrinsic    • Chronic atrial fibrillation (HCC)    • Chronic bronchitis (HCC)    • COPD (chronic obstructive pulmonary disease) (HCC)    • Depression    • Dizzy    • Emphysema of lung (HCC)    • Erectile dysfunction    • Hypertension    • Pneumonia    • Prostatitis         Immunization History   Administered Date(s) Administered   • COVID-19 (MODERNA) 1st, 2nd, 3rd Dose Only 02/09/2021, 02/09/2021, 03/11/2021, 03/11/2021, 05/09/2022   • COVID-19 (MODERNA) BOOSTER 10/28/2021   • Fluzone High Dose =>65 Years ([employer] ONLY) 10/06/2020, 10/06/2020         Allergies   Allergen Reactions   • Amoxicillin Anaphylaxis   • Iodinated Diagnostic Agents Anaphylaxis   • Levofloxacin Anaphylaxis and Nausea And Vomiting   • Morphine Unknown - High Severity and Unknown (See Comments)     Other reaction(s): Unknown - High Severity   • Contrast Dye Rash and Swelling          Current Outpatient Medications:   •  ACIDOPHILUS LACTOBACILLUS PO, Take 1 capsule by mouth., Disp: , Rfl:   •  apixaban (ELIQUIS) 5 MG tablet tablet, Take 1 tablet by mouth 2 (Two) Times a Day., Disp: , Rfl:   •  azithromycin (ZITHROMAX) 500 MG tablet, , Disp: , Rfl:   •  busPIRone (BUSPAR) 5 MG tablet, Take 5 mg by mouth 3 (Three) Times a Day., Disp: , Rfl:   •  Calcium  Carbonate 1500 (600 Ca) MG tablet, Take  by mouth., Disp: , Rfl:   •  cephalexin (KEFLEX) 500 MG capsule, , Disp: , Rfl:   •  Cholecalciferol (Vitamin D3) 1.25 MG (31235 UT) capsule, , Disp: , Rfl:   •  Cholecalciferol 50 MCG (2000 UT) tablet, Take  by mouth., Disp: , Rfl:   •  clindamycin (CLEOCIN) 300 MG capsule, , Disp: , Rfl:   •  clonazePAM (KlonoPIN) 0.5 MG tablet, , Disp: , Rfl:   •  coenzyme Q10 100 MG capsule, Take  by mouth Daily., Disp: , Rfl:   •  dicyclomine (BENTYL) 20 MG tablet, Take 20 mg by mouth., Disp: , Rfl:   •  DULoxetine (CYMBALTA) 60 MG capsule, , Disp: , Rfl:   •  Eliquis 5 MG tablet tablet, Take 5 mg by mouth 2 (two) times a day., Disp: , Rfl:   •  ethambutol (MYAMBUTOL) 400 MG tablet, Take 2 tablets on Monday, Wednesday, and Friday, Disp: 24 tablet, Rfl: 4  •  finasteride (PROSCAR) 5 MG tablet, , Disp: , Rfl:   •  fluticasone (FLONASE) 50 MCG/ACT nasal spray, , Disp: , Rfl:   •  Fluticasone Furoate-Vilanterol (BREO ELLIPTA) 100-25 MCG/INH inhaler, Inhale 1 puff Daily., Disp: , Rfl:   •  furosemide (LASIX) 20 MG tablet, furosemide 20 mg oral tablet take 1 tablet (20 mg) by oral route once daily   Active, Disp: , Rfl:   •  hydrocortisone-bacitracin-zinc oxide-nystatin (MAGIC BARRIER), Apply 1 application topically to the appropriate area as directed 3 (Three) Times a Day., Disp: 1 g, Rfl: 1  •  ipratropium (ATROVENT) 0.06 % nasal spray, 2 sprays into the nostril(s) as directed by provider., Disp: , Rfl:   •  ipratropium-albuterol (DUO-NEB) 0.5-2.5 mg/3 ml nebulizer, Take 3 mL by nebulization 4 (Four) Times a Day As Needed for Wheezing., Disp: 360 mL, Rfl: 3  •  ketoconazole (NIZORAL) 2 % cream, Apply  topically to the appropriate area as directed Daily., Disp: , Rfl:   •  lisinopril (PRINIVIL,ZESTRIL) 5 MG tablet, Take 2.5 mg by mouth Daily., Disp: , Rfl:   •  meclizine (ANTIVERT) 25 MG tablet, Take 25 mg by mouth., Disp: , Rfl:   •  meloxicam (MOBIC) 15 MG tablet, meloxicam 15 mg oral  tablet take 1 tablet (15 mg) by oral route once daily   Active, Disp: , Rfl:   •  metoprolol succinate XL (TOPROL-XL) 25 MG 24 hr tablet, metoprolol succinate 25 mg oral tablet extended release 24 hr take 1/2 tablet by oral route twice daily.   Active, Disp: , Rfl:   •  metoprolol tartrate (LOPRESSOR) 25 MG tablet, Take 12.5 mg by mouth., Disp: , Rfl:   •  mirtazapine (REMERON) 15 MG tablet, Take 15 mg by mouth., Disp: , Rfl:   •  multivitamin (THERAGRAN) tablet tablet, Take  by mouth., Disp: , Rfl:   •  multivitamin with minerals tablet tablet, Take 1 tablet by mouth Daily., Disp: , Rfl:   •  nystatin (MYCOSTATIN) 974271 UNIT/ML suspension, , Disp: , Rfl:   •  omeprazole (priLOSEC) 20 MG capsule, Take 20 mg by mouth Daily., Disp: , Rfl:   •  pentoxifylline (TRENtal) 400 MG CR tablet, , Disp: , Rfl:   •  rifAMPin (RIFADIN) 300 MG capsule, Take 2 tablets on Monday, Wednesday, and Friday., Disp: 24 capsule, Rfl: 4  •  sulfamethoxazole-trimethoprim (BACTRIM DS,SEPTRA DS) 800-160 MG per tablet, , Disp: , Rfl:   •  tamsulosin (FLOMAX) 0.4 MG capsule 24 hr capsule, Take 0.4 mg by mouth 2 (two) times a day., Disp: , Rfl:   •  traMADol (ULTRAM) 50 MG tablet, , Disp: , Rfl:   •  triamcinolone (KENALOG) 0.1 % ointment, Apply  topically to the appropriate area as directed., Disp: , Rfl:   •  venlafaxine XR (EFFEXOR-XR) 150 MG 24 hr capsule, Take 150 mg by mouth Daily., Disp: , Rfl:   •  vitamin B-12 (CYANOCOBALAMIN) 1000 MCG tablet, Take 2,500 mcg by mouth Daily., Disp: , Rfl:   •  fludrocortisone 0.1 MG tablet, Take 1 tablet by mouth Daily., Disp: 30 tablet, Rfl: 0  •  fluticasone-salmeterol (Advair HFA) 230-21 MCG/ACT inhaler, Inhale 2 puffs 2 (Two) Times a Day., Disp: 1 each, Rfl: 11  •  ipratropium (ATROVENT) 0.02 % nebulizer solution, Take 2.5 mL by nebulization 4 (Four) Times a Day As Needed for Wheezing or Shortness of Air., Disp: 12.5 mL, Rfl: 5  •  mirtazapine (REMERON) 7.5 MG tablet, Take 7.5 mg by mouth., Disp: ,  "Rfl:      Objective   Vital Signs:   /78 (BP Location: Left arm, Patient Position: Sitting, Cuff Size: Adult)   Pulse 76   Temp 97.3 °F (36.3 °C) (Temporal)   Resp 16   Ht 172.7 cm (68\")   Wt 64.1 kg (141 lb 6.4 oz)   SpO2 97% Comment: room air  BMI 21.50 kg/m²     Mallampatti classification : 1  Physical Exam  Vital Signs Reviewed  WDWN, Alert, NAD.   HEENT:  PERRL, EOMI.  OP, nares clear, no sinus tenderness  Neck:  Supple, no JVD, no thyromegaly  Lymph: no axillary, cervical, supraclavicular lymphadenopathy noted bilaterally  Chest:  good aeration, bilateral diminished breath sounds, no wheezing, crackles or rhonchi, resonant to percussion b/l  CV: Irregular, no MGR, pulses 2+, equal.  Abd:  Soft, NT, ND, + BS, no HSM  EXT:  no clubbing, no cyanosis, No BLE edema, erythematous, indurated and with some secretions on crevices between third and fourth left toes  Neuro:  A&Ox3, CN grossly intact, no focal deficits.  Skin: No rashes or lesions noted, open left toes wound     Result Review :   The following data was reviewed by: Blu Delgado MD on 01/10/2022:    CBC and CMP from 11/10/2021 done at [facility] was reviewed.  It shows a stable renal function with normal serum creatinine at 1.2.  Normal liver function with normal transaminases.  Normal CBC.            Data reviewed: Radiologic studies CT scan of the chest from November 20,021 shows stable lung nodules and improved nodular density from right upper lobe.            Assessment and Plan    Diagnoses and all orders for this visit:    1. Abnormal chest CT (Primary)  -     CBC & Differential; Future  -     Comprehensive Metabolic Panel; Future  -     CT Chest Without Contrast; Future  -     ipratropium (ATROVENT) 0.02 % nebulizer solution; Take 2.5 mL by nebulization 4 (Four) Times a Day As Needed for Wheezing or Shortness of Air.  Dispense: 12.5 mL; Refill: 5  -     fluticasone-salmeterol (Advair HFA) 230-21 MCG/ACT inhaler; Inhale 2 " puffs 2 (Two) Times a Day.  Dispense: 1 each; Refill: 11  -     fludrocortisone 0.1 MG tablet; Take 1 tablet by mouth Daily.  Dispense: 30 tablet; Refill: 0    2. Mucopurulent chronic bronchitis (HCC)  -     CBC & Differential; Future  -     Comprehensive Metabolic Panel; Future  -     CT Chest Without Contrast; Future  -     ipratropium (ATROVENT) 0.02 % nebulizer solution; Take 2.5 mL by nebulization 4 (Four) Times a Day As Needed for Wheezing or Shortness of Air.  Dispense: 12.5 mL; Refill: 5  -     fluticasone-salmeterol (Advair HFA) 230-21 MCG/ACT inhaler; Inhale 2 puffs 2 (Two) Times a Day.  Dispense: 1 each; Refill: 11  -     fludrocortisone 0.1 MG tablet; Take 1 tablet by mouth Daily.  Dispense: 30 tablet; Refill: 0    3. Lung nodule  -     CBC & Differential; Future  -     Comprehensive Metabolic Panel; Future  -     CT Chest Without Contrast; Future  -     ipratropium (ATROVENT) 0.02 % nebulizer solution; Take 2.5 mL by nebulization 4 (Four) Times a Day As Needed for Wheezing or Shortness of Air.  Dispense: 12.5 mL; Refill: 5  -     fluticasone-salmeterol (Advair HFA) 230-21 MCG/ACT inhaler; Inhale 2 puffs 2 (Two) Times a Day.  Dispense: 1 each; Refill: 11  -     fludrocortisone 0.1 MG tablet; Take 1 tablet by mouth Daily.  Dispense: 30 tablet; Refill: 0    4. Chronic fatigue  -     CBC & Differential; Future  -     Comprehensive Metabolic Panel; Future  -     CT Chest Without Contrast; Future  -     ipratropium (ATROVENT) 0.02 % nebulizer solution; Take 2.5 mL by nebulization 4 (Four) Times a Day As Needed for Wheezing or Shortness of Air.  Dispense: 12.5 mL; Refill: 5  -     fluticasone-salmeterol (Advair HFA) 230-21 MCG/ACT inhaler; Inhale 2 puffs 2 (Two) Times a Day.  Dispense: 1 each; Refill: 11  -     fludrocortisone 0.1 MG tablet; Take 1 tablet by mouth Daily.  Dispense: 30 tablet; Refill: 0    5. Dizzy  -     CBC & Differential; Future  -     Comprehensive Metabolic Panel; Future  -     CT Chest  Without Contrast; Future  -     ipratropium (ATROVENT) 0.02 % nebulizer solution; Take 2.5 mL by nebulization 4 (Four) Times a Day As Needed for Wheezing or Shortness of Air.  Dispense: 12.5 mL; Refill: 5  -     fluticasone-salmeterol (Advair HFA) 230-21 MCG/ACT inhaler; Inhale 2 puffs 2 (Two) Times a Day.  Dispense: 1 each; Refill: 11  -     fludrocortisone 0.1 MG tablet; Take 1 tablet by mouth Daily.  Dispense: 30 tablet; Refill: 0    6. 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  Dispense: 12.5 mL; Refill: 5  -     fluticasone-salmeterol (Advair HFA) 230-21 MCG/ACT inhaler; Inhale 2 puffs 2 (Two) Times a Day.  Dispense: 1 each; Refill: 11  -     fludrocortisone 0.1 MG tablet; Take 1 tablet by mouth Daily.  Dispense: 30 tablet; Refill: 0    7. Fungal pneumonia  -     CBC & Differential; Future  -     Comprehensive Metabolic Panel; Future  -     CT Chest Without Contrast; Future  -     ipratropium (ATROVENT) 0.02 % nebulizer solution; Take 2.5 mL by nebulization 4 (Four) Times a Day As Needed for Wheezing or Shortness of Air.  Dispense: 12.5 mL; Refill: 5  -     fluticasone-salmeterol (Advair HFA) 230-21 MCG/ACT inhaler; Inhale 2 puffs 2 (Two) Times a Day.  Dispense: 1 each; Refill: 11  -     fludrocortisone 0.1 MG tablet; Take 1 tablet by mouth Daily.  Dispense: 30 tablet; Refill: 0    8. Gastroesophageal reflux disease without esophagitis  -     CBC & Differential; Future  -     Comprehensive Metabolic Panel; Future  -     CT Chest Without Contrast; Future  -     ipratropium (ATROVENT) 0.02 % nebulizer solution; Take 2.5 mL by nebulization 4 (Four) Times a Day As Needed for Wheezing or Shortness of Air.  Dispense: 12.5 mL; Refill:  5  -     fluticasone-salmeterol (Advair HFA) 230-21 MCG/ACT inhaler; Inhale 2 puffs 2 (Two) Times a Day.  Dispense: 1 each; Refill: 11  -     fludrocortisone 0.1 MG tablet; Take 1 tablet by mouth Daily.  Dispense: 30 tablet; Refill: 0      Continue with rifampin 600 mg 3 times weekly, ethambutol 800 mg 3 times weekly, azithromycin 500 mg 3 times weekly.  Liver and renal function are stable CBC count is normal.  CT scan shows improved lung nodules.  We will order labs including CBC, CMP and repeat CT scan of the chest now.    Switch Breo and Tudorza to Advair and Atrovent.  Use albuterol as needed.    Continue oxygen to keep saturations more than 90% with sleep and activities.  Continue Eliquis 5 mg twice daily.    Continue Flonase nasal spray.  Continue azelastine nasal spray.  Start Florinef 0.1 milligrams once daily.  He is up-to-date on flu, pneumonia and COVID-vaccine including booster dose.  He is able to tolerate triple antibiotics for his MAC infection.    Follow Up   In 3 months.  Patient was given instructions and counseling regarding his condition or for health maintenance advice. Please see specific information pulled into the AVS if appropriate.       Electronically signed by Blu Delgado MD, 8/25/2022, 16:48 EDT.

## 2022-08-29 ENCOUNTER — TELEPHONE (OUTPATIENT)
Dept: PULMONOLOGY | Facility: CLINIC | Age: 87
End: 2022-08-29

## 2022-08-29 NOTE — TELEPHONE ENCOUNTER
Patient called in regards to a chest ct that he was called about today to schedule. Patient is wanting to have his ct done at [facility] because he thinks it would be done sooner. Please reach out to the patient in regards to this. Thank you.

## 2022-09-23 ENCOUNTER — APPOINTMENT (OUTPATIENT)
Dept: CT IMAGING | Facility: HOSPITAL | Age: 87
End: 2022-09-23

## 2022-11-07 RX ORDER — AZITHROMYCIN 500 MG/1
500 TABLET, FILM COATED ORAL SEE ADMIN INSTRUCTIONS
Qty: 12 TABLET | Refills: 5 | Status: SHIPPED | OUTPATIENT
Start: 2022-11-07 | End: 2022-12-29 | Stop reason: SDUPTHER

## 2022-12-19 DIAGNOSIS — A31.0 MYCOBACTERIUM AVIUM INFECTION: ICD-10-CM

## 2022-12-19 RX ORDER — RIFAMPIN 300 MG/1
CAPSULE ORAL
Qty: 24 CAPSULE | Refills: 4 | Status: SHIPPED | OUTPATIENT
Start: 2022-12-19 | End: 2022-12-29 | Stop reason: SDUPTHER

## 2022-12-29 ENCOUNTER — OFFICE VISIT (OUTPATIENT)
Dept: PULMONOLOGY | Facility: CLINIC | Age: 87
End: 2022-12-29

## 2022-12-29 ENCOUNTER — HOSPITAL ENCOUNTER (OUTPATIENT)
Dept: CT IMAGING | Facility: HOSPITAL | Age: 87
Discharge: HOME | End: 2022-12-29
Payer: MEDICARE

## 2022-12-29 ENCOUNTER — LAB (OUTPATIENT)
Dept: LAB | Facility: HOSPITAL | Age: 87
End: 2022-12-29
Payer: MEDICARE

## 2022-12-29 VITALS
RESPIRATION RATE: 18 BRPM | BODY MASS INDEX: 21 KG/M2 | OXYGEN SATURATION: 97 % | WEIGHT: 141.8 LBS | SYSTOLIC BLOOD PRESSURE: 105 MMHG | DIASTOLIC BLOOD PRESSURE: 63 MMHG | HEIGHT: 69 IN | HEART RATE: 77 BPM | TEMPERATURE: 97.8 F

## 2022-12-29 DIAGNOSIS — R42 DIZZY: ICD-10-CM

## 2022-12-29 DIAGNOSIS — R93.89 ABNORMAL CHEST CT: ICD-10-CM

## 2022-12-29 DIAGNOSIS — A31.0 MYCOBACTERIUM AVIUM INFECTION: ICD-10-CM

## 2022-12-29 DIAGNOSIS — R91.1 LUNG NODULE: ICD-10-CM

## 2022-12-29 DIAGNOSIS — R53.82 CHRONIC FATIGUE: ICD-10-CM

## 2022-12-29 DIAGNOSIS — J41.1 MUCOPURULENT CHRONIC BRONCHITIS: ICD-10-CM

## 2022-12-29 DIAGNOSIS — J16.8 FUNGAL PNEUMONIA: ICD-10-CM

## 2022-12-29 DIAGNOSIS — K21.9 GASTROESOPHAGEAL REFLUX DISEASE WITHOUT ESOPHAGITIS: ICD-10-CM

## 2022-12-29 DIAGNOSIS — B49 FUNGAL PNEUMONIA: ICD-10-CM

## 2022-12-29 DIAGNOSIS — A31.0 PULMONARY MYCOBACTERIUM AVIUM COMPLEX (MAC) INFECTION: ICD-10-CM

## 2022-12-29 LAB
ALBUMIN SERPL-MCNC: 3.9 G/DL (ref 3.5–5.2)
ALBUMIN/GLOB SERPL: 1.4 G/DL
ALP SERPL-CCNC: 64 U/L (ref 39–117)
ALT SERPL W P-5'-P-CCNC: 19 U/L (ref 1–41)
ANION GAP SERPL CALCULATED.3IONS-SCNC: 5.5 MMOL/L (ref 5–15)
AST SERPL-CCNC: 28 U/L (ref 1–40)
BASOPHILS # BLD AUTO: 0.05 10*3/MM3 (ref 0–0.2)
BASOPHILS NFR BLD AUTO: 0.8 % (ref 0–1.5)
BILIRUB SERPL-MCNC: 0.3 MG/DL (ref 0–1.2)
BUN SERPL-MCNC: 26 MG/DL (ref 8–23)
BUN/CREAT SERPL: 21 (ref 7–25)
CALCIUM SPEC-SCNC: 9.2 MG/DL (ref 8.2–9.6)
CHLORIDE SERPL-SCNC: 103 MMOL/L (ref 98–107)
CO2 SERPL-SCNC: 30.5 MMOL/L (ref 22–29)
CREAT SERPL-MCNC: 1.24 MG/DL (ref 0.76–1.27)
DEPRECATED RDW RBC AUTO: 45.3 FL (ref 37–54)
EGFRCR SERPLBLD CKD-EPI 2021: 55.2 ML/MIN/1.73
EOSINOPHIL # BLD AUTO: 0.34 10*3/MM3 (ref 0–0.4)
EOSINOPHIL NFR BLD AUTO: 5.5 % (ref 0.3–6.2)
ERYTHROCYTE [DISTWIDTH] IN BLOOD BY AUTOMATED COUNT: 14.1 % (ref 12.3–15.4)
GLOBULIN UR ELPH-MCNC: 2.7 GM/DL
GLUCOSE SERPL-MCNC: 97 MG/DL (ref 65–99)
HCT VFR BLD AUTO: 43.2 % (ref 37.5–51)
HGB BLD-MCNC: 14 G/DL (ref 13–17.7)
IMM GRANULOCYTES # BLD AUTO: 0.01 10*3/MM3 (ref 0–0.05)
IMM GRANULOCYTES NFR BLD AUTO: 0.2 % (ref 0–0.5)
LYMPHOCYTES # BLD AUTO: 2.05 10*3/MM3 (ref 0.7–3.1)
LYMPHOCYTES NFR BLD AUTO: 33.4 % (ref 19.6–45.3)
MCH RBC QN AUTO: 28.9 PG (ref 26.6–33)
MCHC RBC AUTO-ENTMCNC: 32.4 G/DL (ref 31.5–35.7)
MCV RBC AUTO: 89.1 FL (ref 79–97)
MONOCYTES # BLD AUTO: 0.54 10*3/MM3 (ref 0.1–0.9)
MONOCYTES NFR BLD AUTO: 8.8 % (ref 5–12)
NEUTROPHILS NFR BLD AUTO: 3.14 10*3/MM3 (ref 1.7–7)
NEUTROPHILS NFR BLD AUTO: 51.3 % (ref 42.7–76)
NRBC BLD AUTO-RTO: 0 /100 WBC (ref 0–0.2)
PLATELET # BLD AUTO: 182 10*3/MM3 (ref 140–450)
PMV BLD AUTO: 11.5 FL (ref 6–12)
POTASSIUM SERPL-SCNC: 4.5 MMOL/L (ref 3.5–5.2)
PROT SERPL-MCNC: 6.6 G/DL (ref 6–8.5)
RBC # BLD AUTO: 4.85 10*6/MM3 (ref 4.14–5.8)
SODIUM SERPL-SCNC: 139 MMOL/L (ref 136–145)
WBC NRBC COR # BLD: 6.13 10*3/MM3 (ref 3.4–10.8)

## 2022-12-29 PROCEDURE — 80053 COMPREHEN METABOLIC PANEL: CPT

## 2022-12-29 PROCEDURE — 99214 OFFICE O/P EST MOD 30 MIN: CPT | Performed by: INTERNAL MEDICINE

## 2022-12-29 PROCEDURE — 36415 COLL VENOUS BLD VENIPUNCTURE: CPT

## 2022-12-29 PROCEDURE — 71250 CT THORAX DX C-: CPT

## 2022-12-29 PROCEDURE — 85025 COMPLETE CBC W/AUTO DIFF WBC: CPT

## 2022-12-29 RX ORDER — FLUTICASONE PROPIONATE AND SALMETEROL XINAFOATE 230; 21 UG/1; UG/1
2 AEROSOL, METERED RESPIRATORY (INHALATION)
Qty: 1 EACH | Refills: 11 | Status: SHIPPED | OUTPATIENT
Start: 2022-12-29 | End: 2023-03-30 | Stop reason: SDUPTHER

## 2022-12-29 RX ORDER — AZITHROMYCIN 500 MG/1
500 TABLET, FILM COATED ORAL SEE ADMIN INSTRUCTIONS
Qty: 12 TABLET | Refills: 5 | Status: SHIPPED | OUTPATIENT
Start: 2022-12-29

## 2022-12-29 RX ORDER — FLUTICASONE PROPIONATE 50 MCG
2 SPRAY, SUSPENSION (ML) NASAL DAILY
Qty: 1 G | Refills: 11 | Status: SHIPPED | OUTPATIENT
Start: 2022-12-29 | End: 2023-03-30 | Stop reason: SDUPTHER

## 2022-12-29 RX ORDER — ONDANSETRON 8 MG/1
TABLET, ORALLY DISINTEGRATING ORAL
COMMUNITY
Start: 2022-11-28

## 2022-12-29 RX ORDER — MIRTAZAPINE 15 MG/1
15 TABLET, FILM COATED ORAL NIGHTLY
Qty: 30 TABLET | Refills: 6 | Status: SHIPPED | OUTPATIENT
Start: 2022-12-29

## 2022-12-29 RX ORDER — ETHAMBUTOL HYDROCHLORIDE 400 MG/1
TABLET, FILM COATED ORAL
Qty: 24 TABLET | Refills: 4 | Status: SHIPPED | OUTPATIENT
Start: 2022-12-29

## 2022-12-29 RX ORDER — DICYCLOMINE HYDROCHLORIDE 10 MG/1
20 CAPSULE ORAL
COMMUNITY
Start: 2022-11-28 | End: 2022-12-29 | Stop reason: SDUPTHER

## 2022-12-29 RX ORDER — FLUTICASONE PROPIONATE 50 MCG
2 SPRAY, SUSPENSION (ML) NASAL DAILY
COMMUNITY
Start: 2022-10-31 | End: 2022-12-29 | Stop reason: SDUPTHER

## 2022-12-29 RX ORDER — RIFAMPIN 300 MG/1
CAPSULE ORAL
Qty: 24 CAPSULE | Refills: 4 | Status: SHIPPED | OUTPATIENT
Start: 2022-12-29

## 2022-12-29 RX ORDER — FLUDROCORTISONE ACETATE 0.1 MG/1
0.1 TABLET ORAL DAILY
Qty: 30 TABLET | Refills: 0 | Status: SHIPPED | OUTPATIENT
Start: 2022-12-29 | End: 2023-03-30

## 2022-12-29 NOTE — PROGRESS NOTES
Primary Care Provider  Ananth Francisco DO     Referring Provider  No ref. provider found     Chief Complaint  COPD, Shortness of Breath, and Follow-up (Follow up/ 3-4 month /SOB upon exertion)    Subjective          Wesley Cunningham presents to [facility]  COPD  He complains of shortness of breath.   Pneumonia  He complains of shortness of breath.   Shortness of Breath      Wesley Cunningham is a 89 y.o. male patient with history of mycobacterium avium complex lung infection, COPD, history of MRSA pneumonia and lung nodules.  He is here for follow-up.  Continues to take rifampin, ethambutol and azithromycin 3 times a week.  He has been able to tolerate this medications.  He was Advair and Atrovent.  He ran out of the inhalers over the last 2 weeks and has not been using anything. He is also on Eliquis twice daily. Previous CT chest showed decrease in size of pulmonary nodules within the right upper lobe. All of the nodules were stable.  He has not had CT scan for the last year.  Previous CBC and renal function and liver functions were stable.  Serum creatinine was normal.  AST and ALT were normal.  Patient feels fatigued all the time.  He has some shortness of breath with exertion.  He still feels like he has trouble falling asleep and feels tired all the time.  But cough and wheezing has significantly has been stable.  No significant phlegm.  No nausea or vomiting.  Does feel like he has lack of energy and feels tired for most part.  He does not have good appetite as well.  No significant changes in weight or appetite.  He is currently taking no nebulizer but has nebulizer machine at home.  Still has some nasal drainage.  He is on Flonase which has not helped as much.  We had started him on Florinef and Remeron last time.  He did help him with some appetite, but he still has insomnia and has poor appetite.    Review of Systems   Respiratory: Positive for  shortness of breath.         General:  No Fatigue, No Fever No weight loss or loss of appetite  HEENT: No dysphagia, No Visual Changes, no rhinorrhea  Respiratory:  + Minimal cough,+Dyspnea, no phlegm, No Pleuritic Pain, no wheezing, no hemoptysis.  Cardiovascular: Denies chest pain, denies palpitations,+TRUJILLO, No Chest Pressure  Gastrointestinal:  No Abdominal Pain, No Nausea, No Vomiting, No Diarrhea  Genitourinary:  No Dysuria, No Frequency, No Hesitancy  Musculoskeletal: No muscle pain or swelling  Endocrine:  No Heat Intolerance, No Cold Intolerance  Hematologic:  No Bleeding, No Bruising  Psychiatric:  No Anxiety, No Depression  Neurologic:  No Confusion, no Dysarthria, No Headaches  Skin:  No Rash, +Open Wounds, infected toes left lower extremity    History reviewed. No pertinent family history.     Social History     Socioeconomic History   • Marital status:    Tobacco Use   • Smoking status: Never   • Smokeless tobacco: Never   Vaping Use   • Vaping Use: Never used   Substance and Sexual Activity   • Alcohol use: Never   • Drug use: Never   • Sexual activity: Defer        Past Medical History:   Diagnosis Date   • Anxiety    • Asthma, extrinsic    • Chronic atrial fibrillation (HCC)    • Chronic bronchitis (HCC)    • COPD (chronic obstructive pulmonary disease) (HCC)    • Depression    • Dizzy    • Emphysema of lung (HCC)    • Erectile dysfunction    • Hypertension    • Pneumonia    • Prostatitis         Immunization History   Administered Date(s) Administered   • COVID-19 (MODERNA) 1st, 2nd, 3rd Dose Only 02/09/2021, 02/09/2021, 03/11/2021, 03/11/2021, 05/09/2022   • COVID-19 (MODERNA) BIVALENT BOOSTER 12+YRS 11/30/2022   • COVID-19 (MODERNA) BOOSTER 10/28/2021   • Fluzone High Dose =>65 Years ([employer] ONLY) 10/06/2020, 10/06/2020   • Fluzone High-Dose 65+yrs 10/27/2022         Allergies   Allergen Reactions   • Amoxicillin Anaphylaxis   • Iodinated Contrast Media Anaphylaxis   • Levofloxacin  Anaphylaxis and Nausea And Vomiting   • Morphine Unknown - High Severity and Unknown (See Comments)     Other reaction(s): Unknown - High Severity   • Contrast Dye Rash and Swelling          Current Outpatient Medications:   •  ACIDOPHILUS LACTOBACILLUS PO, Take 1 capsule by mouth., Disp: , Rfl:   •  apixaban (ELIQUIS) 5 MG tablet tablet, Take 1 tablet by mouth 2 (Two) Times a Day., Disp: , Rfl:   •  azithromycin (ZITHROMAX) 500 MG tablet, Take 1 tablet by mouth See Admin Instructions. TAKE 1 TABLET BY MOUTH ON : Monday, Wednesday, Friday., Disp: 12 tablet, Rfl: 5  •  busPIRone (BUSPAR) 5 MG tablet, Take 5 mg by mouth 3 (Three) Times a Day., Disp: , Rfl:   •  Calcium Carbonate 1500 (600 Ca) MG tablet, Take  by mouth., Disp: , Rfl:   •  Cholecalciferol (Vitamin D3) 1.25 MG (93388 UT) capsule, , Disp: , Rfl:   •  Cholecalciferol 50 MCG (2000 UT) tablet, Take  by mouth., Disp: , Rfl:   •  clonazePAM (KlonoPIN) 0.5 MG tablet, , Disp: , Rfl:   •  coenzyme Q10 100 MG capsule, Take  by mouth Daily., Disp: , Rfl:   •  dicyclomine (BENTYL) 20 MG tablet, Take 20 mg by mouth., Disp: , Rfl:   •  DULoxetine (CYMBALTA) 60 MG capsule, , Disp: , Rfl:   •  Eliquis 5 MG tablet tablet, Take 5 mg by mouth 2 (two) times a day., Disp: , Rfl:   •  ethambutol (MYAMBUTOL) 400 MG tablet, Take 2 tablets on Monday, Wednesday, and Friday, Disp: 24 tablet, Rfl: 4  •  finasteride (PROSCAR) 5 MG tablet, , Disp: , Rfl:   •  fludrocortisone 0.1 MG tablet, Take 1 tablet by mouth Daily., Disp: 30 tablet, Rfl: 0  •  fluticasone (FLONASE) 50 MCG/ACT nasal spray, 2 sprays into the nostril(s) as directed by provider Daily., Disp: 1 g, Rfl: 11  •  fluticasone-salmeterol (Advair HFA) 230-21 MCG/ACT inhaler, Inhale 2 puffs 2 (Two) Times a Day., Disp: 1 each, Rfl: 11  •  furosemide (LASIX) 20 MG tablet, furosemide 20 mg oral tablet take 1 tablet (20 mg) by oral route once daily   Active, Disp: , Rfl:   •  hydrocortisone-bacitracin-zinc oxide-nystatin (MAGIC  BARRIER), Apply 1 application topically to the appropriate area as directed 3 (Three) Times a Day., Disp: 1 g, Rfl: 1  •  ipratropium (ATROVENT) 0.02 % nebulizer solution, Take 2.5 mL by nebulization 4 (Four) Times a Day As Needed for Wheezing or Shortness of Air., Disp: 12.5 mL, Rfl: 5  •  ipratropium (ATROVENT) 0.06 % nasal spray, 2 sprays into the nostril(s) as directed by provider., Disp: , Rfl:   •  ketoconazole (NIZORAL) 2 % cream, Apply  topically to the appropriate area as directed Daily., Disp: , Rfl:   •  lisinopril (PRINIVIL,ZESTRIL) 5 MG tablet, Take 2.5 mg by mouth Daily., Disp: , Rfl:   •  meclizine (ANTIVERT) 25 MG tablet, Take 25 mg by mouth., Disp: , Rfl:   •  meloxicam (MOBIC) 15 MG tablet, meloxicam 15 mg oral tablet take 1 tablet (15 mg) by oral route once daily   Active, Disp: , Rfl:   •  metoprolol succinate XL (TOPROL-XL) 25 MG 24 hr tablet, metoprolol succinate 25 mg oral tablet extended release 24 hr take 1/2 tablet by oral route twice daily.   Active, Disp: , Rfl:   •  metoprolol tartrate (LOPRESSOR) 25 MG tablet, Take 12.5 mg by mouth., Disp: , Rfl:   •  mirtazapine (REMERON) 15 MG tablet, Take 1 tablet by mouth Every Night., Disp: 30 tablet, Rfl: 6  •  multivitamin (THERAGRAN) tablet tablet, Take  by mouth., Disp: , Rfl:   •  multivitamin with minerals tablet tablet, Take 1 tablet by mouth Daily., Disp: , Rfl:   •  nystatin (MYCOSTATIN) 766361 UNIT/ML suspension, , Disp: , Rfl:   •  omeprazole (priLOSEC) 20 MG capsule, Take 20 mg by mouth Daily., Disp: , Rfl:   •  ondansetron ODT (ZOFRAN-ODT) 8 MG disintegrating tablet, , Disp: , Rfl:   •  pentoxifylline (TRENtal) 400 MG CR tablet, , Disp: , Rfl:   •  rifAMPin (RIFADIN) 300 MG capsule, Take 2 tablets by mouth on monday wednesday and friday, Disp: 24 capsule, Rfl: 4  •  tamsulosin (FLOMAX) 0.4 MG capsule 24 hr capsule, Take 0.4 mg by mouth 2 (two) times a day., Disp: , Rfl:   •  traMADol (ULTRAM) 50 MG tablet, , Disp: , Rfl:   •   "triamcinolone (KENALOG) 0.1 % ointment, Apply  topically to the appropriate area as directed., Disp: , Rfl:   •  venlafaxine XR (EFFEXOR-XR) 150 MG 24 hr capsule, Take 150 mg by mouth Daily., Disp: , Rfl:   •  vitamin B-12 (CYANOCOBALAMIN) 1000 MCG tablet, Take 2,500 mcg by mouth Daily., Disp: , Rfl:   •  ipratropium-albuterol (COMBIVENT RESPIMAT)  MCG/ACT inhaler, Inhale 1 puff 4 (Four) Times a Day As Needed for Wheezing., Disp: 4 g, Rfl: 11     Objective   Vital Signs:   /63 (BP Location: Right arm, Patient Position: Sitting, Cuff Size: Adult)   Pulse 77   Temp 97.8 °F (36.6 °C) (Tympanic)   Resp 18   Ht 174 cm (68.5\")   Wt 64.3 kg (141 lb 12.8 oz)   SpO2 97% Comment: room air  BMI 21.25 kg/m²     Mallampatti classification : 1  Physical Exam  Vital Signs Reviewed  WDWN, Alert, NAD.   HEENT:  PERRL, EOMI.  OP, nares clear, no sinus tenderness  Neck:  Supple, no JVD, no thyromegaly  Lymph: no axillary, cervical, supraclavicular lymphadenopathy noted bilaterally  Chest:  good aeration, bilateral diminished breath sounds, no wheezing, crackles or rhonchi, resonant to percussion b/l  CV: Irregular, no MGR, pulses 2+, equal.  Abd:  Soft, NT, ND, + BS, no HSM  EXT:  no clubbing, no cyanosis, No BLE edema, erythematous, indurated and with some secretions on crevices between third and fourth left toes  Neuro:  A&Ox3, CN grossly intact, no focal deficits.  Skin: No rashes or lesions noted, open left toes wound     Result Review :   The following data was reviewed by: Blu Delgado MD on 01/10/2022:  Common labs    Common Labs 8/25/22 8/25/22    1657 1657   Glucose  59 (A)   BUN  20   Creatinine  1.05   Sodium  139   Potassium  4.1   Chloride  101   Calcium  9.3   Albumin  4.00   Total Bilirubin  0.4   Alkaline Phosphatase  73   AST (SGOT)  26   ALT (SGPT)  14   WBC 7.12    Hemoglobin 14.2    Hematocrit 41.4    Platelets 206    (A) Abnormal value          CBC and CMP from 11/10/2021 done at [facility] was reviewed.  It shows a stable renal function with normal serum creatinine at 1.2.  Normal liver function with normal transaminases.  Normal CBC.  CMP    CMP 8/25/22   Glucose 59 (A)   BUN 20   Creatinine 1.05   eGFR 67.4   Sodium 139   Potassium 4.1   Chloride 101   Calcium 9.3   Total Protein 6.5   Albumin 4.00   Globulin 2.5   Total Bilirubin 0.4   Alkaline Phosphatase 73   AST (SGOT) 26   ALT (SGPT) 14   Albumin/Globulin Ratio 1.6   BUN/Creatinine Ratio 19.0   Anion Gap 9.0   (A) Abnormal value       Comments are available for some flowsheets but are not being displayed.           CBC    CBC 8/25/22   WBC 7.12   RBC 4.78   Hemoglobin 14.2   Hematocrit 41.4   MCV 86.6   MCH 29.7   MCHC 34.3   RDW 12.7   Platelets 206             Data reviewed: Radiologic studies CT scan of the chest from November 20,021 shows stable lung nodules and improved nodular density from right upper lobe.       Previous blood work reviewed.  Has mild hypoglycemia last office visit.     Assessment and Plan    Diagnoses and all orders for this visit:    1. Abnormal chest CT  -     fluticasone-salmeterol (Advair HFA) 230-21 MCG/ACT inhaler; Inhale 2 puffs 2 (Two) Times a Day.  Dispense: 1 each; Refill: 11  -     rifAMPin (RIFADIN) 300 MG capsule; Take 2 tablets by mouth on monday wednesday and friday  Dispense: 24 capsule; Refill: 4  -     mirtazapine (REMERON) 15 MG tablet; Take 1 tablet by mouth Every Night.  Dispense: 30 tablet; Refill: 6  -     fluticasone (FLONASE) 50 MCG/ACT nasal spray; 2 sprays into the nostril(s) as directed by provider Daily.  Dispense: 1 g; Refill: 11  -     fludrocortisone 0.1 MG tablet; Take 1 tablet by mouth Daily.  Dispense: 30 tablet; Refill: 0  -     ethambutol (MYAMBUTOL) 400 MG tablet; Take 2 tablets on Monday, Wednesday, and Friday  Dispense: 24 tablet; Refill: 4  -     azithromycin (ZITHROMAX) 500 MG tablet; Take 1 tablet by mouth See Admin Instructions. TAKE 1 TABLET BY MOUTH ON : Monday,  Wednesday, Friday.  Dispense: 12 tablet; Refill: 5  -     CT Chest Without Contrast; Future  -     CBC & Differential; Future  -     Comprehensive Metabolic Panel; Future  -     ipratropium-albuterol (COMBIVENT RESPIMAT)  MCG/ACT inhaler; Inhale 1 puff 4 (Four) Times a Day As Needed for Wheezing.  Dispense: 4 g; Refill: 11    2. Mucopurulent chronic bronchitis (HCC)  -     fluticasone-salmeterol (Advair HFA) 230-21 MCG/ACT inhaler; Inhale 2 puffs 2 (Two) Times a Day.  Dispense: 1 each; Refill: 11  -     rifAMPin (RIFADIN) 300 MG capsule; Take 2 tablets by mouth on monday wednesday and friday  Dispense: 24 capsule; Refill: 4  -     mirtazapine (REMERON) 15 MG tablet; Take 1 tablet by mouth Every Night.  Dispense: 30 tablet; Refill: 6  -     fluticasone (FLONASE) 50 MCG/ACT nasal spray; 2 sprays into the nostril(s) as directed by provider Daily.  Dispense: 1 g; Refill: 11  -     fludrocortisone 0.1 MG tablet; Take 1 tablet by mouth Daily.  Dispense: 30 tablet; Refill: 0  -     ethambutol (MYAMBUTOL) 400 MG tablet; Take 2 tablets on Monday, Wednesday, and Friday  Dispense: 24 tablet; Refill: 4  -     azithromycin (ZITHROMAX) 500 MG tablet; Take 1 tablet by mouth See Admin Instructions. TAKE 1 TABLET BY MOUTH ON : Monday, Wednesday, Friday.  Dispense: 12 tablet; Refill: 5  -     CT Chest Without Contrast; Future  -     CBC & Differential; Future  -     Comprehensive Metabolic Panel; Future  -     ipratropium-albuterol (COMBIVENT RESPIMAT)  MCG/ACT inhaler; Inhale 1 puff 4 (Four) Times a Day As Needed for Wheezing.  Dispense: 4 g; Refill: 11    3. Lung nodule  -     fluticasone-salmeterol (Advair HFA) 230-21 MCG/ACT inhaler; Inhale 2 puffs 2 (Two) Times a Day.  Dispense: 1 each; Refill: 11  -     rifAMPin (RIFADIN) 300 MG capsule; Take 2 tablets by mouth on monday wednesday and friday  Dispense: 24 capsule; Refill: 4  -     mirtazapine (REMERON) 15 MG tablet; Take 1 tablet by mouth Every Night.  Dispense: 30  tablet; Refill: 6  -     fluticasone (FLONASE) 50 MCG/ACT nasal spray; 2 sprays into the nostril(s) as directed by provider Daily.  Dispense: 1 g; Refill: 11  -     fludrocortisone 0.1 MG tablet; Take 1 tablet by mouth Daily.  Dispense: 30 tablet; Refill: 0  -     ethambutol (MYAMBUTOL) 400 MG tablet; Take 2 tablets on Monday, Wednesday, and Friday  Dispense: 24 tablet; Refill: 4  -     azithromycin (ZITHROMAX) 500 MG tablet; Take 1 tablet by mouth See Admin Instructions. TAKE 1 TABLET BY MOUTH ON : Monday, Wednesday, Friday.  Dispense: 12 tablet; Refill: 5  -     CT Chest Without Contrast; Future  -     CBC & Differential; Future  -     Comprehensive Metabolic Panel; Future  -     ipratropium-albuterol (COMBIVENT RESPIMAT)  MCG/ACT inhaler; Inhale 1 puff 4 (Four) Times a Day As Needed for Wheezing.  Dispense: 4 g; Refill: 11    4. Chronic fatigue  -     fluticasone-salmeterol (Advair HFA) 230-21 MCG/ACT inhaler; Inhale 2 puffs 2 (Two) Times a Day.  Dispense: 1 each; Refill: 11  -     rifAMPin (RIFADIN) 300 MG capsule; Take 2 tablets by mouth on monday wednesday and friday  Dispense: 24 capsule; Refill: 4  -     mirtazapine (REMERON) 15 MG tablet; Take 1 tablet by mouth Every Night.  Dispense: 30 tablet; Refill: 6  -     fluticasone (FLONASE) 50 MCG/ACT nasal spray; 2 sprays into the nostril(s) as directed by provider Daily.  Dispense: 1 g; Refill: 11  -     fludrocortisone 0.1 MG tablet; Take 1 tablet by mouth Daily.  Dispense: 30 tablet; Refill: 0  -     ethambutol (MYAMBUTOL) 400 MG tablet; Take 2 tablets on Monday, Wednesday, and Friday  Dispense: 24 tablet; Refill: 4  -     azithromycin (ZITHROMAX) 500 MG tablet; Take 1 tablet by mouth See Admin Instructions. TAKE 1 TABLET BY MOUTH ON : Monday, Wednesday, Friday.  Dispense: 12 tablet; Refill: 5  -     CT Chest Without Contrast; Future  -     CBC & Differential; Future  -     Comprehensive Metabolic Panel; Future  -     ipratropium-albuterol (COMBIVENT  RESPIMAT)  MCG/ACT inhaler; Inhale 1 puff 4 (Four) Times a Day As Needed for Wheezing.  Dispense: 4 g; Refill: 11    5. Dizzy  -     fluticasone-salmeterol (Advair HFA) 230-21 MCG/ACT inhaler; Inhale 2 puffs 2 (Two) Times a Day.  Dispense: 1 each; Refill: 11  -     rifAMPin (RIFADIN) 300 MG capsule; Take 2 tablets by mouth on monday wednesday and friday  Dispense: 24 capsule; Refill: 4  -     mirtazapine (REMERON) 15 MG tablet; Take 1 tablet by mouth Every Night.  Dispense: 30 tablet; Refill: 6  -     fluticasone (FLONASE) 50 MCG/ACT nasal spray; 2 sprays into the nostril(s) as directed by provider Daily.  Dispense: 1 g; Refill: 11  -     fludrocortisone 0.1 MG tablet; Take 1 tablet by mouth Daily.  Dispense: 30 tablet; Refill: 0  -     ethambutol (MYAMBUTOL) 400 MG tablet; Take 2 tablets on Monday, Wednesday, and Friday  Dispense: 24 tablet; Refill: 4  -     azithromycin (ZITHROMAX) 500 MG tablet; Take 1 tablet by mouth See Admin Instructions. TAKE 1 TABLET BY MOUTH ON : Monday, Wednesday, Friday.  Dispense: 12 tablet; Refill: 5  -     CT Chest Without Contrast; Future  -     CBC & Differential; Future  -     Comprehensive Metabolic Panel; Future  -     ipratropium-albuterol (COMBIVENT RESPIMAT)  MCG/ACT inhaler; Inhale 1 puff 4 (Four) Times a Day As Needed for Wheezing.  Dispense: 4 g; Refill: 11    6. 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  Dispense: 1 each; Refill: 11  -     rifAMPin (RIFADIN) 300 MG capsule; Take 2 tablets by mouth on monday wednesday and friday  Dispense: 24 capsule; Refill: 4  -     mirtazapine (REMERON) 15 MG tablet; Take 1 tablet by mouth Every Night.  Dispense: 30 tablet; Refill: 6  -     fluticasone (FLONASE) 50 MCG/ACT nasal spray; 2 sprays into the nostril(s) as directed by provider Daily.  Dispense: 1 g; Refill: 11  -     fludrocortisone 0.1 MG tablet;  Take 1 tablet by mouth Daily.  Dispense: 30 tablet; Refill: 0  -     ethambutol (MYAMBUTOL) 400 MG tablet; Take 2 tablets on Monday, Wednesday, and Friday  Dispense: 24 tablet; Refill: 4  -     azithromycin (ZITHROMAX) 500 MG tablet; Take 1 tablet by mouth See Admin Instructions. TAKE 1 TABLET BY MOUTH ON : Monday, Wednesday, Friday.  Dispense: 12 tablet; Refill: 5  -     CT Chest Without Contrast; Future  -     CBC & Differential; Future  -     Comprehensive Metabolic Panel; Future  -     ipratropium-albuterol (COMBIVENT RESPIMAT)  MCG/ACT inhaler; Inhale 1 puff 4 (Four) Times a Day As Needed for Wheezing.  Dispense: 4 g; Refill: 11    7. Fungal pneumonia  -     fluticasone-salmeterol (Advair HFA) 230-21 MCG/ACT inhaler; Inhale 2 puffs 2 (Two) Times a Day.  Dispense: 1 each; Refill: 11  -     fludrocortisone 0.1 MG tablet; Take 1 tablet by mouth Daily.  Dispense: 30 tablet; Refill: 0    8. Gastroesophageal reflux disease without esophagitis  -     fluticasone-salmeterol (Advair HFA) 230-21 MCG/ACT inhaler; Inhale 2 puffs 2 (Two) Times a Day.  Dispense: 1 each; Refill: 11  -     rifAMPin (RIFADIN) 300 MG capsule; Take 2 tablets by mouth on monday wednesday and friday  Dispense: 24 capsule; Refill: 4  -     mirtazapine (REMERON) 15 MG tablet; Take 1 tablet by mouth Every Night.  Dispense: 30 tablet; Refill: 6  -     fluticasone (FLONASE) 50 MCG/ACT nasal spray; 2 sprays into the nostril(s) as directed by provider Daily.  Dispense: 1 g; Refill: 11  -     fludrocortisone 0.1 MG tablet; Take 1 tablet by mouth Daily.  Dispense: 30 tablet; Refill: 0  -     ethambutol (MYAMBUTOL) 400 MG tablet; Take 2 tablets on Monday, Wednesday, and Friday  Dispense: 24 tablet; Refill: 4  -     azithromycin (ZITHROMAX) 500 MG tablet; Take 1 tablet by mouth See Admin Instructions. TAKE 1 TABLET BY MOUTH ON : Monday, Wednesday, Friday.  Dispense: 12 tablet; Refill: 5  -     CT Chest Without Contrast; Future  -     CBC & Differential;  Future  -     Comprehensive Metabolic Panel; Future  -     ipratropium-albuterol (COMBIVENT RESPIMAT)  MCG/ACT inhaler; Inhale 1 puff 4 (Four) Times a Day As Needed for Wheezing.  Dispense: 4 g; Refill: 11    9. Mycobacterium avium infection (HCC)  -     fluticasone-salmeterol (Advair HFA) 230-21 MCG/ACT inhaler; Inhale 2 puffs 2 (Two) Times a Day.  Dispense: 1 each; Refill: 11  -     rifAMPin (RIFADIN) 300 MG capsule; Take 2 tablets by mouth on monday wednesday and friday  Dispense: 24 capsule; Refill: 4  -     mirtazapine (REMERON) 15 MG tablet; Take 1 tablet by mouth Every Night.  Dispense: 30 tablet; Refill: 6  -     fluticasone (FLONASE) 50 MCG/ACT nasal spray; 2 sprays into the nostril(s) as directed by provider Daily.  Dispense: 1 g; Refill: 11  -     fludrocortisone 0.1 MG tablet; Take 1 tablet by mouth Daily.  Dispense: 30 tablet; Refill: 0  -     ethambutol (MYAMBUTOL) 400 MG tablet; Take 2 tablets on Monday, Wednesday, and Friday  Dispense: 24 tablet; Refill: 4  -     azithromycin (ZITHROMAX) 500 MG tablet; Take 1 tablet by mouth See Admin Instructions. TAKE 1 TABLET BY MOUTH ON : Monday, Wednesday, Friday.  Dispense: 12 tablet; Refill: 5  -     CT Chest Without Contrast; Future  -     CBC & Differential; Future  -     Comprehensive Metabolic Panel; Future  -     ipratropium-albuterol (COMBIVENT RESPIMAT)  MCG/ACT inhaler; Inhale 1 puff 4 (Four) Times a Day As Needed for Wheezing.  Dispense: 4 g; Refill: 11      Continue with rifampin 600 mg 3 times weekly, ethambutol 800 mg 3 times weekly, azithromycin 500 mg 3 times weekly.  Liver and renal function are stable CBC count is normal.  CT scan shows improved lung nodules.  We will order labs including CBC, CMP and repeat CT scan of the chest now.  If improving CT scan of the chest, we will consider bronchoscopy for AFB culture.  Patient has not been able to cough anything up.    Restart back on Advair and start Incruse inhaler.  Use albuterol as  needed.    Continue oxygen to keep saturations more than 90% with sleep and activities.  Continue Eliquis 5 mg twice daily.    Continue Flonase nasal spray.  Continue azelastine nasal spray.  Continue Florinef 0.1 milligrams once daily.  Start Remeron at 50 mg once nightly.    He is up-to-date on flu, pneumonia and COVID-vaccine including booster dose.  He is able to tolerate triple antibiotics for his MAC infection.    Follow Up   In 1-2 months.  Patient was given instructions and counseling regarding his condition or for health maintenance advice. Please see specific information pulled into the AVS if appropriate.       Electronically signed by Blu Delgado MD, 12/29/2022, 13:04 EST.

## 2023-01-03 DIAGNOSIS — A31.0 PULMONARY MYCOBACTERIUM AVIUM COMPLEX (MAC) INFECTION: Primary | ICD-10-CM

## 2023-03-30 ENCOUNTER — OFFICE VISIT (OUTPATIENT)
Dept: PULMONOLOGY | Facility: CLINIC | Age: 88
End: 2023-03-30
Payer: MEDICARE

## 2023-03-30 VITALS
RESPIRATION RATE: 16 BRPM | HEIGHT: 69 IN | BODY MASS INDEX: 21.55 KG/M2 | OXYGEN SATURATION: 90 % | WEIGHT: 145.5 LBS | HEART RATE: 82 BPM | SYSTOLIC BLOOD PRESSURE: 91 MMHG | DIASTOLIC BLOOD PRESSURE: 63 MMHG

## 2023-03-30 DIAGNOSIS — K21.9 GASTROESOPHAGEAL REFLUX DISEASE WITHOUT ESOPHAGITIS: Primary | ICD-10-CM

## 2023-03-30 DIAGNOSIS — R42 DIZZY: ICD-10-CM

## 2023-03-30 DIAGNOSIS — B49 FUNGAL PNEUMONIA: ICD-10-CM

## 2023-03-30 DIAGNOSIS — J41.1 MUCOPURULENT CHRONIC BRONCHITIS: ICD-10-CM

## 2023-03-30 DIAGNOSIS — J16.8 FUNGAL PNEUMONIA: ICD-10-CM

## 2023-03-30 DIAGNOSIS — A31.0 PULMONARY MYCOBACTERIUM AVIUM COMPLEX (MAC) INFECTION: ICD-10-CM

## 2023-03-30 DIAGNOSIS — R53.82 CHRONIC FATIGUE: ICD-10-CM

## 2023-03-30 DIAGNOSIS — A31.0 MYCOBACTERIUM AVIUM INFECTION: ICD-10-CM

## 2023-03-30 DIAGNOSIS — R93.89 ABNORMAL CHEST CT: ICD-10-CM

## 2023-03-30 DIAGNOSIS — R91.1 LUNG NODULE: ICD-10-CM

## 2023-03-30 PROCEDURE — 99214 OFFICE O/P EST MOD 30 MIN: CPT | Performed by: INTERNAL MEDICINE

## 2023-03-30 RX ORDER — FLUTICASONE PROPIONATE AND SALMETEROL XINAFOATE 230; 21 UG/1; UG/1
2 AEROSOL, METERED RESPIRATORY (INHALATION)
Qty: 1 EACH | Refills: 11 | Status: SHIPPED | OUTPATIENT
Start: 2023-03-30

## 2023-03-30 RX ORDER — FLUTICASONE PROPIONATE 50 MCG
2 SPRAY, SUSPENSION (ML) NASAL DAILY
Qty: 1 G | Refills: 11 | Status: SHIPPED | OUTPATIENT
Start: 2023-03-30

## 2023-03-30 RX ORDER — SPIRONOLACTONE 25 MG/1
25 TABLET ORAL DAILY
COMMUNITY

## 2023-03-30 RX ORDER — CLOPIDOGREL BISULFATE 75 MG/1
75 TABLET ORAL DAILY
COMMUNITY

## 2023-03-30 NOTE — PROGRESS NOTES
Primary Care Provider  Provider, No Known     Referring Provider  No ref. provider found     Chief Complaint  Abnormal Chest CT , COPD, Lung Nodule, Follow-up (3 Month ), and Shortness of Breath (With exertion)    Subjective          Wesley Cunningham presents to [facility]ULMONARY [facility]ARE [facility]ICINE  COPD  He complains of shortness of breath.   Shortness of Breath    Pneumonia  He complains of shortness of breath.     Wesley Cunningham is a 89 y.o. male patient with history of mycobacterium avium complex lung infection, COPD, history of MRSA pneumonia and lung nodules.  He is here for follow-up.  Since his last office visit, he had CT scan of the chest done as well as blood work done for his chronic triple antibiotic therapy.  Liver function, renal function and CBC were normal.  CT scan of the chest showed some persistent lung nodules, and new groundglass opacities.  He continues to take rifampin, ethambutol and azithromycin 3 times a week.  He has been able to tolerate this medications.  He was started on Incruse on top of Advair on last office visit.  Continued with Eliquis 5 mg twice daily.  Since his last office visit, he was admitted to outside hospital with heart attack, was treated medically.  Cardiology service told him that with his age and overall condition, he would have high risk of periprocedural complications.  It was decided to be managed conservatively.  He is currently on medications adjusted by Dr. Wells in [place].  I discussed with him regarding need of bronchoscopy if he is not able to cough up any phlegm.  He is willing to try it with nebulizer to get the sputum sample for AFB.  He continues to take the medications and does not have significant side effects with it.  He has intermittent cough, does not produce much phlegm.  He has shortness of breath with activities.  He feels weak and tired at times.  I reviewed the last CT scan of the chest and blood work with  him today.        Review of Systems   Respiratory: Positive for shortness of breath.     General:  Fatigue, No Fever No weight loss or loss of appetite  HEENT: No dysphagia, No Visual Changes, no rhinorrhea  Respiratory:  + cough,+Dyspnea, no phlegm, No Pleuritic Pain, no wheezing, no hemoptysis  Cardiovascular: Denies chest pain, denies palpitations,+TRUJILLO, No Chest Pressure  Gastrointestinal:  No Abdominal Pain, No Nausea, No Vomiting, No Diarrhea  Genitourinary:  No Dysuria, No Frequency, No Hesitancy  Musculoskeletal: No muscle pain or swelling  Endocrine:  No Heat Intolerance, No Cold Intolerance  Hematologic:  No Bleeding, No Bruising  Psychiatric:  No Anxiety, No Depression  Neurologic:  No Confusion, No Headaches  Skin:  No Rash, +Open Wounds, infected toes left lower extremity    History reviewed. No pertinent family history.     Social History     Socioeconomic History   • Marital status:    Tobacco Use   • Smoking status: Never     Passive exposure: Past   • Smokeless tobacco: Never   Vaping Use   • Vaping Use: Never used   Substance and Sexual Activity   • Alcohol use: Never   • Drug use: Never   • Sexual activity: Defer        Past Medical History:   Diagnosis Date   • Anxiety    • Asthma, extrinsic    • Chronic atrial fibrillation (HCC)    • Chronic bronchitis (HCC)    • COPD (chronic obstructive pulmonary disease) (HCC)    • Depression    • Dizzy    • Emphysema of lung (HCC)    • Erectile dysfunction    • Hypertension    • Pneumonia    • Prostatitis         Immunization History   Administered Date(s) Administered   • COVID-19 (MODERNA) 1st, 2nd, 3rd Dose Only 02/09/2021, 02/09/2021, 03/11/2021, 03/11/2021, 05/09/2022   • COVID-19 (MODERNA) BIVALENT BOOSTER 12+YRS 11/30/2022   • COVID-19 (MODERNA) BOOSTER 10/28/2021   • Fluzone High Dose =>65 Years ([employer] ONLY) 10/06/2020, 10/06/2020   • Fluzone High-Dose 65+yrs 10/27/2022         Allergies   Allergen Reactions   • Amoxicillin Anaphylaxis   •  Iodinated Contrast Media Anaphylaxis   • Levofloxacin Anaphylaxis and Nausea And Vomiting   • Morphine Unknown - High Severity and Unknown (See Comments)     Other reaction(s): Unknown - High Severity   • Contrast Dye (Echo Or Unknown Ct/Mr) Rash and Swelling          Current Outpatient Medications:   •  ACIDOPHILUS LACTOBACILLUS PO, Take 1 capsule by mouth., Disp: , Rfl:   •  azithromycin (ZITHROMAX) 500 MG tablet, Take 1 tablet by mouth See Admin Instructions. TAKE 1 TABLET BY MOUTH ON : Monday, Wednesday, Friday., Disp: 12 tablet, Rfl: 5  •  busPIRone (BUSPAR) 5 MG tablet, Take 1 tablet by mouth 3 (Three) Times a Day., Disp: , Rfl:   •  Calcium Carbonate 1500 (600 Ca) MG tablet, Take  by mouth., Disp: , Rfl:   •  Cholecalciferol (Vitamin D3) 1.25 MG (17805 UT) capsule, , Disp: , Rfl:   •  Cholecalciferol 50 MCG (2000 UT) tablet, Take  by mouth., Disp: , Rfl:   •  clopidogrel (PLAVIX) 75 MG tablet, Take 1 tablet by mouth Daily., Disp: , Rfl:   •  coenzyme Q10 100 MG capsule, Take  by mouth Daily., Disp: , Rfl:   •  DULoxetine (CYMBALTA) 60 MG capsule, , Disp: , Rfl:   •  Eliquis 5 MG tablet tablet, Take 1 tablet by mouth 2 (two) times a day., Disp: , Rfl:   •  ethambutol (MYAMBUTOL) 400 MG tablet, Take 2 tablets on Monday, Wednesday, and Friday, Disp: 24 tablet, Rfl: 4  •  finasteride (PROSCAR) 5 MG tablet, , Disp: , Rfl:   •  fluticasone (FLONASE) 50 MCG/ACT nasal spray, 2 sprays into the nostril(s) as directed by provider Daily., Disp: 1 g, Rfl: 11  •  fluticasone-salmeterol (Advair HFA) 230-21 MCG/ACT inhaler, Inhale 2 puffs 2 (Two) Times a Day., Disp: 1 each, Rfl: 11  •  furosemide (LASIX) 20 MG tablet, furosemide 20 mg oral tablet take 1 tablet (20 mg) by oral route once daily   Active, Disp: , Rfl:   •  hydrocortisone-bacitracin-zinc oxide-nystatin (MAGIC BARRIER), Apply 1 application topically to the appropriate area as directed 3 (Three) Times a Day., Disp: 1 g, Rfl: 1  •  ipratropium-albuterol (COMBIVENT  RESPIMAT)  MCG/ACT inhaler, Inhale 1 puff 4 (Four) Times a Day As Needed for Wheezing., Disp: 4 g, Rfl: 11  •  ketoconazole (NIZORAL) 2 % cream, Apply  topically to the appropriate area as directed Daily., Disp: , Rfl:   •  lisinopril (PRINIVIL,ZESTRIL) 5 MG tablet, Take 2.5 mg by mouth Daily., Disp: , Rfl:   •  meclizine (ANTIVERT) 25 MG tablet, Take 1 tablet by mouth., Disp: , Rfl:   •  meloxicam (MOBIC) 15 MG tablet, meloxicam 15 mg oral tablet take 1 tablet (15 mg) by oral route once daily   Active, Disp: , Rfl:   •  metoprolol succinate XL (TOPROL-XL) 25 MG 24 hr tablet, metoprolol succinate 25 mg oral tablet extended release 24 hr take 1/2 tablet by oral route twice daily.   Active, Disp: , Rfl:   •  mirtazapine (REMERON) 15 MG tablet, Take 1 tablet by mouth Every Night., Disp: 30 tablet, Rfl: 6  •  multivitamin with minerals tablet tablet, Take 1 tablet by mouth Daily., Disp: , Rfl:   •  nystatin (MYCOSTATIN) 219990 UNIT/ML suspension, , Disp: , Rfl:   •  omeprazole (priLOSEC) 20 MG capsule, Take 1 capsule by mouth Daily., Disp: , Rfl:   •  ondansetron ODT (ZOFRAN-ODT) 8 MG disintegrating tablet, , Disp: , Rfl:   •  pentoxifylline (TRENtal) 400 MG CR tablet, , Disp: , Rfl:   •  rifAMPin (RIFADIN) 300 MG capsule, Take 2 tablets by mouth on monday wednesday and friday, Disp: 24 capsule, Rfl: 4  •  spironolactone (ALDACTONE) 25 MG tablet, Take 1 tablet by mouth Daily., Disp: , Rfl:   •  tamsulosin (FLOMAX) 0.4 MG capsule 24 hr capsule, Take 1 capsule by mouth 2 (two) times a day., Disp: , Rfl:   •  triamcinolone (KENALOG) 0.1 % ointment, Apply  topically to the appropriate area as directed., Disp: , Rfl:   •  venlafaxine XR (EFFEXOR-XR) 150 MG 24 hr capsule, Take 1 capsule by mouth Daily., Disp: , Rfl:   •  vitamin B-12 (CYANOCOBALAMIN) 1000 MCG tablet, Take 2.5 tablets by mouth Daily., Disp: , Rfl:   •  clonazePAM (KlonoPIN) 0.5 MG tablet, , Disp: , Rfl:   •  dicyclomine (BENTYL) 20 MG tablet, Take 20 mg  "by mouth. (Patient not taking: Reported on 3/30/2023), Disp: , Rfl:   •  ipratropium (ATROVENT) 0.02 % nebulizer solution, Take 2.5 mL by nebulization 4 (Four) Times a Day As Needed for Wheezing or Shortness of Air. (Patient not taking: Reported on 3/30/2023), Disp: 12.5 mL, Rfl: 5  •  ipratropium (ATROVENT) 0.06 % nasal spray, 2 sprays into the nostril(s) as directed by provider. (Patient not taking: Reported on 3/30/2023), Disp: , Rfl:   •  metoprolol tartrate (LOPRESSOR) 25 MG tablet, Take 12.5 mg by mouth., Disp: , Rfl:   •  traMADol (ULTRAM) 50 MG tablet, , Disp: , Rfl:      Objective   Vital Signs:   BP 91/63 (BP Location: Left arm, Patient Position: Sitting, Cuff Size: Adult)   Pulse 82   Resp 16   Ht 175.3 cm (69\")   Wt 66 kg (145 lb 8 oz)   SpO2 90%   BMI 21.49 kg/m²     Mallampatti classification : 1  Physical Exam  Vital Signs Reviewed  Pleasant elderly male, in mild distress, has some conversational dyspnea   HEENT:  PERRL, EOMI.  OP, nares clear, no sinus tenderness  Neck:  Supple, no JVD, no thyromegaly  Lymph: no axillary, cervical, supraclavicular lymphadenopathy noted bilaterally  Chest:  good aeration, bilateral diminished breath sounds, no wheezing, crackles or rhonchi, resonant to percussion b/l  CV: Irregular, no MGR, pulses 2+, equal  Abd:  Soft, NT, ND, + BS, no HSM  EXT:  no clubbing, no cyanosis, No BLE edema, erythematous, indurated and with some secretions on crevices between third and fourth left toes  Neuro:  A&Ox3, CN grossly intact, no focal deficits  Skin: No rashes or lesions noted, open left toes wound     Result Review :   The following data was reviewed by: Blu Delgado MD on 01/10/2022:  Common labs    Common Labs 12/29/22 12/29/22 1/17/23 1/17/23 1/30/23 1/30/23 1/30/23 1/30/23    1217 1217 0021 0021 1305 1305 1305 1305   Glucose  97         Glucose       48 (A)    BUN  26 (A)     28 (A)    Creatinine  1.24     1.1    Sodium  139     143    Potassium  4.5     4.4  "   Chloride  103     106    Calcium  9.2     8.7    Albumin  3.9 3.7    3.4 (A)    Total Bilirubin  0.3 1.1    0.47    Alkaline Phosphatase  64 69    68    AST (SGOT)  28 131 (A)    23    ALT (SGPT)  19 52    17    WBC 6.13          Hemoglobin 14.0          Hematocrit 43.2          Platelets 182          Total Cholesterol        85   Triglycerides        82   HDL Cholesterol        36   LDL Cholesterol         33   Hemoglobin A1C    4.9  5.1     PSA     6.62 (A)      (A) Abnormal value       Comments are available for some flowsheets but are not being displayed.         CBC and CMP from 11/10/2021 done at [facility] was reviewed.  It shows a stable renal function with normal serum creatinine at 1.2.  Normal liver function with normal transaminases.  Normal CBC.  CMP    CMP 12/29/22 1/17/23 1/30/23   Glucose 97     Glucose   48 (A)   BUN 26 (A)  28 (A)   Creatinine 1.24  1.1   eGFR 55.2 (A)     Sodium 139  143   Potassium 4.5  4.4   Chloride 103  106   Calcium 9.2  8.7   Total Protein 6.6 6.6    Albumin 3.9 3.7 3.4 (A)   Globulin 2.7 2.9    Total Bilirubin 0.3 1.1 0.47   Alkaline Phosphatase 64 69 68   AST (SGOT) 28 131 (A) 23   ALT (SGPT) 19 52 17   Albumin/Globulin Ratio 1.4     BUN/Creatinine Ratio 21.0     Anion Gap 5.5  7   (A) Abnormal value       Comments are available for some flowsheets but are not being displayed.           CBC    CBC 8/25/22 12/29/22   WBC 7.12 6.13   RBC 4.78 4.85   Hemoglobin 14.2 14.0   Hematocrit 41.4 43.2   MCV 86.6 89.1   MCH 29.7 28.9   MCHC 34.3 32.4   RDW 12.7 14.1   Platelets 206 182             Data reviewed: Radiologic studies CT scan of the chest from 12/29/2022 was reviewed.  Shows continued lung nodules and some groundglass opacities.       Narrative & Impression   PROCEDURE:  CT CHEST WO CONTRAST DIAGNOSTIC     COMPARISON: [facility], CT, CHEST W/O CONTRAST, 6/16/2020, 12:45.    [facility], CT, CT CHEST WO CONTRAST  DIAGNOSTIC, 11/08/2021, 11:22.     INDICATIONS:  F/U PREVIOUS LUNG NODULES, COUGH, NON SMOKER     TECHNIQUE:    CT images were created without the administration of contrast material.       PROTOCOL:     Standard imaging protocol performed                 RADIATION:      DLP: 280.6mGy*cm               Automated exposure control was utilized to minimize radiation dose.      FINDINGS:          There is some bibasilar atelectasis and what looks like some bronchiectasis in the basilar areas.    There is a noncalcified pulmonary nodule in the right upper lobe on image 23 measuring 0.61 cm   similar to the prior study.  There is additional noncalcified pulmonary nodule measuring 0.55 cm   unchanged on image 18. There is some scarring in the right upper lobe.  Best defined on axial image   number 40 there are some ground-glass changes which have developed which may relate to a more acute   inflammatory infectious process.  There are some clustered small pulmonary nodules in the posterior   segment left upper lobe best seen on images 22 to 29 which have been suggested and are similar in   appearance to the prior exam.  There is some atelectasis or scarring in the lingular area.     There is vascular calcification including moderate coronary artery calcification.     There is a small hiatal hernia.     Lower slices through the upper abdomen reveal left renal cysts.     There are multilevel degenerative changes involving the thoracic spine.  There is a curvature to   the thoracolumbar spine.       IMPRESSION:                 1. There are new ground-glass changes in the right upper lobe that could relate to a more acute   inflammatory/infectious process.  2. Unchanged pulmonary nodules within the upper lobes  3. Bibasilar atelectasis/scarring with some underlying bronchiectasis.  There also is some   atelectasis or scarring in the lingular area.  4. Atherosclerotic change  5. Hiatal hernia  6. Multilevel degenerative change  lumbar spine.           Continued follow-up of the chest suggested to document stability.  A repeat CT in 6-12 months could   be obtained or sooner as thought clinically indicated.        MICHAEL LONGORIA MD         Electronically Signed and Approved By: MICHAEL LONGORIA MD on 12/29/2022 at 12:31                      Previous LFT and renal function as well as CBC were normal from 12.9 2022.           Assessment and Plan    Diagnoses and all orders for this visit:    1. Gastroesophageal reflux disease without esophagitis (Primary)  -     AFB Culture - , Cough; Future  -     AFB Culture - , Cough; Future  -     AFB Culture - , Cough; Future  -     fluticasone-salmeterol (Advair HFA) 230-21 MCG/ACT inhaler; Inhale 2 puffs 2 (Two) Times a Day.  Dispense: 1 each; Refill: 11  -     fluticasone (FLONASE) 50 MCG/ACT nasal spray; 2 sprays into the nostril(s) as directed by provider Daily.  Dispense: 1 g; Refill: 11    2. Abnormal chest CT  -     AFB Culture - , Cough; Future  -     AFB Culture - , Cough; Future  -     AFB Culture - , Cough; Future  -     fluticasone-salmeterol (Advair HFA) 230-21 MCG/ACT inhaler; Inhale 2 puffs 2 (Two) Times a Day.  Dispense: 1 each; Refill: 11  -     fluticasone (FLONASE) 50 MCG/ACT nasal spray; 2 sprays into the nostril(s) as directed by provider Daily.  Dispense: 1 g; Refill: 11    3. Lung nodule  -     AFB Culture - , Cough; Future  -     AFB Culture - , Cough; Future  -     AFB Culture - , Cough; Future  -     fluticasone-salmeterol (Advair HFA) 230-21 MCG/ACT inhaler; Inhale 2 puffs 2 (Two) Times a Day.  Dispense: 1 each; Refill: 11  -     fluticasone (FLONASE) 50 MCG/ACT nasal spray; 2 sprays into the nostril(s) as directed by provider Daily.  Dispense: 1 g; Refill: 11    4. Mucopurulent chronic bronchitis (HCC)  -     AFB Culture - , Cough; Future  -     AFB Culture - , Cough; Future  -     AFB Culture - , Cough; Future  -     fluticasone-salmeterol (Advair HFA) 230-21 MCG/ACT inhaler;  Inhale 2 puffs 2 (Two) Times a Day.  Dispense: 1 each; Refill: 11  -     fluticasone (FLONASE) 50 MCG/ACT nasal spray; 2 sprays into the nostril(s) as directed by provider Daily.  Dispense: 1 g; Refill: 11    5. 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  Dispense: 1 each; Refill: 11  -     fluticasone (FLONASE) 50 MCG/ACT nasal spray; 2 sprays into the nostril(s) as directed by provider Daily.  Dispense: 1 g; Refill: 11    6. Chronic fatigue  -     AFB Culture - , Cough; Future  -     AFB Culture - , Cough; Future  -     AFB Culture - , Cough; Future  -     fluticasone-salmeterol (Advair HFA) 230-21 MCG/ACT inhaler; Inhale 2 puffs 2 (Two) Times a Day.  Dispense: 1 each; Refill: 11  -     fluticasone (FLONASE) 50 MCG/ACT nasal spray; 2 sprays into the nostril(s) as directed by provider Daily.  Dispense: 1 g; Refill: 11    7. Dizzy  -     fluticasone-salmeterol (Advair HFA) 230-21 MCG/ACT inhaler; Inhale 2 puffs 2 (Two) Times a Day.  Dispense: 1 each; Refill: 11  -     fluticasone (FLONASE) 50 MCG/ACT nasal spray; 2 sprays into the nostril(s) as directed by provider Daily.  Dispense: 1 g; Refill: 11    8. Fungal pneumonia  -     fluticasone-salmeterol (Advair HFA) 230-21 MCG/ACT inhaler; Inhale 2 puffs 2 (Two) Times a Day.  Dispense: 1 each; Refill: 11    9. Mycobacterium avium infection (HCC)  -     fluticasone-salmeterol (Advair HFA) 230-21 MCG/ACT inhaler; Inhale 2 puffs 2 (Two) Times a Day.  Dispense: 1 each; Refill: 11  -     fluticasone (FLONASE) 50 MCG/ACT nasal spray; 2 sprays into the nostril(s) as directed by provider Daily.  Dispense: 1 g; Refill: 11      Continue with rifampin 600 mg 3 times weekly, ethambutol 800 mg 3 times weekly, azithromycin 500 mg 3 times weekly.  Liver and  renal function are stable CBC count is normal.  CT scan shows improved lung nodules.    We will check sputum for AFB x3 over the next 3 months.  If patient is not able to produce any phlegm, may need to consider bronchoscopy.  He has been able to tolerate medications for now.    Continue Advair and start Incruse inhaler.  Use albuterol as needed.    Continue oxygen to keep saturations more than 90% with sleep and activities.  Continue Eliquis 5 mg twice daily.    Continue Flonase nasal spray.  Continue azelastine nasal spray.  We will try to wean him off Florinef.  Continue Remeron at 50 mg once nightly.    He is up-to-date on flu, pneumonia and COVID-vaccine including booster dose.  He is able to tolerate triple antibiotics for his MAC infection.    Follow Up   In 3-4 months.  Patient was given instructions and counseling regarding his condition or for health maintenance advice. Please see specific information pulled into the AVS if appropriate.       Electronically signed by Blu Delgado MD, 3/30/2023, 12:25 EDT.

## 2023-08-21 NOTE — PROGRESS NOTES
Primary Care Provider  Emanuel Crabtree MD   Referring Provider  No ref. provider found    Patient Complaint  Follow-up and COPD      SUBJECTIVE    History of Presenting Illness  Wesley Cunningham is a pleasant 89 y.o. male patient of Dr. Cain who presents to [facility] for 2  month followup appointment.  I saw the patient 6/30/2023. He is here for continued management of history of mycobacterium avium complex lung infection, COPD, history of MRSA pneumonia and lung nodules. He has stopped taking rifampin, ethambutol and azithromycin.  Patient states he has been on these medications for at least 2 years he says if not longer.  Patient states he is just tired of taking the medications.  Patient has not been able to produce a sputum specimen to check AFB and may need a bronchoscopy which was discussed at last visit.  He does not want to have a bronchoscopy at this time.  Patient presents today with a stomach upset.  States he has been nauseated this morning.  Denies fever and chills.  He is not aware of having any viruses or exposure to anyone with a virus.  He does have shortness of air with exertional activities but improves with rest.  He denies having any  coughing, wheezing, headaches, chest pain, weight loss or hemoptysis. Denies fevers, chills and night sweats. Wesley Cunningham is able to perform ADLs without difficulties and denies any swollen glands/lymph nodes in the head or neck.  He continues to be on albuterol inhaler, Advair, Combivent.    I have personally reviewed the review of systems, past family, social, medical and surgical histories; and agree with their findings.    Review of Systems  Constitutional symptoms:  Denied complaints   Ear, nose, throat: Denied complaints  Cardiovascular:  Denied complaints  Respiratory: Shortness of air with exertion  Gastrointestinal: Denied complaints  Musculoskeletal: Denied complaints    History  reviewed. No pertinent family history.     Social History     Socioeconomic History    Marital status:    Tobacco Use    Smoking status: Never     Passive exposure: Past    Smokeless tobacco: Never   Vaping Use    Vaping Use: Never used   Substance and Sexual Activity    Alcohol use: Never    Drug use: Never    Sexual activity: Defer        Past Medical History:   Diagnosis Date    Anxiety     Asthma, extrinsic     Chronic atrial fibrillation     Chronic bronchitis     COPD (chronic obstructive pulmonary disease)     Depression     Dizzy     Emphysema of lung     Erectile dysfunction     Hypertension     Pneumonia     Prostatitis         Immunization History   Administered Date(s) Administered    COVID-19 (MODERNA) 1st,2nd,3rd Dose Monovalent 02/09/2021, 02/09/2021, 03/11/2021, 03/11/2021, 05/09/2022    COVID-19 (MODERNA) BIVALENT 12+YRS 11/30/2022    COVID-19 (MODERNA) Monovalent Original Booster 10/28/2021    Fluzone High Dose =>65 Years ([employer] ONLY) 10/06/2020, 10/06/2020    Fluzone High-Dose 65+yrs 10/27/2022       Allergies   Allergen Reactions    Amoxicillin Anaphylaxis    Iodinated Contrast Media Anaphylaxis    Levofloxacin Anaphylaxis and Nausea And Vomiting    Morphine Unknown - High Severity and Unknown (See Comments)     Other reaction(s): Unknown - High Severity    Contrast Dye (Echo Or Unknown Ct/Mr) Rash and Swelling          Current Outpatient Medications:     ACIDOPHILUS LACTOBACILLUS PO, Take 1 capsule by mouth., Disp: , Rfl:     albuterol sulfate  (90 Base) MCG/ACT inhaler, Inhale 2 puffs Every 4 (Four) Hours As Needed for Wheezing., Disp: 18 g, Rfl: 5    Calcium Carbonate 1500 (600 Ca) MG tablet, Take  by mouth., Disp: , Rfl:     Cholecalciferol (Vitamin D3) 1.25 MG (87344 UT) capsule, , Disp: , Rfl:     Cholecalciferol 50 MCG (2000 UT) tablet, Take  by mouth., Disp: , Rfl:     clopidogrel (PLAVIX) 75 MG tablet, Take 1 tablet by mouth Daily., Disp: , Rfl:     coenzyme Q10 100 MG  capsule, Take  by mouth Daily., Disp: , Rfl:     Eliquis 5 MG tablet tablet, Take 1 tablet by mouth 2 (two) times a day., Disp: , Rfl:     ethambutol (MYAMBUTOL) 400 MG tablet, Take 2 tablets on Monday, Wednesday, and Friday, Disp: 24 tablet, Rfl: 4    fluticasone (FLONASE) 50 MCG/ACT nasal spray, 2 sprays into the nostril(s) as directed by provider Daily., Disp: 1 g, Rfl: 11    fluticasone-salmeterol (Advair HFA) 230-21 MCG/ACT inhaler, Inhale 2 puffs 2 (Two) Times a Day., Disp: 1 each, Rfl: 11    furosemide (LASIX) 20 MG tablet, furosemide 20 mg oral tablet take 1 tablet (20 mg) by oral route once daily   Active, Disp: , Rfl:     hydrocortisone-bacitracin-zinc oxide-nystatin (MAGIC BARRIER), Apply 1 application topically to the appropriate area as directed 3 (Three) Times a Day., Disp: 1 g, Rfl: 1    ipratropium-albuterol (COMBIVENT RESPIMAT)  MCG/ACT inhaler, Inhale 1 puff 4 (Four) Times a Day As Needed for Wheezing., Disp: 4 g, Rfl: 11    lisinopril (PRINIVIL,ZESTRIL) 5 MG tablet, Take 0.5 tablets by mouth Daily., Disp: , Rfl:     meclizine (ANTIVERT) 25 MG tablet, Take 1 tablet by mouth., Disp: , Rfl:     meloxicam (MOBIC) 15 MG tablet, meloxicam 15 mg oral tablet take 1 tablet (15 mg) by oral route once daily   Active, Disp: , Rfl:     metoprolol succinate XL (TOPROL-XL) 25 MG 24 hr tablet, metoprolol succinate 25 mg oral tablet extended release 24 hr take 1/2 tablet by oral route twice daily.   Active, Disp: , Rfl:     metoprolol tartrate (LOPRESSOR) 25 MG tablet, Take 0.5 tablets by mouth., Disp: , Rfl:     mirtazapine (REMERON) 15 MG tablet, Take 1 tablet by mouth Every Night., Disp: 30 tablet, Rfl: 6    multivitamin with minerals tablet tablet, Take 1 tablet by mouth Daily., Disp: , Rfl:     nystatin (MYCOSTATIN) 538517 UNIT/ML suspension, , Disp: , Rfl:     omeprazole (priLOSEC) 20 MG capsule, Take 1 capsule by mouth Daily., Disp: , Rfl:     ondansetron ODT (ZOFRAN-ODT) 8 MG disintegrating tablet, ,  "Disp: , Rfl:     pentoxifylline (TRENtal) 400 MG CR tablet, , Disp: , Rfl:     spironolactone (ALDACTONE) 25 MG tablet, Take 1 tablet by mouth Daily., Disp: , Rfl:     sulfamethoxazole-trimethoprim (BACTRIM,SEPTRA) 400-80 MG tablet, , Disp: , Rfl:     tamsulosin (FLOMAX) 0.4 MG capsule 24 hr capsule, Take 1 capsule by mouth 2 (two) times a day., Disp: , Rfl:     traMADol (ULTRAM) 50 MG tablet, , Disp: , Rfl:     venlafaxine XR (EFFEXOR-XR) 150 MG 24 hr capsule, Take 1 capsule by mouth Daily., Disp: , Rfl:     vitamin B-12 (CYANOCOBALAMIN) 1000 MCG tablet, Take 2.5 tablets by mouth Daily., Disp: , Rfl:     azithromycin (ZITHROMAX) 500 MG tablet, Take 1 tablet by mouth See Admin Instructions. TAKE 1 TABLET BY MOUTH ON : Monday, Wednesday, Friday. (Patient not taking: Reported on 8/25/2023), Disp: 12 tablet, Rfl: 5    busPIRone (BUSPAR) 5 MG tablet, Take 1 tablet by mouth 3 (Three) Times a Day., Disp: , Rfl:     clonazePAM (KlonoPIN) 0.5 MG tablet, , Disp: , Rfl:     dicyclomine (BENTYL) 20 MG tablet, Take 20 mg by mouth. (Patient not taking: Reported on 3/30/2023), Disp: , Rfl:     DULoxetine (CYMBALTA) 60 MG capsule, , Disp: , Rfl:     finasteride (PROSCAR) 5 MG tablet, , Disp: , Rfl:     ketoconazole (NIZORAL) 2 % cream, Apply  topically to the appropriate area as directed Daily. (Patient not taking: Reported on 6/30/2023), Disp: , Rfl:     rifAMPin (RIFADIN) 300 MG capsule, Take 2 tablets by mouth on monday wednesday and friday (Patient not taking: Reported on 8/25/2023), Disp: 24 capsule, Rfl: 4           Vital Signs   BP 97/60 (BP Location: Right arm, Patient Position: Sitting, Cuff Size: Adult)   Pulse 89   Temp 98 øF (36.7 øC) (Temporal)   Resp 16   Ht 172.7 cm (68\")   Wt 64 kg (141 lb 3.2 oz)   SpO2 90% Comment: ROOM AIR  BMI 21.47 kg/mý       OBJECTIVE    Physical Exam  Vital Signs Reviewed   WDWN, Alert, NAD.    HEENT:  PERRL, EOMI.  OP, nares clear  Neck:  Supple, no JVD, no thyromegaly  Chest:  good " aeration, clear to auscultation bilaterally, tympanic to percussion bilaterally, no work of breathing noted  CV: RRR, no MGR, pulses 2+, equal.  Abd:  Soft, NT, ND, + BS, no HSM  EXT:  no clubbing, no cyanosis, no edema  Neuro:  A&Ox3, CN grossly intact, no focal deficits.  Skin: No rashes or lesions noted    Results Review  I have personally reviewed the prior office notes, hospital records, labs, and diagnostics.    ASSESSMENT         Patient Active Problem List   Diagnosis    Abnormal chest CT    Abnormal positron emission tomography (PET) scan    Anxiety    Arthritis    Atrial flutter    Chronic fatigue    Chronic obstructive pulmonary disease    Dizzy    Essential hypertension    Fungal pneumonia    Gastroesophageal reflux disease without esophagitis    Pulmonary Mycobacterium avium complex (MAC) infection    Lung nodule    Toe infection       Encounter Diagnoses   Name Primary?    Pulmonary Mycobacterium avium complex (MAC) infection Yes    Chronic obstructive pulmonary disease, unspecified COPD type     Mucopurulent chronic bronchitis     Abnormal chest CT     Lung nodule     Dyspnea on exertion     Nausea       PLAN  I advised patient due to his nausea and fatigue to have him go to the emergency room to be evaluated and to possibly get IV fluids.  I offered the patient that I would call for an ambulance.  Patient declines and states he is going to go home and rest.  Patient is alone and did not have any family member local.  He lives in [place] and stated he can drive home to rest.  Offered patient oral hydration in office which he accepted.  Encouraged patient if his symptoms persist he needs to be evaluated that he should call 911.  Patient verbalizes understanding.    Discussed with patient stopping triple therapy for MAC infection at this time.  Discussed with patient need for sputum culture or possible bronchoscopy.  Patient declines any further treatment for MAC infection and states he cannot  produce the sputum culture as he does not cough anything up.  He wants to hold off on bronchoscopy at this time.  I recommended patient to follow back up in 3 to 6 months or sooner if needed.  Patient verbalizes understanding.      Diagnoses and all orders for this visit:    1. Pulmonary Mycobacterium avium complex (MAC) infection (Primary)    2. Chronic obstructive pulmonary disease, unspecified COPD type    3. Mucopurulent chronic bronchitis    4. Abnormal chest CT    5. Lung nodule    6. Dyspnea on exertion    7. Nausea           Smoking status:never  Vaccination status:up to date  Medications personally reviewed    Follow Up  Return in about 6 months (around 2/25/2024) for Dr. Delgado.    Patient was given instructions and counseling regarding his condition or for health maintenance advice. Please see specific information pulled into the AVS if appropriate.

## 2023-08-25 ENCOUNTER — OFFICE VISIT (OUTPATIENT)
Dept: PULMONOLOGY | Facility: CLINIC | Age: 88
End: 2023-08-25
Payer: MEDICARE

## 2023-08-25 VITALS
OXYGEN SATURATION: 90 % | BODY MASS INDEX: 21.4 KG/M2 | HEIGHT: 68 IN | WEIGHT: 141.2 LBS | TEMPERATURE: 98 F | DIASTOLIC BLOOD PRESSURE: 60 MMHG | SYSTOLIC BLOOD PRESSURE: 97 MMHG | RESPIRATION RATE: 16 BRPM | HEART RATE: 89 BPM

## 2023-08-25 DIAGNOSIS — J41.1 MUCOPURULENT CHRONIC BRONCHITIS: ICD-10-CM

## 2023-08-25 DIAGNOSIS — R06.09 DYSPNEA ON EXERTION: ICD-10-CM

## 2023-08-25 DIAGNOSIS — J44.9 CHRONIC OBSTRUCTIVE PULMONARY DISEASE, UNSPECIFIED COPD TYPE: ICD-10-CM

## 2023-08-25 DIAGNOSIS — R93.89 ABNORMAL CHEST CT: ICD-10-CM

## 2023-08-25 DIAGNOSIS — A31.0 PULMONARY MYCOBACTERIUM AVIUM COMPLEX (MAC) INFECTION: Primary | ICD-10-CM

## 2023-08-25 DIAGNOSIS — R91.1 LUNG NODULE: ICD-10-CM

## 2023-08-25 DIAGNOSIS — R11.0 NAUSEA: ICD-10-CM

## 2023-08-25 RX ORDER — SULFAMETHOXAZOLE AND TRIMETHOPRIM 400; 80 MG/1; MG/1
TABLET ORAL
COMMUNITY
Start: 2023-08-09

## 2025-03-04 ENCOUNTER — OFFICE VISIT (OUTPATIENT)
Dept: PULMONOLOGY | Facility: CLINIC | Age: OVER 89
End: 2025-03-04
Payer: MEDICARE

## 2025-03-04 VITALS
RESPIRATION RATE: 16 BRPM | TEMPERATURE: 97.6 F | BODY MASS INDEX: 21.37 KG/M2 | DIASTOLIC BLOOD PRESSURE: 53 MMHG | HEART RATE: 84 BPM | HEIGHT: 68 IN | SYSTOLIC BLOOD PRESSURE: 89 MMHG | WEIGHT: 141 LBS

## 2025-03-04 DIAGNOSIS — R06.09 DYSPNEA ON EXERTION: ICD-10-CM

## 2025-03-04 DIAGNOSIS — Z86.19 HISTORY OF MYCOBACTERIUM AVIUM COMPLEX INFECTION: ICD-10-CM

## 2025-03-04 DIAGNOSIS — R91.8 LUNG NODULES: Primary | ICD-10-CM

## 2025-03-04 DIAGNOSIS — J44.9 CHRONIC OBSTRUCTIVE PULMONARY DISEASE, UNSPECIFIED COPD TYPE: ICD-10-CM

## 2025-03-04 PROCEDURE — 99214 OFFICE O/P EST MOD 30 MIN: CPT | Performed by: NURSE PRACTITIONER

## 2025-03-04 PROCEDURE — 1160F RVW MEDS BY RX/DR IN RCRD: CPT | Performed by: NURSE PRACTITIONER

## 2025-03-04 PROCEDURE — 1159F MED LIST DOCD IN RCRD: CPT | Performed by: NURSE PRACTITIONER

## 2025-03-04 RX ORDER — ALBUTEROL SULFATE 90 UG/1
2 INHALANT RESPIRATORY (INHALATION) EVERY 4 HOURS PRN
Qty: 18 G | Refills: 5 | Status: SHIPPED | OUTPATIENT
Start: 2025-03-04

## 2025-03-04 RX ORDER — VITAMIN B COMPLEX
1 CAPSULE ORAL DAILY
COMMUNITY

## 2025-03-04 RX ORDER — FLUTICASONE FUROATE AND VILANTEROL TRIFENATATE 100; 25 UG/1; UG/1
1 POWDER RESPIRATORY (INHALATION) DAILY
COMMUNITY
Start: 2025-01-30 | End: 2025-03-04 | Stop reason: SDUPTHER

## 2025-03-04 RX ORDER — FLUTICASONE FUROATE AND VILANTEROL TRIFENATATE 100; 25 UG/1; UG/1
1 POWDER RESPIRATORY (INHALATION) DAILY
Qty: 1 EACH | Refills: 11 | Status: SHIPPED | OUTPATIENT
Start: 2025-03-04

## 2025-03-04 NOTE — PROGRESS NOTES
Primary Care Provider  Emanuel Crabtree MD   Referring Provider  No ref. provider found    Patient Complaint  Follow-up and Shortness of Breath (On exertion)    Patient or patient representative verbalized consent for the use of Ambient Listening during the visit with  RICHIE Levy for chart documentation. 3/4/2025  15:35 EST      Subjective       History of Presenting Illness  Wesley Cunningham is a pleasant 89 y.o. male  of  Dr. Delgado who presents to [facility] with history of mycobacterium avium complex lung infection, COPD, history of MRSA pneumonia, and lung nodules  here for follow-up appointment.  Patient was last seen 8/25/2023.  He was previously treated with rifampin, ethambutol and azithromycin for at least 2 years if not longer per patient.  Patient did not want to have a bronchoscopy at last visit as he was unable to produce a sputum specimen to check AFB.  Patient is here with his daughter today.  Patient previously had been under the care of Dr. Arceo, pulmonologist at [facility].    Patient previously had a CT of chest 2/6/2025 which showed 6 mm noncalcified nodule in the right upper lobe and 6 mm noncalcified nodule in the left lower lobe nodules with recommendation for follow-up CT in 6 months to document stability or resolution.  Bibasilar cylindrical bronchiectasis present.  History of Present Illness  The patient presents for evaluation of low oxygen saturation, lung nodules, and pruritus. He is accompanied by his daughter.    He reports no current chest pain but experiences mild back discomfort due to prolonged sitting. He has been under the care of a cardiologist who recently confirmed the absence of any cardiac issues. He has a history of myocardial infarction a few years ago, which has resulted in decreased energy levels and difficulty with ambulation. He does not have any recent peripheral edema and is  compliant with his diuretic medication. He has been experiencing increased mucus production and has had one episode of pneumonia since his last visit. He does not require supplemental oxygen at home. He has a history of irregular heartbeat post-myocardial infarction, but recent EKGs have shown normal rhythm. He has a nebulizer at home, which he finds beneficial, and he has used it 2 to 3 times recently. He has not previously tried Mucinex. He has a history of severe pulmonary infection, for which he was on medication for approximately 2.5 years. However, he discontinued the treatment due to gastrointestinal side effects. He was previously prescribed albuterol and Advair but has not been using these medications.    His O2 sat revealed an reading of 75-78% on room air, fingers very cold.  Finger former applied.  O2 at 2 L applied patient still 78% increased O2 to 4 L patient rebounded to 100% at 4 L.  Oxygen decreased down to 2 L and patient at 96%.  O2 removed patient stayed at 95% on room air for remainder of examination.    He feels he has a lot of mucus in the back of his throat and would like to have something to help expectorate the mucus.    He has been experiencing generalized pruritus, which has been resistant to treatment from three different physicians. His primary care physician attributed the itching to xerosis. The itching is particularly severe on his bilateral upper extremities, around his ears, neck, and back. He also reports a fine rash in these areas. In other areas, the itching starts as erythema and progresses to unhealed sores.    He has not yet scheduled a follow-up CT scan for his lung nodules.    Supplemental Information  He has recently established care with a new primary care physician who has ordered comprehensive blood work. He has a history of ocular injections, which have been causing him discomfort.    MEDICATIONS  Current: albuterol, Breo         At present time patient denies  coughing, wheezing, headaches, chest pain, weight loss or hemoptysis. Patient denies fevers, chills and night sweats. Wesley Cunningham is able to perform ADLs.      I have personally reviewed the review of systems, past family, social, medical and surgical histories; and agree with their findings.      Review of Systems   Constitutional: Negative.    HENT: Negative.     Respiratory:  Positive for shortness of breath.    Cardiovascular: Negative.    Musculoskeletal: Negative.    Neurological: Negative.    Psychiatric/Behavioral: Negative.           History reviewed. No pertinent family history.     Social History     Socioeconomic History    Marital status:    Tobacco Use    Smoking status: Never     Passive exposure: Past    Smokeless tobacco: Never   Vaping Use    Vaping status: Never Used   Substance and Sexual Activity    Alcohol use: Never    Drug use: Never    Sexual activity: Defer        Past Medical History:   Diagnosis Date    Anxiety     Asthma, extrinsic     Chronic atrial fibrillation     Chronic bronchitis     COPD (chronic obstructive pulmonary disease)     Depression     Dizzy     Emphysema of lung     Erectile dysfunction     Hypertension     Pneumonia     Prostatitis         Immunization History   Administered Date(s) Administered    COVID-19 (MODERNA) 1st,2nd,3rd Dose Monovalent 02/09/2021, 02/09/2021, 03/11/2021, 03/11/2021, 05/09/2022    COVID-19 (MODERNA) BIVALENT 12+YRS 11/30/2022    COVID-19 (MODERNA) Monovalent Original Booster 10/28/2021    COVID-19 (PFIZER) 12YRS+ (COMIRNATY) 11/08/2023, 10/16/2024    Fluzone High-Dose 65+YRS 10/06/2020, 10/06/2020    Fluzone High-Dose 65+yrs 10/27/2022, 10/16/2024    RSV, unspecified (Vaccine or MAB) 10/30/2024       Allergies   Allergen Reactions    Amoxicillin Anaphylaxis    Iodinated Contrast Media Anaphylaxis    Levofloxacin Anaphylaxis and Nausea And Vomiting    Morphine Unknown - High Severity and Unknown (See Comments)     Other  reaction(s): Unknown - High Severity    Contrast Dye (Echo Or Unknown Ct/Mr) Rash and Swelling          Current Outpatient Medications:     ACIDOPHILUS LACTOBACILLUS PO, Take 1 capsule by mouth., Disp: , Rfl:     albuterol sulfate  (90 Base) MCG/ACT inhaler, Inhale 2 puffs Every 4 (Four) Hours As Needed for Wheezing., Disp: 18 g, Rfl: 5    b complex vitamins capsule, Take 1 capsule by mouth Daily., Disp: , Rfl:     Breo Ellipta 100-25 MCG/ACT aerosol powder , Inhale 1 puff Daily., Disp: 1 each, Rfl: 11    busPIRone (BUSPAR) 5 MG tablet, Take 1 tablet by mouth 3 (Three) Times a Day., Disp: , Rfl:     Calcium Carbonate 1500 (600 Ca) MG tablet, Take  by mouth., Disp: , Rfl:     Cholecalciferol (Vitamin D3) 1.25 MG (75964 UT) capsule, , Disp: , Rfl:     Cholecalciferol 50 MCG (2000 UT) tablet, Take  by mouth., Disp: , Rfl:     clopidogrel (PLAVIX) 75 MG tablet, Take 1 tablet by mouth Daily., Disp: , Rfl:     coenzyme Q10 100 MG capsule, Take  by mouth Daily., Disp: , Rfl:     Eliquis 5 MG tablet tablet, Take 1 tablet by mouth 2 (two) times a day., Disp: , Rfl:     finasteride (PROSCAR) 5 MG tablet, , Disp: , Rfl:     fluticasone (FLONASE) 50 MCG/ACT nasal spray, 2 sprays into the nostril(s) as directed by provider Daily., Disp: 1 g, Rfl: 11    furosemide (LASIX) 20 MG tablet, furosemide 20 mg oral tablet take 1 tablet (20 mg) by oral route once daily   Active, Disp: , Rfl:     hydrocortisone-bacitracin-zinc oxide-nystatin (MAGIC BARRIER), Apply 1 application topically to the appropriate area as directed 3 (Three) Times a Day., Disp: 1 g, Rfl: 1    lisinopril (PRINIVIL,ZESTRIL) 5 MG tablet, Take 0.5 tablets by mouth Daily., Disp: , Rfl:     meclizine (ANTIVERT) 25 MG tablet, Take 1 tablet by mouth., Disp: , Rfl:     meloxicam (MOBIC) 15 MG tablet, meloxicam 15 mg oral tablet take 1 tablet (15 mg) by oral route once daily   Active, Disp: , Rfl:     metoprolol succinate XL (TOPROL-XL) 25 MG 24 hr tablet, metoprolol  succinate 25 mg oral tablet extended release 24 hr take 1/2 tablet by oral route twice daily.   Active, Disp: , Rfl:     metoprolol tartrate (LOPRESSOR) 25 MG tablet, Take 0.5 tablets by mouth., Disp: , Rfl:     mirtazapine (REMERON) 15 MG tablet, Take 1 tablet by mouth Every Night., Disp: 30 tablet, Rfl: 6    multivitamin with minerals tablet tablet, Take 1 tablet by mouth Daily., Disp: , Rfl:     omeprazole (priLOSEC) 20 MG capsule, Take 1 capsule by mouth Daily., Disp: , Rfl:     spironolactone (ALDACTONE) 25 MG tablet, Take 1 tablet by mouth Daily., Disp: , Rfl:     tamsulosin (FLOMAX) 0.4 MG capsule 24 hr capsule, Take 1 capsule by mouth 2 (two) times a day., Disp: , Rfl:     traMADol (ULTRAM) 50 MG tablet, , Disp: , Rfl:     clonazePAM (KlonoPIN) 0.5 MG tablet, , Disp: , Rfl:     dicyclomine (BENTYL) 20 MG tablet, Take 20 mg by mouth. (Patient not taking: Reported on 3/4/2025), Disp: , Rfl:     DULoxetine (CYMBALTA) 60 MG capsule, , Disp: , Rfl:     ethambutol (MYAMBUTOL) 400 MG tablet, Take 2 tablets on Monday, Wednesday, and Friday (Patient not taking: Reported on 3/4/2025), Disp: 24 tablet, Rfl: 4    ketoconazole (NIZORAL) 2 % cream, Apply  topically to the appropriate area as directed Daily. (Patient not taking: Reported on 3/4/2025), Disp: , Rfl:     nystatin (MYCOSTATIN) 679350 UNIT/ML suspension, , Disp: , Rfl:     ondansetron ODT (ZOFRAN-ODT) 8 MG disintegrating tablet, , Disp: , Rfl:     pentoxifylline (TRENtal) 400 MG CR tablet, , Disp: , Rfl:     rifAMPin (RIFADIN) 300 MG capsule, Take 2 tablets by mouth on monday wednesday and friday (Patient not taking: Reported on 3/4/2025), Disp: 24 capsule, Rfl: 4    venlafaxine XR (EFFEXOR-XR) 150 MG 24 hr capsule, Take 1 capsule by mouth Daily. (Patient not taking: Reported on 3/4/2025), Disp: , Rfl:     vitamin B-12 (CYANOCOBALAMIN) 1000 MCG tablet, Take 2.5 tablets by mouth Daily., Disp: , Rfl:          Vital Signs   BP (!) 89/53 (BP Location: Left arm,  "Patient Position: Sitting, Cuff Size: Adult)   Pulse 84   Temp 97.6 °F (36.4 °C)   Resp 16   Ht 172.7 cm (68\")   Wt 64 kg (141 lb)   BMI 21.44 kg/m²       Objective     Physical Exam  Vitals reviewed.   Constitutional:       General: He is not in acute distress.     Appearance: Normal appearance. He is not ill-appearing.   HENT:      Head: Normocephalic and atraumatic.      Nose: Nose normal.      Mouth/Throat:      Mouth: Mucous membranes are moist.      Pharynx: Oropharynx is clear.   Eyes:      Extraocular Movements: Extraocular movements intact.      Conjunctiva/sclera: Conjunctivae normal.      Pupils: Pupils are equal, round, and reactive to light.   Cardiovascular:      Rate and Rhythm: Normal rate and regular rhythm.      Pulses: Normal pulses.      Heart sounds: Normal heart sounds.   Pulmonary:      Effort: Pulmonary effort is normal. No respiratory distress.      Breath sounds: Normal breath sounds. No stridor. No wheezing, rhonchi or rales.   Abdominal:      General: Bowel sounds are normal.   Musculoskeletal:         General: Normal range of motion.      Cervical back: Normal range of motion and neck supple.   Skin:     General: Skin is warm and dry.   Neurological:      Mental Status: He is alert and oriented to person, place, and time.   Psychiatric:         Behavior: Behavior normal.         Physical Exam  Lungs are clear.    Vital Signs  His O2 sat revealed an reading of 75-78% on room air, fingers very cold.  Finger former applied.  O2 at 2 L applied patient still 78% increased O2 to 4 L patient rebounded to 100% at 4 L.  Oxygen decreased down to 2 L and patient at 96%.  O2 removed patient stayed at 95% on room air for remainder of examination.         Results Review  I have personally reviewed the prior office notes, hospital records, labs, and diagnostics.  CT Chest Without Contrast Diagnostic  Order: [id number]  Impression          Small right upper lobe and left lower lobe nodules and " follow-up CT is recommended in 6 months document stability or resolution.        [id number]  Narrative    INDICATION: R91.1: Solitary pulmonary nodule    EXAMINATION: CT CHEST WITHOUT CONTRAST - CT CHEST WO CONTRAST    TECHNIQUE:  Helically acquired images were obtained of the chest. A radiation dose optimization technique was used for this scan.  IV Contrast dosage and agent: None.    COMPARISON: 2/27/2024  ____________________________________________    FINDINGS:      LUNGS, PLEURA AND LARGE AIRWAYS: Mild bilateral apical scarring some of which is calcified. Mild emphysema. There is no change in the 6 mm noncalcified nodule in the right upper lobe the lungs on image 17 and follow-up CT is recommended in 6 months  document stability per ACR guidelines. No change in small nodules and scarring in the posterior left upper lobe. Another 6 cm noncalcified nodule left lower lobe lungs on image 44 and follow-up CT is recommended in 6 months. Bibasilar cylindrical  bronchiectasis. No pleural effusion or thickening.  No pneumothorax.    THYROID: No thyroid lesions.    HEART AND PERICARDIUM: Heart size is normal.  No pericardial effusion.    CORONARY ARTERIES: Coronary artery calcification is seen.    VESSELS: Thoracic aorta is not dilated.    MEDIASTINUM AND MARIELA: No mediastinal or hilar adenopathy.  Esophagus is unremarkable.  No hiatal hernia.    UPPER ABDOMEN: Status post cholecystectomy..    BONES: Mild levoscoliosis of the thoracic lumbar spine with degenerative disc disease.  Exam End: 02/06/25 14:26    Specimen Collected: 02/06/25 15:30 Last Resulted: 02/06/25 15:34   Received From: [facility]  Result Received: 03/04/25 15:33       Results  Imaging  CT scan shows a couple of small nodules in the lungs, each 6 mm in size.          Assessment         Patient Active Problem List   Diagnosis    Abnormal chest CT    Abnormal positron emission tomography (PET) scan    Anxiety    Arthritis    Atrial flutter     Chronic fatigue    Chronic obstructive pulmonary disease    Dizzy    Essential hypertension    Fungal pneumonia    Gastroesophageal reflux disease without esophagitis    Pulmonary Mycobacterium avium complex (MAC) infection    Lung nodule    Toe infection        Plan     Diagnoses and all orders for this visit:    1. Lung nodules (Primary)  -     CT Chest Without Contrast; Future  -     albuterol sulfate  (90 Base) MCG/ACT inhaler; Inhale 2 puffs Every 4 (Four) Hours As Needed for Wheezing.  Dispense: 18 g; Refill: 5  -     Breo Ellipta 100-25 MCG/ACT aerosol powder ; Inhale 1 puff Daily.  Dispense: 1 each; Refill: 11    2. Dyspnea on exertion  -     albuterol sulfate  (90 Base) MCG/ACT inhaler; Inhale 2 puffs Every 4 (Four) Hours As Needed for Wheezing.  Dispense: 18 g; Refill: 5  -     Breo Ellipta 100-25 MCG/ACT aerosol powder ; Inhale 1 puff Daily.  Dispense: 1 each; Refill: 11    3. Chronic obstructive pulmonary disease, unspecified COPD type  -     albuterol sulfate  (90 Base) MCG/ACT inhaler; Inhale 2 puffs Every 4 (Four) Hours As Needed for Wheezing.  Dispense: 18 g; Refill: 5  -     Breo Ellipta 100-25 MCG/ACT aerosol powder ; Inhale 1 puff Daily.  Dispense: 1 each; Refill: 11    4. History of Mycobacterium avium complex infection  -     albuterol sulfate  (90 Base) MCG/ACT inhaler; Inhale 2 puffs Every 4 (Four) Hours As Needed for Wheezing.  Dispense: 18 g; Refill: 5  -     Breo Ellipta 100-25 MCG/ACT aerosol powder ; Inhale 1 puff Daily.  Dispense: 1 each; Refill: 11         Assessment & Plan  1. Low oxygen saturation.  His oxygen saturation levels are concerningly low, registering at 78 percent, which necessitates an emergency room visit. The possibility of a mucus plug contributing to his dyspnea was discussed, but a recent CT scan did not reveal this. His oxygen saturation levels have been fluctuating between 75 and 78 percent, even with supplemental oxygen. However, his  oxygen saturation level has now improved to 99 percent. He was advised to use his nebulizer tonight if necessary. He was instructed to seek immediate medical attention at the hospital if his condition worsens. He was also advised to monitor his oxygen saturation levels at home and ensure they remain above 90 percent. A prescription for albuterol inhaler was sent to the pharmacy. A refill for Breo was provided, with a note to the pharmacy to substitute it per insurance formulary if necessary. Samples of Mucinex were provided.    2. Lung nodules.  A recent CT scan revealed the presence of two small nodules, each measuring 6 mm. A follow-up CT scan has been ordered for 6 months from now to monitor the nodules.    3. Pruritus.  The pruritus could potentially be a manifestation of eczema. He was advised to apply emollients such as Vaseline or Eucerin to the affected areas.    Follow-up  The patient will follow up in 6 months, or earlier if necessary.    PROCEDURE  The patient has a history of ocular injections, which have been causing him discomfort.      Smoking status:  reports that he has never smoked. He has been exposed to tobacco smoke. He has never used smokeless tobacco.    Vaccination status: Reviewed  Immunization History   Administered Date(s) Administered    COVID-19 (MODERNA) 1st,2nd,3rd Dose Monovalent 02/09/2021, 02/09/2021, 03/11/2021, 03/11/2021, 05/09/2022    COVID-19 (MODERNA) BIVALENT 12+YRS 11/30/2022    COVID-19 (MODERNA) Monovalent Original Booster 10/28/2021    COVID-19 (PFIZER) 12YRS+ (COMIRNATY) 11/08/2023, 10/16/2024    Fluzone High-Dose 65+YRS 10/06/2020, 10/06/2020    Fluzone High-Dose 65+yrs 10/27/2022, 10/16/2024    RSV, unspecified (Vaccine or MAB) 10/30/2024        Medications personally reviewed    Follow Up  Return in about 6 months (around 9/4/2025) for after CT scan Dr. Delgado.    Patient was given instructions and counseling regarding his condition or for health maintenance advice.  Please see specific information pulled into the AVS if appropriate.     I spent 22 minutes caring for Wesley Cunningham on this date of service. This time includes time spent by me in the following activities:preparing for the visit, reviewing tests, obtaining and/or reviewing a separately obtained history, performing a medically appropriate examination and/or evaluation, counseling and educating the patient/family/caregiver, ordering medications, tests, or procedures, documenting information in the medical record, independently interpreting results and communicating that information with the patient/family/caregiver and answered questions family members, discuss medications.